# Patient Record
Sex: MALE | Race: BLACK OR AFRICAN AMERICAN
[De-identification: names, ages, dates, MRNs, and addresses within clinical notes are randomized per-mention and may not be internally consistent; named-entity substitution may affect disease eponyms.]

---

## 2019-07-29 ENCOUNTER — HOSPITAL ENCOUNTER (OUTPATIENT)
Dept: HOSPITAL 97 - ER | Age: 84
Setting detail: OBSERVATION
LOS: 3 days | Discharge: HOME | End: 2019-08-01
Attending: FAMILY MEDICINE | Admitting: FAMILY MEDICINE
Payer: COMMERCIAL

## 2019-07-29 VITALS — BODY MASS INDEX: 33.1 KG/M2

## 2019-07-29 DIAGNOSIS — E11.9: ICD-10-CM

## 2019-07-29 DIAGNOSIS — R41.82: ICD-10-CM

## 2019-07-29 DIAGNOSIS — R50.9: Primary | ICD-10-CM

## 2019-07-29 DIAGNOSIS — I65.21: ICD-10-CM

## 2019-07-29 DIAGNOSIS — R53.81: ICD-10-CM

## 2019-07-29 LAB
ALBUMIN SERPL BCP-MCNC: 3.7 G/DL (ref 3.4–5)
ALP SERPL-CCNC: 93 U/L (ref 45–117)
ALT SERPL W P-5'-P-CCNC: 16 U/L (ref 12–78)
AST SERPL W P-5'-P-CCNC: 15 U/L (ref 15–37)
BUN BLD-MCNC: 22 MG/DL (ref 7–18)
GLUCOSE SERPLBLD-MCNC: 96 MG/DL (ref 74–106)
HCT VFR BLD CALC: 38.6 % (ref 39.6–49)
INR BLD: 1.12
LIPASE SERPL-CCNC: 111 U/L (ref 73–393)
LYMPHOCYTES # SPEC AUTO: 3.4 K/UL (ref 0.7–4.9)
MAGNESIUM SERPL-MCNC: 2 MG/DL (ref 1.8–2.4)
NT-PROBNP SERPL-MCNC: 635 PG/ML (ref ?–450)
PMV BLD: 8.2 FL (ref 7.6–11.3)
POTASSIUM SERPL-SCNC: 4.1 MMOL/L (ref 3.5–5.1)
RBC # BLD: 4.27 M/UL (ref 4.33–5.43)
TROPONIN (EMERG DEPT USE ONLY): < 0.02 NG/ML (ref 0–0.04)

## 2019-07-29 PROCEDURE — 83880 ASSAY OF NATRIURETIC PEPTIDE: CPT

## 2019-07-29 PROCEDURE — 96365 THER/PROPH/DIAG IV INF INIT: CPT

## 2019-07-29 PROCEDURE — 87186 SC STD MICRODIL/AGAR DIL: CPT

## 2019-07-29 PROCEDURE — 97112 NEUROMUSCULAR REEDUCATION: CPT

## 2019-07-29 PROCEDURE — 80076 HEPATIC FUNCTION PANEL: CPT

## 2019-07-29 PROCEDURE — 97116 GAIT TRAINING THERAPY: CPT

## 2019-07-29 PROCEDURE — 87040 BLOOD CULTURE FOR BACTERIA: CPT

## 2019-07-29 PROCEDURE — 99285 EMERGENCY DEPT VISIT HI MDM: CPT

## 2019-07-29 PROCEDURE — 81015 MICROSCOPIC EXAM OF URINE: CPT

## 2019-07-29 PROCEDURE — 87077 CULTURE AEROBIC IDENTIFY: CPT

## 2019-07-29 PROCEDURE — 94760 N-INVAS EAR/PLS OXIMETRY 1: CPT

## 2019-07-29 PROCEDURE — 36415 COLL VENOUS BLD VENIPUNCTURE: CPT

## 2019-07-29 PROCEDURE — 87086 URINE CULTURE/COLONY COUNT: CPT

## 2019-07-29 PROCEDURE — 93880 EXTRACRANIAL BILAT STUDY: CPT

## 2019-07-29 PROCEDURE — 51702 INSERT TEMP BLADDER CATH: CPT

## 2019-07-29 PROCEDURE — 83605 ASSAY OF LACTIC ACID: CPT

## 2019-07-29 PROCEDURE — 82962 GLUCOSE BLOOD TEST: CPT

## 2019-07-29 PROCEDURE — 93005 ELECTROCARDIOGRAM TRACING: CPT

## 2019-07-29 PROCEDURE — 81003 URINALYSIS AUTO W/O SCOPE: CPT

## 2019-07-29 PROCEDURE — 84484 ASSAY OF TROPONIN QUANT: CPT

## 2019-07-29 PROCEDURE — 83735 ASSAY OF MAGNESIUM: CPT

## 2019-07-29 PROCEDURE — 97530 THERAPEUTIC ACTIVITIES: CPT

## 2019-07-29 PROCEDURE — 97161 PT EVAL LOW COMPLEX 20 MIN: CPT

## 2019-07-29 PROCEDURE — 85025 COMPLETE CBC W/AUTO DIFF WBC: CPT

## 2019-07-29 PROCEDURE — 94640 AIRWAY INHALATION TREATMENT: CPT

## 2019-07-29 PROCEDURE — 84145 PROCALCITONIN (PCT): CPT

## 2019-07-29 PROCEDURE — 80048 BASIC METABOLIC PNL TOTAL CA: CPT

## 2019-07-29 PROCEDURE — 83690 ASSAY OF LIPASE: CPT

## 2019-07-29 PROCEDURE — 87804 INFLUENZA ASSAY W/OPTIC: CPT

## 2019-07-29 PROCEDURE — 71045 X-RAY EXAM CHEST 1 VIEW: CPT

## 2019-07-29 PROCEDURE — 96361 HYDRATE IV INFUSION ADD-ON: CPT

## 2019-07-29 PROCEDURE — 87088 URINE BACTERIA CULTURE: CPT

## 2019-07-29 PROCEDURE — 85610 PROTHROMBIN TIME: CPT

## 2019-07-29 RX ADMIN — FAMOTIDINE SCH MG: 10 INJECTION, SOLUTION INTRAVENOUS at 22:22

## 2019-07-29 RX ADMIN — SODIUM CHLORIDE SCH: 0.9 INJECTION, SOLUTION INTRAVENOUS at 21:29

## 2019-07-29 RX ADMIN — Medication SCH ML: at 22:23

## 2019-07-29 NOTE — RAD REPORT
EXAM DESCRIPTION:  Anastasia Single View7/29/2019 5:55 pm

 

CLINICAL HISTORY:  Cough

 

COMPARISON:  July 2018

 

FINDINGS:   The lungs appear clear of acute infiltrate. The heart is mildly enlarged

 

IMPRESSION:   No acute abnormalities displayed

## 2019-07-29 NOTE — EDPHYS
Physician Documentation                                                                           

 [facility]                                                                 

Name: Rishi Joseph                                                                                

Age: 85 yrs                                                                                       

Sex: Male                                                                                         

: 1933                                                                                   

MRN: [id number]                                                                                   

Arrival Date: 2019                                                                          

Time: 16:55                                                                                       

Account#: [id number]                                                                            

Bed 5                                                                                             

Private MD: Abdullahi Rodrigues ED Physician Bridger Barber                                                                      

HPI:                                                                                              

                                                                                             

17:17 This 85 yrs old Black Male presents to ER via Unassigned with complaints of General     gabino 

      Weakness.                                                                                   

17:17 fever and cough. The patient or guardian reports cough. Onset: The symptoms/episode     gabino 

      began/occurred 1 day(s) ago. Severity of symptoms: At their worst the symptoms were         

      mild, in the emergency department the symptoms are unchanged. Modifying factors: The        

      symptoms are alleviated by cool environment, the symptoms are aggravated by exertion.       

      The patient reports fever, that was measured at 102 degrees Fahrenheit. Modifying           

      factors: there are no obvious modifying factors.                                            

17:19 Associated signs and symptoms: Pertinent positives: fever.                              gabino 

                                                                                                  

Historical:                                                                                       

- Allergies:                                                                                      

17:25 No Known Allergies;                                                                     iw  

- Home Meds:                                                                                      

17:19 amlodipine 10 mg tab 1 tab once daily [Active]; aspirin 81 mg Oral TbEC 1 tab once      iw  

      daily [Active]; chlorthalidone 25 mg Oral tab 1 tab once daily [Active]; Coreg 12.5 mg      

      Oral tab 1 tab 2 times per day [Active]; glipizide 10 mg Oral tab 1 tab once daily          

      [Active]; ranitidine HCl 150 mg Oral cap 1 cap once daily [Active]; tamsulosin 0.4 mg       

      Oral cp24 1 cap twice a day [Active];                                                       

- PMHx:                                                                                           

17:19 BPH; Diabetes - NIDDM; GERD; High Cholesterol; Hypertension;                            iw  

- PSHx:                                                                                           

17:19 Knee surgery;                                                                           iw  

                                                                                                  

- Immunization history:: Adult Immunizations up to date.                                          

- Family history:: not pertinent.                                                                 

- Ebola Screening: : Patient negative for fever greater than or equal to 101.5 degrees            

  Fahrenheit, and additional compatible Ebola Virus Disease symptoms Patient denies               

  exposure to infectious person Patient denies travel to an Ebola-affected area in the            

  21 days before illness onset No symptoms or risks identified at this time.                      

- Social history:: Smoking status: Patient/guardian denies using tobacco.                         

                                                                                                  

                                                                                                  

ROS:                                                                                              

17:17 Eyes: Negative for injury, pain, redness, and discharge, ENT: Negative for injury,      gabino 

      pain, and discharge, Neck: Negative for injury, pain, and swelling, Cardiovascular:         

      Negative for chest pain, palpitations, and edema, Abdomen/GI: Negative for abdominal        

      pain, nausea, vomiting, diarrhea, and constipation, Back: Negative for injury and pain,     

      : Negative for injury, bleeding, discharge, and swelling, MS/Extremity: Negative for      

      injury and deformity, Skin: Negative for injury, rash, and discoloration, Neuro:            

      Negative for headache, weakness, numbness, tingling, and seizure, Psych: Negative for       

      depression, anxiety, suicide ideation, homicidal ideation, and hallucinations,              

      Allergy/Immunology: Negative for hives, rash, and allergies, Endocrine: Negative for        

      neck swelling, polydipsia, polyuria, polyphagia, and marked weight changes,                 

      Hematologic/Lymphatic: Negative for swollen nodes, abnormal bleeding, and unusual           

      bruising.                                                                                   

17:17 Constitutional: Positive for body aches, chills, fatigue, fever, malaise.                   

17:17 Respiratory: Positive for cough, with no reported sputum.                                   

                                                                                                  

Exam:                                                                                             

17:17 Head/Face:  Normocephalic, atraumatic. Eyes:  Pupils equal round and reactive to light, gabino 

      extra-ocular motions intact.  Lids and lashes normal.  Conjunctiva and sclera are           

      non-icteric and not injected.  Cornea within normal limits.  Periorbital areas with no      

      swelling, redness, or edema. ENT:  Nares patent. No nasal discharge, no septal              

      abnormalities noted.  Tympanic membranes are normal and external auditory canals are        

      clear.  Oropharynx with no redness, swelling, or masses, exudates, or evidence of           

      obstruction, uvula midline.  Mucous membranes moist. Neck:  Trachea midline, no             

      thyromegaly or masses palpated, and no cervical lymphadenopathy.  Supple, full range of     

      motion without nuchal rigidity, or vertebral point tenderness.  No Meningismus.             

      Chest/axilla:  Normal chest wall appearance and motion.  Nontender with no deformity.       

      No lesions are appreciated. Cardiovascular:  Regular rate and rhythm with a normal S1       

      and S2.  No gallops, murmurs, or rubs.  Normal PMI, no JVD.  No pulse deficits.             

      Respiratory:  Lungs have equal breath sounds bilaterally, clear to auscultation and         

      percussion.  No rales, rhonchi or wheezes noted.  No increased work of breathing, no        

      retractions or nasal flaring. Abdomen/GI:  Soft, non-tender, with normal bowel sounds.      

      No distension or tympany.  No guarding or rebound.  No evidence of tenderness               

      throughout. Back:  No spinal tenderness.  No costovertebral tenderness.  Full range of      

      motion. Skin:  Warm, dry with normal turgor.  Normal color with no rashes, no lesions,      

      and no evidence of cellulitis. MS/ Extremity:  Pulses equal, no cyanosis.                   

      Neurovascular intact.  Full, normal range of motion. Neuro:  Awake and alert, GCS 15,       

      oriented to person, place, time, and situation.  Cranial nerves II-XII grossly intact.      

      Motor strength 5/5 in all extremities.  Sensory grossly intact.  Cerebellar exam            

      normal.  Normal gait. Psych:  Awake, alert, with orientation to person, place and time.     

       Behavior, mood, and affect are within normal limits.                                       

17:17 Constitutional: The patient appears febrile, lethargic.                                     

                                                                                                  

Vital Signs:                                                                                      

17:10  / 59 LA Sitting (auto/lg); Pulse 82; Resp 20; Temp 102.8(O); Pulse Ox 99% ;      jp3 

      Weight 106.59 kg (R); Height 5 ft. 10 in. (177.80 cm) (R); Pain 0/10;                       

18:18  / 62; Pulse 70; Resp 22; Pulse Ox 98% ;                                          bp  

18:51  / 87; Pulse 78; Resp 16; Pulse Ox 98% ;                                          bp  

19:55  / 52; Pulse 74; Resp 16; Temp 98.6(O); Pulse Ox 100% on R/A;                     tr5 

20:56  / 63; Pulse 64; Resp 19; Pulse Ox 99% on R/A;                                    tr5 

17:10 Body Mass Index 33.72 (106.59 kg, 177.80 cm)                                            jp3 

17:10 patient complains of weakness                                                           3 

                                                                                                  

MDM:                                                                                              

16:57 Patient medically screened.                                                             [facility] 

17:17 Data reviewed: vital signs, nurses notes, lab test result(s), EKG, radiologic studies,  [facility] 

      plain films.                                                                                

                                                                                                  

                                                                                             

17:16 Order name: Basic Metabolic Panel; Complete Time: 19:01                                 [facility] 

                                                                                             

17:16 Order name: CBC with Diff; Complete Time: 18:29                                         [facility] 

                                                                                             

17:16 Order name: LFT's; Complete Time: 19:01                                                 [facility] 

                                                                                             

17:16 Order name: Magnesium; Complete Time: 19:01                                             [facility] 

                                                                                             

17:16 Order name: NT PRO-BNP; Complete Time: 19:01                                            [facility] 

                                                                                             

17:16 Order name: PT-INR; Complete Time: 19:                                                [facility] 

                                                                                             

17:16 Order name: Troponin (emerg Dept Use Only); Complete Time: 19:01                        [facility] 

                                                                                             

17:16 Order name: Blood Culture Adult (2)                                                     [facility] 

                                                                                             

17:16 Order name: Urine Culture                                                               [facility] 

                                                                                             

17:16 Order name: Lipase; Complete Time: 19:01                                                [facility] 

                                                                                             

17:16 Order name: Influenza Screen (a \T\ B); Complete Time: 19:                              [facility]

                                                                                             

17:16 Order name: Lactate; Complete Time: 19:                                               [facility] 

                                                                                             

17:16 Order name: Procalcitonin                                                               [facility] 

                                                                                             

19:43 Order name: Urine Dipstick--Ancillary (enter results)                                   mw2 

                                                                                             

17:16 Order name: XRAY Chest (1 view)                                                         [facility] 

                                                                                             

17:16 Order name: EKG; Complete Time: 17:20                                                   [facility] 

                                                                                             

17:16 Order name: Cardiac monitoring; Complete Time: 18:14                                    [facility] 

                                                                                             

17:16 Order name: EKG - Nurse/Tech; Complete Time: 18:14                                      [facility] 

                                                                                             

17:16 Order name: IV Saline Lock; Complete Time: 18:14                                        [facility] 

                                                                                             

17:16 Order name: Labs collected and sent; Complete Time: 18:14                               [facility] 

                                                                                             

17:16 Order name: O2 Per Protocol; Complete Time: 18:14                                       [facility] 

                                                                                             

17:16 Order name: O2 Sat Monitoring; Complete Time: 18:14                                     [facility] 

                                                                                             

18:12 Order name: RAD; Complete Time: 18:29                                                   [facility]

                                                                                             

20:14 Order name: Urine Dipstick-Ancillary                                                    [facility]

                                                                                             

17:16 Order name: Urine Dipstick-Ancillary (obtain specimen); Complete Time: 19:56            [facility] 

                                                                                                  

Administered Medications:                                                                         

18:10 Drug: NS 0.9% 1000 ml Route: IV; Rate: 1 bolus; Site: right forearm;                    bp  

20:29 Follow up: IV Status: Completed infusion; IV Intake: 1000ml                             tr5 

18:10 Drug: NS 0.9% 1000 ml Route: IV; Rate: 125 ml/hr; Site: right forearm;                  bp  

19:52 Follow up: IV Status: Infusion continued upon admission                                 lp1 

20:29 Follow up: IV Status: Infusion continued upon admission                                 tr5 

18:49 Drug: Tylenol 650 mg Route: PO;                                                         bp  

19:52 Follow up: Response: Temperature is decreased                                           lp1 

18:50 Drug: NS 0.9% 1000 ml Route: IV; Rate: 1 bolus; Site: right forearm;                    bp  

20:45 Follow up: IV Status: Completed infusion; IV Intake: 800ml                              lp1 

19:45 Drug: Rocephin 2 grams Route: IV; Rate: per protocol; Site: right hand;                 lp1 

20:30 Follow up: Response: No adverse reaction; IV Status: Completed infusion                 tr5 

19:53 Drug: Zithromax 500 mg Route: IVPB; Infused Over: 1 hrs; Site: right hand;              lp1 

20:30 Follow up: Response: No adverse reaction; IV Status: Completed infusion; IV Intake:     tr5 

      250ml                                                                                       

                                                                                                  

                                                                                                  

Disposition:                                                                                      

19 17:30 Hospitalization ordered by Abdullahi Rodrigues for Inpatient Admission. Preliminary    

  diagnosis are Weakness, Fever, unspecified, Pneumonia due to other specified                    

  bacteria, Cough, Elevated white blood cell count, Unspecified kidney failure,                   

  Urinary tract infection, site not specified.                                                    

- Bed requested for Telemetry/MedSurg (Inpatient).                                                

- Status is Inpatient Admission.                                                              tr5 

- Condition is Stable.                                                                            

- Problem is new.                                                                                 

- Symptoms have improved.                                                                         

UTI on Admission? Yes                                                                             

                                                                                                  

                                                                                                  

                                                                                                  

Signatures:                                                                                       

Dispatcher [employer]                           Columba Devine RN RN dw Anderson, Corey, MD MD cha Williams, Irene, RN RN                                                      

Deandra De La Cruz RN                         RN   [facility]                                                  

Zane Milan RN RN bp Rodriguez, Tommie, RN                   RN   tr5                                                  

                                                                                                  

Corrections: (The following items were deleted from the chart)                                    

17:48 17:30 Hospitalization Ordered by Abdullahi Rodrigues MD for Inpatient Admission. Preliminary dw  

      diagnosis is Weakness; Fever, unspecified; Pneumonia due to other specified bacteria;       

      Cough. Bed requested for Telemetry/MedSurg (Inpatient). Status is Inpatient Admission.      

      Condition is Stable. Problem is new. Symptoms have improved. UTI on Admission? No. gabino      

17:52 17:48 2019 17:30 Hospitalization Ordered by Abdullahi Rodrigues MD for Inpatient         

      Admission. Preliminary diagnosis is Weakness; Fever, unspecified; Pneumonia due to          

      other specified bacteria; Cough. Bed requested for Telemetry/MedSurg (Inpatient).           

      Status is Inpatient Admission. Condition is Stable. Problem is new. Symptoms have           

      improved. UTI on Admission? No.                                                           

18:30 17:52 2019 17:30 Hospitalization Ordered by Abdullahi Rodrigues MD for Inpatient       gabino 

      Admission. Preliminary diagnosis is Weakness; Fever, unspecified; Pneumonia due to          

      other specified bacteria; Cough. Bed requested for Telemetry/MedSurg (Inpatient).           

      Status is Inpatient Admission. Condition is Stable. Problem is new. Symptoms have           

      improved. UTI on Admission? No.                                                           

18:59 18:30 2019 17:30 Hospitalization Ordered by Abdullahi Rodrigues MD for Inpatient       gabino 

      Admission. Preliminary diagnosis is Weakness; Fever, unspecified; Pneumonia due to          

      other specified bacteria; Cough; Elevated white blood cell count. Bed requested for         

      Telemetry/MedSurg (Inpatient). Status is Inpatient Admission. Condition is Stable.          

      Problem is new. Symptoms have improved. UTI on Admission? No. [facility]                           

19:42 18:59 2019 17:30 Hospitalization Ordered by Abdullahi Rodrigues MD for Inpatient       gabino 

      Admission. Preliminary diagnosis is Weakness; Fever, unspecified; Pneumonia due to          

      other specified bacteria; Cough; Elevated white blood cell count; Unspecified kidney        

      failure. Bed requested for Telemetry/MedSurg (Inpatient). Status is Inpatient               

      Admission. Condition is Stable. Problem is new. Symptoms have improved. UTI on              

      Admission? No. [facility]                                                                          

21:05 19:42 2019 17:30 Hospitalization Ordered by Abdullahi Rodrigues MD for Inpatient       tr5 

      Admission. Preliminary diagnosis is Weakness; Fever, unspecified; Pneumonia due to          

      other specified bacteria; Cough; Elevated white blood cell count; Unspecified kidney        

      failure; Urinary tract infection, site not specified. Bed requested for                     

      Telemetry/MedSurg (Inpatient). Status is Inpatient Admission. Condition is Stable.          

      Problem is new. Symptoms have improved. UTI on Admission? Yes. gabino                          

                                                                                                  

**************************************************************************************************

## 2019-07-29 NOTE — ER
Nurse's Notes                                                                                     

 [facility]                                                                 

Name: Rishi Joseph                                                                                

Age: 85 yrs                                                                                       

Sex: Male                                                                                         

: 1933                                                                                   

MRN: [id number]                                                                                   

Arrival Date: 2019                                                                          

Time: 16:55                                                                                       

Account#: [id number]                                                                            

Bed 5                                                                                             

Private MD: Abdullahi Rodrigues                                                                       

Diagnosis: Weakness;Fever, unspecified;Pneumonia due to other specified bacteria;Cough;Elevated   

  white blood cell count;Unspecified kidney failure;Urinary tract infection, site not             

  specified                                                                                       

                                                                                                  

Presentation:                                                                                     

                                                                                             

17:17 Presenting complaint: Wife states: cough, congestion, fever since yesterday. Transition iw  

      of care: patient was not received from another setting of care. Onset of symptoms was       

      2019. Risk Assessment: Do you want to hurt yourself or someone else? Patient       

      reports no desire to harm self or others. Initial Sepsis Screen: Does the patient meet      

      any 2 criteria? Temp <36.0*C (96.8*F)) or > 38.3*C (100.9*F). Initial Sepsis Screen:        

      Does the patient have a suspected source of infection? Yes: Productive cough/pneumonia.     

      Care prior to arrival: None.                                                                

17:17 Method Of Arrival: Wheelchair                                                           iw  

17:17 Acuity: NEREYDA 3                                                                           iw  

                                                                                                  

Triage Assessment:                                                                                

17:20 General: Appears in no apparent distress. comfortable, Behavior is cooperative,         bp  

      appropriate for age, anxious. Pain: Denies pain. EENT: No deficits noted. Neuro: No         

      deficits noted. Cardiovascular: No deficits noted. Respiratory: No deficits noted. GI:      

      No signs and/or symptoms were reported involving the gastrointestinal system. : No        

      signs and/or symptoms were reported regarding the genitourinary system. Derm: No            

      deficits noted. Musculoskeletal: No deficits noted.                                         

                                                                                                  

Historical:                                                                                       

- Allergies:                                                                                      

17:25 No Known Allergies;                                                                     iw  

- Home Meds:                                                                                      

17:19 amlodipine 10 mg tab 1 tab once daily [Active]; aspirin 81 mg Oral TbEC 1 tab once      iw  

      daily [Active]; chlorthalidone 25 mg Oral tab 1 tab once daily [Active]; Coreg 12.5 mg      

      Oral tab 1 tab 2 times per day [Active]; glipizide 10 mg Oral tab 1 tab once daily          

      [Active]; ranitidine HCl 150 mg Oral cap 1 cap once daily [Active]; tamsulosin 0.4 mg       

      Oral cp24 1 cap twice a day [Active];                                                       

- PMHx:                                                                                           

17:19 BPH; Diabetes - NIDDM; GERD; High Cholesterol; Hypertension;                            iw  

- PSHx:                                                                                           

17:19 Knee surgery;                                                                           iw  

                                                                                                  

- Immunization history:: Adult Immunizations up to date.                                          

- Family history:: not pertinent.                                                                 

- Ebola Screening: : Patient negative for fever greater than or equal to 101.5 degrees            

  Fahrenheit, and additional compatible Ebola Virus Disease symptoms Patient denies               

  exposure to infectious person Patient denies travel to an Ebola-affected area in the            

  21 days before illness onset No symptoms or risks identified at this time.                      

- Social history:: Smoking status: Patient/guardian denies using tobacco.                         

                                                                                                  

                                                                                                  

Screenin:16 Abuse screen: Denies threats or abuse. Denies injuries from another. Nutritional        bp  

      screening: No deficits noted. Tuberculosis screening: No symptoms or risk factors           

      identified. Fall Risk None identified.                                                      

                                                                                                  

Assessment:                                                                                       

17:20 General: SEE TRIAGE NOTE.                                                               bp  

18:15 Reassessment: ADMIT IN PROCESS, VS STABLE ON MONITOR.                                   bp  

18:50 Reassessment: PER MD, ADMIT ON HOLD FOR LAB RESULTS.                                    bp  

19:19 Reassessment: Patient and/or family updated on plan of care and expected duration. Pain tr5 

      level reassessed. Patient is alert, oriented x 3, equal unlabored respirations, skin        

      warm/dry/pink.                                                                              

20:27 Reassessment: Patient and/or family updated on plan of care and expected duration. Pain tr5 

      level reassessed. Patient is alert, oriented x 3, equal unlabored respirations, skin        

      warm/dry/pink. Patient states feeling better.                                               

                                                                                                  

Vital Signs:                                                                                      

17:10  / 59 LA Sitting (auto/lg); Pulse 82; Resp 20; Temp 102.8(O); Pulse Ox 99% ;      jp3 

      Weight 106.59 kg (R); Height 5 ft. 10 in. (177.80 cm) (R); Pain 0/10;                       

18:18  / 62; Pulse 70; Resp 22; Pulse Ox 98% ;                                          bp  

18:51  / 87; Pulse 78; Resp 16; Pulse Ox 98% ;                                          bp  

19:55  / 52; Pulse 74; Resp 16; Temp 98.6(O); Pulse Ox 100% on R/A;                     tr5 

20:56  / 63; Pulse 64; Resp 19; Pulse Ox 99% on R/A;                                    tr5 

17:10 Body Mass Index 33.72 (106.59 kg, 177.80 cm)                                            jp3 

17:10 patient complains of weakness                                                           jp3 

                                                                                                  

ED Course:                                                                                        

16:55 Patient arrived in ED.                                                                  dl4 

16:55 Abdullahi Rodrigues MD is Private Physician.                                               dl4 

16:57 Bridger Barber MD is Attending Physician.                                             gabino 

17:07 Siva Nagel, RN is Primary Nurse.                                                    hj  

17:11 Patient has correct armband on for positive identification. Placed in gown. Bed in low  jp3 

      position. Call light in reach. Pillow given. Verbal reassurance given. Cardiac monitor      

      on. Pulse ox on. NIBP on.                                                                   

17:11 Patient maintains SpO2 saturation greater than 95% on room air.                         jp3 

17:18 Triage completed.                                                                       iw  

17:20 Arm band placed on.                                                                     bp  

17:29 Abdullahi Rodrigues MD is Hospitalizing Provider.                                          gabino 

17:56 EKG done, by EKG tech. reviewed by Bridger Barber MD.                                   sm3 

18:10 Inserted saline lock: 20 gauge in right forearm, using aseptic technique. Blood         bp  

      collected.                                                                                  

18:16 No provider procedures requiring assistance completed. Patient admitted, IV remains in  bp  

      place.                                                                                      

18:30 Zane Milan, RN is Primary Nurse.                                                    bp  

19:19 Awaiting lab results.                                                                   tr5 

19:19 Report received from Zane STEPHENS. Cardiac monitor on. Pulse ox on. NIBP on.                tr5 

19:40 Straight cath inserted, using sterile technique, 16 Fr. Specimen obtained.              lp1 

20:27 Report given to Sal STEPHENS.                                                               tr5 

                                                                                                  

Administered Medications:                                                                         

18:10 Drug: NS 0.9% 1000 ml Route: IV; Rate: 1 bolus; Site: right forearm;                    bp  

20:29 Follow up: IV Status: Completed infusion; IV Intake: 1000ml                             tr5 

18:10 Drug: NS 0.9% 1000 ml Route: IV; Rate: 125 ml/hr; Site: right forearm;                  bp  

19:52 Follow up: IV Status: Infusion continued upon admission                                 lp1 

20:29 Follow up: IV Status: Infusion continued upon admission                                 tr5 

18:49 Drug: Tylenol 650 mg Route: PO;                                                         bp  

19:52 Follow up: Response: Temperature is decreased                                           lp1 

18:50 Drug: NS 0.9% 1000 ml Route: IV; Rate: 1 bolus; Site: right forearm;                    bp  

20:45 Follow up: IV Status: Completed infusion; IV Intake: 800ml                              lp1 

19:45 Drug: Rocephin 2 grams Route: IV; Rate: per protocol; Site: right hand;                 lp1 

20:30 Follow up: Response: No adverse reaction; IV Status: Completed infusion                 tr5 

19:53 Drug: Zithromax 500 mg Route: IVPB; Infused Over: 1 hrs; Site: right hand;              lp1 

20:30 Follow up: Response: No adverse reaction; IV Status: Completed infusion; IV Intake:     tr5 

      250ml                                                                                       

                                                                                                  

                                                                                                  

Intake:                                                                                           

20:29 IV: 1000ml; Total: 1000ml.                                                              tr5 

20:30 IV: 250ml; Total: 1250ml.                                                               tr5 

20:45 IV: 800ml; Total: 2050ml.                                                               lp1 

                                                                                                  

Outcome:                                                                                          

17:30 Decision to Hospitalize by Provider.                                                    gabino 

19:53 Condition: stable                                                                       lp1 

19:53 Instructed on the need for admit.                                                           

20:27 Admitted to Med/surg accompanied by tech, via stretcher, with chart, Report called to   trYung Huang RN                                                                                    

21:05 Patient left the ED.                                                                    tr5 

                                                                                                  

Signatures:                                                                                       

Bridger Barber MD MD cha Williams, Irene, RN                     KAT   iw                                                   

Deandra De La Cruz, KAT                         RN   lp1                                                  

Siva Nagel RN                      Zane Galloway RN RN bp Montes, Shakira                              3                                                  

Octaviano Colvin jp3                                                  

Kimo Montilla                                  dl4                                                  

Иван Burnett, KAT                   RN   tr5                                                  

                                                                                                  

Corrections: (The following items were deleted from the chart)                                    

19:56 19:55  / 52; Pulse 74bpm; Resp 16bpm; Pulse Ox 100% RA; tr5                       tr5 

                                                                                                  

**************************************************************************************************

## 2019-07-30 LAB
BUN BLD-MCNC: 22 MG/DL (ref 7–18)
GLUCOSE SERPLBLD-MCNC: 117 MG/DL (ref 74–106)
HCT VFR BLD CALC: 34.5 % (ref 39.6–49)
LYMPHOCYTES # SPEC AUTO: 3.4 K/UL (ref 0.7–4.9)
NT-PROBNP SERPL-MCNC: 760 PG/ML (ref ?–450)
PMV BLD: 8.5 FL (ref 7.6–11.3)
POTASSIUM SERPL-SCNC: 3.9 MMOL/L (ref 3.5–5.1)
RBC # BLD: 3.85 M/UL (ref 4.33–5.43)

## 2019-07-30 RX ADMIN — SODIUM CHLORIDE SCH MLS: 0.9 INJECTION, SOLUTION INTRAVENOUS at 05:40

## 2019-07-30 RX ADMIN — Medication SCH ML: at 21:07

## 2019-07-30 RX ADMIN — SODIUM CHLORIDE SCH MLS: 0.9 INJECTION, SOLUTION INTRAVENOUS at 21:07

## 2019-07-30 RX ADMIN — ACETAMINOPHEN PRN MG: 500 TABLET, FILM COATED ORAL at 17:11

## 2019-07-30 RX ADMIN — CEFTRIAXONE SCH MLS: 1 INJECTION, SOLUTION INTRAVENOUS at 21:00

## 2019-07-30 RX ADMIN — ACETAMINOPHEN PRN MG: 500 TABLET, FILM COATED ORAL at 05:47

## 2019-07-30 RX ADMIN — FAMOTIDINE SCH MG: 10 INJECTION, SOLUTION INTRAVENOUS at 10:17

## 2019-07-30 RX ADMIN — CEFTRIAXONE SCH MLS: 1 INJECTION, SOLUTION INTRAVENOUS at 09:00

## 2019-07-30 RX ADMIN — TAMSULOSIN HYDROCHLORIDE SCH MG: 0.4 CAPSULE ORAL at 21:06

## 2019-07-30 RX ADMIN — CARVEDILOL SCH MG: 12.5 TABLET, FILM COATED ORAL at 21:05

## 2019-07-30 RX ADMIN — SODIUM CHLORIDE SCH MLS: 0.9 INJECTION, SOLUTION INTRAVENOUS at 13:11

## 2019-07-30 RX ADMIN — CIPROFLOXACIN SCH MLS: 2 INJECTION, SOLUTION INTRAVENOUS at 21:06

## 2019-07-30 RX ADMIN — Medication SCH ML: at 10:17

## 2019-07-30 NOTE — RAD REPORT
EXAM DESCRIPTION:  RAD - Chest Single View - 7/30/2019 6:22 am

 

CLINICAL HISTORY:  Chest Pain

Chest pain.

 

COMPARISON:  Chest Single View dated 7/29/2019; Chest Pa And Lat (2 Views) dated 7/27/2018; Chest Pa 
And Lat (2 Views) dated 10/13/2017; Chest Single View dated 9/18/2017

 

FINDINGS:  Portable technique limits examination quality.

 

The lungs are grossly clear. The heart is moderately enlarged in size. No displaced fractures.

## 2019-07-30 NOTE — EKG
Test Date:    2019-07-29               Test Time:    17:49:27

Technician:   JOSE E                                     

                                                     

MEASUREMENT RESULTS:                                       

Intervals:                                           

Rate:         70                                     

CT:           186                                    

QRSD:         94                                     

QT:           388                                    

QTc:          419                                    

Axis:                                                

P:            39                                     

CT:           186                                    

QRS:          -54                                    

T:            42                                     

                                                     

INTERPRETIVE STATEMENTS:                                       

                                                     

Normal sinus rhythm

Possible Left atrial enlargement

Left anterior fascicular block

Inferior infarct, age undetermined

Anterior infarct, age undetermined

Abnormal ECG

Compared to ECG 09/18/2017 00:33:36

Left anterior fascicular block now present

Myocardial infarct finding now present



Electronically Signed On 07-30-19 13:34:23 CDT by Bassem Astorga

## 2019-07-30 NOTE — EKG
Test Date:    2019-07-30               Test Time:    07:59:13

Technician:   JOSE E                                     

                                                     

MEASUREMENT RESULTS:                                       

Intervals:                                           

Rate:         74                                     

IN:           180                                    

QRSD:         92                                     

QT:           408                                    

QTc:          452                                    

Axis:                                                

P:            35                                     

IN:           180                                    

QRS:          -43                                    

T:            40                                     

                                                     

INTERPRETIVE STATEMENTS:                                       

                                                     

Normal sinus rhythm

Possible Left atrial enlargement

Left axis deviation

Anterior infarct, age undetermined

Abnormal ECG

Compared to ECG 07/29/2019 17:49:27

Left-axis deviation now present

Left anterior fascicular block no longer present

Myocardial infarct finding still present



Electronically Signed On 07-30-19 13:34:02 CDT by Bassem Astorga

## 2019-07-31 VITALS — OXYGEN SATURATION: 98 %

## 2019-07-31 LAB
BUN BLD-MCNC: 24 MG/DL (ref 7–18)
GLUCOSE SERPLBLD-MCNC: 125 MG/DL (ref 74–106)
HCT VFR BLD CALC: 33.5 % (ref 39.6–49)
LYMPHOCYTES # SPEC AUTO: 4.2 K/UL (ref 0.7–4.9)
PMV BLD: 8.5 FL (ref 7.6–11.3)
POTASSIUM SERPL-SCNC: 4 MMOL/L (ref 3.5–5.1)
RBC # BLD: 3.69 M/UL (ref 4.33–5.43)
UA COMPLETE W REFLEX CULTURE PNL UR: (no result)
UA DIPSTICK W REFLEX MICRO PNL UR: (no result)

## 2019-07-31 RX ADMIN — CARVEDILOL SCH MG: 12.5 TABLET, FILM COATED ORAL at 08:58

## 2019-07-31 RX ADMIN — Medication SCH: at 21:00

## 2019-07-31 RX ADMIN — CEFTRIAXONE SCH MLS: 1 INJECTION, SOLUTION INTRAVENOUS at 08:59

## 2019-07-31 RX ADMIN — TAMSULOSIN HYDROCHLORIDE SCH MG: 0.4 CAPSULE ORAL at 08:59

## 2019-07-31 RX ADMIN — Medication SCH: at 08:59

## 2019-07-31 RX ADMIN — TAMSULOSIN HYDROCHLORIDE SCH MG: 0.4 CAPSULE ORAL at 21:15

## 2019-07-31 RX ADMIN — CIPROFLOXACIN SCH MLS: 2 INJECTION, SOLUTION INTRAVENOUS at 08:59

## 2019-07-31 RX ADMIN — CARVEDILOL SCH MG: 12.5 TABLET, FILM COATED ORAL at 21:15

## 2019-07-31 RX ADMIN — CIPROFLOXACIN SCH MLS: 2 INJECTION, SOLUTION INTRAVENOUS at 21:15

## 2019-07-31 RX ADMIN — AMLODIPINE BESYLATE SCH MG: 10 TABLET ORAL at 08:59

## 2019-07-31 RX ADMIN — FAMOTIDINE SCH MG: 10 INJECTION, SOLUTION INTRAVENOUS at 08:58

## 2019-07-31 NOTE — PN
Date of Progress Note:  07/30/2019



The patient states he feels somewhat better today.  His temperature has dropped down, although he sti
ll running a low-grade fever.  His urinalysis was 4+ E coli.  Chest x-ray x2 showed no evidence of pn
eumonia and patient states he has a minimal amount of cough.  Antibiotics will be changed from Zithro
max to Cipro and continue with Rocephin.  He was also started on physical therapy.  Had a vascular wo
rkup, which was scheduled for today will be rescheduled possibly prior to discharge depending on his 
physical status.





HR/MODL

DD:  07/30/2019 14:16:26Voice ID:  [id number]

DT:  07/31/2019 00:11:08Report ID:  [id number]

## 2019-07-31 NOTE — HP
Date of Admission:  07/29/2019



Entrance Complaint:  Cough, chills, fever.



History Of Present Illness:  The patient presented to the emergency room with a rather sudden onset o
f what was described as shaking, unsteadiness following going to the bathroom.  The patient had an im
pression that it was congestion and cough and the possibility of this being the source of the fever a
nd some general malaise.  The patient has had some altered mental status over the past few months and
 was recently seen in the office where a vascular workup is in process.



Past Medical History:  As mentioned above significant altered mental status according to family membe
rs over the past few months, also has a history of hypertension and has been in relatively good contr
ol on medication and has had some renal insufficiency.  He has been seen by urologist and nephrologis
t for a number of years.  Also has an IDDM, which has been in good control on diet and medication.



Family History:  Noncontributory.



Social History:  Nonsmoker, nondrinker.



Physical Examination:

General:  He is somewhat disorientated elderly male. 

Vital Signs:  Stable vital signs. 

Head and Neck:  Normocephalic.  Pupils equal, reactive to light and accommodation.  Fundi negative.  
Trachea midline.  Thyroid not palpable. 

ENT:  Negative. 

Chest:  Occasional high-pitched rhonchi and rales at both bases.  Adequate air entry and movement sharona
aterally. 

Cardiovascular:  PMI midclavicular line.  Heart sounds normal.  Peripheral pulses present and equal b
ilaterally. 

Abdomen:  No organomegaly.  Bowel sounds present. 

Extremities:  Moderately dehydrated.  Good tone and movement bilaterally.  Reflexes physiologic. 

Rectal:  Deferred.



Impression:  Fever of unknown origin, possible pneumonitis, possible urinary tract infection.



Plan:  Patient will be admitted, placed on IV antibiotics.  Depending on results of culture, further 
treatment will be given.





HR/MODL

DD:  07/30/2019 14:16:26Voice ID:  [id number]

## 2019-07-31 NOTE — RAD REPORT
EXAM DESCRIPTION:  USCarotid Artery Bilateral7/31/2019 2:47 pm

 

CLINICAL HISTORY:  Syncope

 

COMPARISON:   None

 

FINDINGS:  The velocity of the right internal carotid artery equals 353 cm/sec. The right ICA/CCA rat
io 3.3

 

The velocity of the left internal carotid artery equals 114 cm/sec. The left ICA/CCA ratio 0.7

 

Marked plaque is present within the right internal carotid artery.

 

The vertebral arteries demonstrate antegrade flow

 

IMPRESSION:  Marked plaque within the right internal carotid artery resulting in a high-grade stenosi
s

 

NASCET criteria used.

 

Mild  0-49% stenosis

Moderate 50-69% stenosis

Severe 70-99% stenosis

## 2019-08-01 VITALS — SYSTOLIC BLOOD PRESSURE: 138 MMHG | DIASTOLIC BLOOD PRESSURE: 79 MMHG | TEMPERATURE: 97.3 F

## 2019-08-01 LAB
HCT VFR BLD CALC: 35.4 % (ref 39.6–49)
LYMPHOCYTES # SPEC AUTO: 3.9 K/UL (ref 0.7–4.9)
PMV BLD: 8.7 FL (ref 7.6–11.3)
RBC # BLD: 3.94 M/UL (ref 4.33–5.43)

## 2019-08-01 RX ADMIN — AMLODIPINE BESYLATE SCH MG: 10 TABLET ORAL at 08:04

## 2019-08-01 RX ADMIN — CIPROFLOXACIN SCH MLS: 2 INJECTION, SOLUTION INTRAVENOUS at 08:04

## 2019-08-01 RX ADMIN — FAMOTIDINE SCH MG: 10 INJECTION, SOLUTION INTRAVENOUS at 08:04

## 2019-08-01 RX ADMIN — TAMSULOSIN HYDROCHLORIDE SCH MG: 0.4 CAPSULE ORAL at 08:05

## 2019-08-01 RX ADMIN — Medication SCH: at 08:05

## 2019-08-01 RX ADMIN — CARVEDILOL SCH MG: 12.5 TABLET, FILM COATED ORAL at 08:05

## 2019-08-01 RX ADMIN — SODIUM CHLORIDE SCH MLS: 0.9 INJECTION, SOLUTION INTRAVENOUS at 03:05

## 2019-08-01 NOTE — PN
Patient states he feels a little bit better.  For the first time, his white count has dropped and his
 intake is adequate.  I feel he could be discharged safely on Cipro 500 mg twice a day for 10 days, t
o follow up in the office in regard to his UTI.  During his hospital stay, the carotid Dopplers were 
done and revealed a significant lesion on the right.  Discussion of followup with Cardiology resulted
 in being referred to the cardiologist office for carotid angios, and depending on the results, eithe
r a surgical procedure in [place], endarterectomy, and/or conservative care.  PT was also involved an
d the outpatient physical therapy is obviously a possibility for him.  This will be discussed with th
e family when he is seen in the office next week.  He is encouraged for fluid intake, continue on his
 usual medications with the addition of Cipro.



Final Diagnoses:  Urosepsis, possibly lower urinary tract; altered mental status; carotid stenosis; n
on-insulin-dependent diabetes mellitus, good control.





HR/MODL

DD:  08/01/2019 15:12:33Voice ID:  [id number]

DT:  08/01/2019 19:10:46Report ID:  [id number]

## 2019-08-14 LAB
BUN BLD-MCNC: 29 MG/DL (ref 7–18)
GLUCOSE SERPLBLD-MCNC: 117 MG/DL (ref 74–106)
INR BLD: 1.02
POTASSIUM SERPL-SCNC: 4.1 MMOL/L (ref 3.5–5.1)

## 2019-08-15 ENCOUNTER — HOSPITAL ENCOUNTER (OUTPATIENT)
Dept: HOSPITAL 97 - CCL | Age: 84
Discharge: HOME | End: 2019-08-15
Attending: INTERNAL MEDICINE
Payer: COMMERCIAL

## 2019-08-15 VITALS — TEMPERATURE: 97.8 F | OXYGEN SATURATION: 97 % | SYSTOLIC BLOOD PRESSURE: 139 MMHG | DIASTOLIC BLOOD PRESSURE: 60 MMHG

## 2019-08-15 VITALS — BODY MASS INDEX: 33.1 KG/M2

## 2019-08-15 DIAGNOSIS — K21.9: ICD-10-CM

## 2019-08-15 DIAGNOSIS — N40.0: ICD-10-CM

## 2019-08-15 DIAGNOSIS — E78.5: ICD-10-CM

## 2019-08-15 DIAGNOSIS — I10: ICD-10-CM

## 2019-08-15 DIAGNOSIS — I25.10: Primary | ICD-10-CM

## 2019-08-15 DIAGNOSIS — R94.31: ICD-10-CM

## 2019-08-15 DIAGNOSIS — I65.21: ICD-10-CM

## 2019-08-15 DIAGNOSIS — E11.9: ICD-10-CM

## 2019-08-15 PROCEDURE — 36415 COLL VENOUS BLD VENIPUNCTURE: CPT

## 2019-08-15 PROCEDURE — 82962 GLUCOSE BLOOD TEST: CPT

## 2019-08-15 PROCEDURE — 85730 THROMBOPLASTIN TIME PARTIAL: CPT

## 2019-08-15 PROCEDURE — 93454 CORONARY ARTERY ANGIO S&I: CPT

## 2019-08-15 PROCEDURE — 80048 BASIC METABOLIC PNL TOTAL CA: CPT

## 2019-08-15 PROCEDURE — 85610 PROTHROMBIN TIME: CPT

## 2019-08-15 PROCEDURE — 36222 PLACE CATH CAROTID/INOM ART: CPT

## 2019-08-16 NOTE — OP
Surgeon:  Bassem Astorga MD



Assistant:  Caitlyn Gautam.



Procedure:  The patient admitted as an outpatient on 08/15/2019, for left heart catheterization with 
selective coronary arteriogram and selective bilateral carotid angiogram.



Indication:  Chest pain, positive stress test and abnormal carotid Doppler.



Description Of Procedure:  The patient was prepped and draped in the routine sterile fashion, given 2
 mg of Versed for IV sedation.  A 6-Rwandan sheath was introduced in the right common femoral artery. 
 StarClose was used to close the case.  Angiography there was normal.  A 6-[place]an left Nabil cathet
er was introduced first.  Coronary arteriography of the left main shows dual ostium for the circumfle
x and LAD with diffuse plaquing throughout and no significant focal stenosis.  A Nabil 6-[place] JR4
 was used for the right coronary that was basically a small vessel again with tortuosity and plaquing
, but no focal stenosis.  Nabil catheter was then used to select the right common carotid artery an
d the left common carotid artery.  Angiography there showed about an 80%-90% stenosis in the ostium o
f the right internal carotid artery.  The left internal carotid artery had about 30% plaque.  There w
ere no complications.  Blood loss was 5 cc.  Total conscious sedation was 40 minutes.



Final Diagnoses:  Severe cerebrovascular disease, mild coronary artery disease.



Plan:  For referral to [place] for a right carotid end arterectomy.  This CD will be given to the rogelio zepeda.  Case was discussed with the family and the patient.





NB/SE

DD:  08/15/2019 16:31:54Voice ID:  [id number]

DT:  08/16/2019 03:28:02Report ID:  [id number]

## 2021-01-20 ENCOUNTER — HOSPITAL ENCOUNTER (INPATIENT)
Dept: HOSPITAL 97 - ER | Age: 86
LOS: 37 days | Discharge: HOSPICE | DRG: 177 | End: 2021-02-26
Attending: HOSPITALIST | Admitting: HOSPITALIST
Payer: COMMERCIAL

## 2021-01-20 VITALS — BODY MASS INDEX: 30.7 KG/M2

## 2021-01-20 DIAGNOSIS — D72.829: ICD-10-CM

## 2021-01-20 DIAGNOSIS — E11.65: ICD-10-CM

## 2021-01-20 DIAGNOSIS — L89.152: ICD-10-CM

## 2021-01-20 DIAGNOSIS — Z79.899: ICD-10-CM

## 2021-01-20 DIAGNOSIS — U07.1: Primary | ICD-10-CM

## 2021-01-20 DIAGNOSIS — M79.604: ICD-10-CM

## 2021-01-20 DIAGNOSIS — E83.51: ICD-10-CM

## 2021-01-20 DIAGNOSIS — N18.32: ICD-10-CM

## 2021-01-20 DIAGNOSIS — I13.0: ICD-10-CM

## 2021-01-20 DIAGNOSIS — Z79.4: ICD-10-CM

## 2021-01-20 DIAGNOSIS — E78.5: ICD-10-CM

## 2021-01-20 DIAGNOSIS — Z66: ICD-10-CM

## 2021-01-20 DIAGNOSIS — N40.1: ICD-10-CM

## 2021-01-20 DIAGNOSIS — N39.0: ICD-10-CM

## 2021-01-20 DIAGNOSIS — E87.0: ICD-10-CM

## 2021-01-20 DIAGNOSIS — D63.8: ICD-10-CM

## 2021-01-20 DIAGNOSIS — N17.9: ICD-10-CM

## 2021-01-20 DIAGNOSIS — J96.01: ICD-10-CM

## 2021-01-20 DIAGNOSIS — D69.6: ICD-10-CM

## 2021-01-20 DIAGNOSIS — J12.82: ICD-10-CM

## 2021-01-20 DIAGNOSIS — G93.41: ICD-10-CM

## 2021-01-20 DIAGNOSIS — E87.1: ICD-10-CM

## 2021-01-20 DIAGNOSIS — I50.32: ICD-10-CM

## 2021-01-20 DIAGNOSIS — D62: ICD-10-CM

## 2021-01-20 DIAGNOSIS — K64.9: ICD-10-CM

## 2021-01-20 DIAGNOSIS — E43: ICD-10-CM

## 2021-01-20 DIAGNOSIS — Z79.02: ICD-10-CM

## 2021-01-20 DIAGNOSIS — K21.9: ICD-10-CM

## 2021-01-20 DIAGNOSIS — Z90.49: ICD-10-CM

## 2021-01-20 DIAGNOSIS — E11.22: ICD-10-CM

## 2021-01-20 DIAGNOSIS — F02.80: ICD-10-CM

## 2021-01-20 DIAGNOSIS — G30.9: ICD-10-CM

## 2021-01-20 LAB
BUN BLD-MCNC: 45 MG/DL (ref 7–18)
GLUCOSE SERPLBLD-MCNC: 93 MG/DL (ref 74–106)
HCT VFR BLD CALC: 37.7 % (ref 39.6–49)
LYMPHOCYTES # SPEC AUTO: 2.5 K/UL (ref 0.7–4.9)
PMV BLD: 8.5 FL (ref 7.6–11.3)
POTASSIUM SERPL-SCNC: 4.1 MMOL/L (ref 3.5–5.1)
RBC # BLD: 4.27 M/UL (ref 4.33–5.43)

## 2021-01-20 PROCEDURE — 97161 PT EVAL LOW COMPLEX 20 MIN: CPT

## 2021-01-20 PROCEDURE — 83010 ASSAY OF HAPTOGLOBIN QUANT: CPT

## 2021-01-20 PROCEDURE — 86140 C-REACTIVE PROTEIN: CPT

## 2021-01-20 PROCEDURE — 85730 THROMBOPLASTIN TIME PARTIAL: CPT

## 2021-01-20 PROCEDURE — 83735 ASSAY OF MAGNESIUM: CPT

## 2021-01-20 PROCEDURE — 80053 COMPREHEN METABOLIC PANEL: CPT

## 2021-01-20 PROCEDURE — 84100 ASSAY OF PHOSPHORUS: CPT

## 2021-01-20 PROCEDURE — 85610 PROTHROMBIN TIME: CPT

## 2021-01-20 PROCEDURE — 87088 URINE BACTERIA CULTURE: CPT

## 2021-01-20 PROCEDURE — 94760 N-INVAS EAR/PLS OXIMETRY 1: CPT

## 2021-01-20 PROCEDURE — 51702 INSERT TEMP BLADDER CATH: CPT

## 2021-01-20 PROCEDURE — 87086 URINE CULTURE/COLONY COUNT: CPT

## 2021-01-20 PROCEDURE — 83615 LACTATE (LD) (LDH) ENZYME: CPT

## 2021-01-20 PROCEDURE — 74176 CT ABD & PELVIS W/O CONTRAST: CPT

## 2021-01-20 PROCEDURE — 94010 BREATHING CAPACITY TEST: CPT

## 2021-01-20 PROCEDURE — 85025 COMPLETE CBC W/AUTO DIFF WBC: CPT

## 2021-01-20 PROCEDURE — 97112 NEUROMUSCULAR REEDUCATION: CPT

## 2021-01-20 PROCEDURE — 82728 ASSAY OF FERRITIN: CPT

## 2021-01-20 PROCEDURE — 96361 HYDRATE IV INFUSION ADD-ON: CPT

## 2021-01-20 PROCEDURE — 70450 CT HEAD/BRAIN W/O DYE: CPT

## 2021-01-20 PROCEDURE — 87040 BLOOD CULTURE FOR BACTERIA: CPT

## 2021-01-20 PROCEDURE — 81003 URINALYSIS AUTO W/O SCOPE: CPT

## 2021-01-20 PROCEDURE — 85027 COMPLETE CBC AUTOMATED: CPT

## 2021-01-20 PROCEDURE — 99285 EMERGENCY DEPT VISIT HI MDM: CPT

## 2021-01-20 PROCEDURE — 94660 CPAP INITIATION&MGMT: CPT

## 2021-01-20 PROCEDURE — 83880 ASSAY OF NATRIURETIC PEPTIDE: CPT

## 2021-01-20 PROCEDURE — 84460 ALANINE AMINO (ALT) (SGPT): CPT

## 2021-01-20 PROCEDURE — 71045 X-RAY EXAM CHEST 1 VIEW: CPT

## 2021-01-20 PROCEDURE — 74018 RADEX ABDOMEN 1 VIEW: CPT

## 2021-01-20 PROCEDURE — 85379 FIBRIN DEGRADATION QUANT: CPT

## 2021-01-20 PROCEDURE — 82746 ASSAY OF FOLIC ACID SERUM: CPT

## 2021-01-20 PROCEDURE — 99251: CPT

## 2021-01-20 PROCEDURE — 85018 HEMOGLOBIN: CPT

## 2021-01-20 PROCEDURE — 94002 VENT MGMT INPAT INIT DAY: CPT

## 2021-01-20 PROCEDURE — 70551 MRI BRAIN STEM W/O DYE: CPT

## 2021-01-20 PROCEDURE — 97530 THERAPEUTIC ACTIVITIES: CPT

## 2021-01-20 PROCEDURE — 84450 TRANSFERASE (AST) (SGOT): CPT

## 2021-01-20 PROCEDURE — 83540 ASSAY OF IRON: CPT

## 2021-01-20 PROCEDURE — 96365 THER/PROPH/DIAG IV INF INIT: CPT

## 2021-01-20 PROCEDURE — 85044 MANUAL RETICULOCYTE COUNT: CPT

## 2021-01-20 PROCEDURE — 94003 VENT MGMT INPAT SUBQ DAY: CPT

## 2021-01-20 PROCEDURE — 93971 EXTREMITY STUDY: CPT

## 2021-01-20 PROCEDURE — 82565 ASSAY OF CREATININE: CPT

## 2021-01-20 PROCEDURE — 82947 ASSAY GLUCOSE BLOOD QUANT: CPT

## 2021-01-20 PROCEDURE — 84145 PROCALCITONIN (PCT): CPT

## 2021-01-20 PROCEDURE — 85014 HEMATOCRIT: CPT

## 2021-01-20 PROCEDURE — 83605 ASSAY OF LACTIC ACID: CPT

## 2021-01-20 PROCEDURE — 80048 BASIC METABOLIC PNL TOTAL CA: CPT

## 2021-01-20 PROCEDURE — 36415 COLL VENOUS BLD VENIPUNCTURE: CPT

## 2021-01-20 PROCEDURE — 97116 GAIT TRAINING THERAPY: CPT

## 2021-01-20 PROCEDURE — 81015 MICROSCOPIC EXAM OF URINE: CPT

## 2021-01-20 PROCEDURE — 97110 THERAPEUTIC EXERCISES: CPT

## 2021-01-20 RX ADMIN — Medication SCH ML: at 22:34

## 2021-01-20 NOTE — EDPHYS
Physician Documentation                                                                           

 [facility]                                                                 

Name: Rishi Joseph                                                                                

Age: 87 yrs                                                                                       

Sex: Male                                                                                         

: 1933                                                                                   

MRN: [id number]                                                                                   

Arrival Date: 2021                                                                          

Time: 16:16                                                                                       

Account#: [id number]                                                                            

Bed 19                                                                                            

Private MD:                                                                                       

ED Physician Damien Pleitez                                                                         

HPI:                                                                                              

                                                                                             

16:37 This 87 yrs old Black Male presents to ER via EMS with complaints of Altered Mental     rn  

      Status.                                                                                     

16:37 The patient presents with decreased mental status. Onset: The symptoms/episode          rn  

      began/occurred at an unknown time. Possible causes: unknown. Current symptoms: In the       

      emergency department the patient's symptoms have improved. It is unknown whether or not     

      the patient has had similar symptoms in the past. The patient has not recently seen a       

      physician. Per EMS, 911 called for AMS, patient cooperative and joking, denies any          

      problems. No fall. Reports cough 2 days ago. No fever. No abd pain/vomiting/diarrhea.       

      States eating and drinking fine. .                                                          

                                                                                                  

Historical:                                                                                       

- Allergies:                                                                                      

16:19 No Known Allergies;                                                                     em  

- PMHx:                                                                                           

16:19 BPH; Diabetes - NIDDM; GERD; High Cholesterol; Hypertension;                            em  

19:53 Dementia;                                                                               rr5 

- PSHx:                                                                                           

16:19 Knee surgery;                                                                           em  

                                                                                                  

- Immunization history:: Adult Immunizations unknown.                                             

- Social history:: Smoking status: unknown.                                                       

- Family history:: not pertinent.                                                                 

- Hospitalizations: : No recent hospitalization is reported.                                      

                                                                                                  

                                                                                                  

ROS:                                                                                              

16:37 Constitutional: Negative for fever, chills, and weight loss, Eyes: Negative for injury, rn  

      pain, redness, and discharge, Neck: Negative for injury, pain, and swelling,                

      Cardiovascular: Negative for chest pain, palpitations, and edema, Respiratory: Negative     

      for shortness of breath, cough, wheezing, and pleuritic chest pain, Abdomen/GI:             

      Negative for abdominal pain, nausea, vomiting, diarrhea, and constipation, Back:            

      Negative for injury and pain, MS/Extremity: Negative for injury and deformity, Skin:        

      Negative for injury, rash, and discoloration, Neuro: Negative for headache, weakness,       

      numbness, tingling, and seizure.                                                            

                                                                                                  

Exam:                                                                                             

16:37 Constitutional:  This is a well developed, well nourished patient who is awake, alert   rn  

      Head/Face:  Normocephalic, atraumatic. Cardiovascular:  Regular rate and rhythm.  No        

      pulse deficits. Respiratory:  No increased work of breathing, no retractions or nasal       

      flaring. Abdomen/GI:  soft, non-tender Skin:  Warm, dry MS/ Extremity:  Pulses equal,       

      no cyanosis. Neuro:  Awake and alert, GCS 15, oriented to person and place.  Moves all      

      4 ext, sensation grossly intact.                                                            

                                                                                                  

Vital Signs:                                                                                      

16:16  / 64; Pulse 67; Resp 14; Temp 99.6; Pulse Ox 100% ; Pain 0/10;                   em  

17:30  / 68; Pulse 68; Resp 16; Pulse Ox 100% on R/A;                                   em  

18:52  / 74; Pulse 66; Resp 18; Pulse Ox 99% on R/A; Pain 0/10;                         em  

19:41  / 79; Pulse 60; Resp 16; Temp 98; Pulse Ox 98% ;                                 rr5 

20:30  / 62; Pulse 62; Resp 17; Pulse Ox 98% ;                                          rr5 

21:30  / 74; Pulse 65; Resp 19; Temp 98.2; Pulse Ox 99% ;                               rr5 

                                                                                                  

Margie Coma Score:                                                                               

19:41 Eye Response: spontaneous(4). Verbal Response: confused(4). Motor Response: obeys       rr5 

      commands(6). Total: 14.                                                                     

20:30 Eye Response: spontaneous(4). Verbal Response: confused(4). Motor Response: obeys       rr5 

      commands(6). Total: 14.                                                                     

21:30 Eye Response: spontaneous(4). Verbal Response: confused(4). Motor Response: obeys       rr5 

      commands(6). Total: 14.                                                                     

                                                                                                  

MDM:                                                                                              

16:18 Patient medically screened.                                                             rn  

18:52 Differential Diagnosis: pneumonia, UTI, volume depletion. Differential Diagnosis:       rn  

      electrolyte abnormality. Data reviewed: vital signs, nurses notes. Data reviewed: lab       

      test result(s), EKG, radiologic studies, plain films, and as a result, I will admit         

      patient. Counseling: I had a detailed discussion with the patient and/or guardian           

      regarding: the historical points, exam findings, and any diagnostic results supporting      

      the discharge/admit diagnosis, lab results, radiology results, the need for further         

      work-up and treatment in the hospital. Admission orders: after a detailed discussion of     

      the patient's condition and case, the admit orders are written by me. ED course: Spoke      

      with family member, seems called 911 for hypotension, not so much confusion, does not       

      seem confued here, but hypotension and low grade temperature concerning given no source     

      of infection found yet, COVID pending, spoke with Dr. Rodrigues, will admit for               

      observation..                                                                               

                                                                                                  

                                                                                             

16:26 Order name: CBC with Diff; Complete Time: 18:17                                         rn  

                                                                                             

16:26 Order name: Basic Metabolic Panel; Complete Time: 18:17                                 rn  

                                                                                             

16:26 Order name: Urine Culture                                                               rn  

                                                                                             

16:26 Order name: Urine Microscopic Only; Complete Time: 19:14                                rn  

                                                                                             

16:26 Order name: Procalcitonin; Complete Time: 19:14                                         rn  

                                                                                             

16:26 Order name: XRAY Chest (1 view); Complete Time: 17:16                                   rn  

                                                                                             

16:26 Order name: Blood Culture Adult (2)                                                     rn  

                                                                                             

16:26 Order name: CT Head Brain wo Cont; Complete Time: 17:16                                 rn  

                                                                                             

16:37 Order name: Lactate; Complete Time: 18:40                                                 

                                                                                             

18:52 Order name: Urine Dipstick--Ancillary (enter results); Complete Time: 19:14               

                                                                                             

19:28 Order name: SARS-COV-2 RT PCR                                                           EDMS

                                                                                             

16:26 Order name: IV Start; Complete Time: 16:37                                              rn  

                                                                                             

16:26 Order name: Urine Dipstick-Ancillary (obtain specimen); Complete Time: 19:06            rn  

                                                                                             

16:26 Order name: EKG; Complete Time: 16:27                                                   rn  

                                                                                             

16:26 Order name: EKG - Nurse/Tech; Complete Time: 19:06                                      rn  

                                                                                                  

Administered Medications:                                                                         

17:07 Drug: NS 0.9% 500 ml Route: IV; Rate: bolus; Site: right antecubital;                   em  

17:47 Follow up: IV Status: Completed infusion; IV Intake: 500ml                              em  

19:28 Drug: Rocephin 1 grams Route: IV; Rate: calculated rate; Site: right antecubital;       rr5 

20:00 Follow up: Response: No adverse reaction; IV Status: Completed infusion; IV Intake: [id number] 

                                                                                                  

                                                                                                  

Disposition:                                                                                      

21 18:56 Hospitalization ordered by Abdullahi Rodrigues for Observation. Preliminary            

  diagnosis are Dehydration, Fever of other and unknown origin, Weakness.                         

- Bed requested for Telemetry/MedSurg (observation).                                              

- Status is Observation.                                                                      rr5 

- Condition is Stable.                                                                            

- Problem is new.                                                                                 

- Symptoms have improved.                                                                         

                                                                                                  

                                                                                                  

                                                                                                  

Signatures:                                                                                       

Dispatcher [employer]                           Columba Devine RN RN dw Munoz, Edgar, RN                        Damien Hinds MD MD rn Roque, Raymond, RN                      RN   rr5                                                  

                                                                                                  

Corrections: (The following items were deleted from the chart)                                    

18:29 16:27 CORONAVIRUS+MR.LAB.BRZ ordered. [facility]

20:09 18:56 Hospitalization Ordered by Abdullahi Rodrigues MD for Observation. Preliminary           

      diagnosis is Dehydration; Fever of other and unknown origin; Weakness. Bed requested        

      for Telemetry/MedSurg (observation). Status is Observation. Condition is Stable.            

      Problem is new. Symptoms have improved. rn                                                  

22:24 20:09 2021 18:56 Hospitalization Ordered by Abdullahi Rodrigues MD for Observation.    rr5 

      Preliminary diagnosis is Dehydration; Fever of other and unknown origin; Weakness. Bed      

      requested for Telemetry/MedSurg (observation). Status is Observation. Condition is          

      Stable. Problem is new. Symptoms have improved. dw                                          

                                                                                                  

**************************************************************************************************

## 2021-01-20 NOTE — RAD REPORT
EXAM DESCRIPTION:  RAD - Chest Single View - 1/20/2021 5:08 pm

 

CLINICAL HISTORY:  COUGH

Chest pain.

 

COMPARISON:  Chest Single View dated 7/30/2019; Chest Single View dated 7/29/2019; Chest Pa And Lat (
2 Views) dated 7/27/2018; Chest Pa And Lat (2 Views) dated 10/13/2017

 

FINDINGS:  Portable technique limits examination quality.

 

The lungs are grossly clear. The heart is upper limit normal in size. No displaced fractures.

 

IMPRESSION:  No acute intrathoracic process suspected.

## 2021-01-20 NOTE — XMS REPORT
Continuity of Care Document

                           Created on:2021



Patient:ZUNILDA HINES

Sex:Male

:1933

External Reference #:[id number]





Demographics







                          Address                   [address]



                                                    [address] 



                                                    [place] [address]

 

                          Home Phone                [phone]

 

                          Mobile Phone              [phone]

 

                          Email Address             NONE

 

                          Preferred Language        English

 

                          Marital Status            Unknown

 

                          Yazdanism Affiliation     Unknown

 

                          Race                      Unknown

 

                          Additional Race(s)        Black or 



                                                    Unavailable

 

                          Ethnic Group              Unknown









Author







                          Organization              [facility]

t

 

                          Address                   [address]



                                                    [place] [address]

 

                          Phone                     [phone]









Support







                Name            Relationship    Address         Phone

 

                Will Hines Daughter        Unavailable     [phone]

 

                PetYaritza nice Daughter        Unavailable     [phone]









Care Team Providers







                    Name                Role                Phone

 

                    Abdullahi Rodrigues MD  Primary Care Physician [phone]

 

                    Khurram Kaba MD, Jenise Zavala Attending Clinician [phone] 

 

                    JONN DAWSON             Attending Clinician Unavailable

 

                    TRINIDAD GOLDMAN    Attending Clinician Unavailable

 

                    HELADIO MUÑOZ         Admitting Clinician Unavailable

 

                    TRINIDAD GOLDMAN    Admitting Clinician Unavailable









Problems







       Condition Condition Condition Status Onset  Resolution Last   Treating Co

mments 

Source



       Name   Details Category        Date   Date   Treatment Clinician        



                                                 Date                 

 

       Altered Altered Disease Active                              [facility]



       mental mental                                              [facility]



       status status               00:00:                             Medical



                                   00                                 [place]

 

       Syncope, Syncope, Disease Active                              [facility]

[facility]



       unspecifie unspecifie                                              [facility]

[facility]



       d syncope d syncope               00:00:                             Medi

dan[facility]



       type   type                 00                                 Center

 

       Carotid Carotid Disease Active                              [facility]



       stenosis stenosis                                              [facility] 

-



                                   00:00:                             Medical



                                   00                                 Center

 

       S/P    S/P    Disease Active                              [facility]



       carotid carotid                                              [facility]



       endarterec endarterec               00:00:                             Me

dical



       roseann   roseann                 00                                 Center



       (Jones (Jones                                                  



       )  )                                                   

 

       Respirator Respirator Disease Active                              [facility]

[facility]



       y      y                                                   [facility]



       insufficie insufficie               00:00:                             Me

dical



       ncy    ncy                  00                                 Center

 

       Hypertensi Hypertensi Disease Active                              [facility]

[facility]



       ve urgency ve urgency                                              [facility]

[facility] -



                                   00:00:                             Medical



                                   00                                 [place]

 

       Diabetes Diabetes Disease Active 2019-0                             [facility]

[facility]



       mellitus mellitus               8-20                               [facility] 

-



                                   00:00:                             Medical



                                   00                                 Center

 

       GERD   GERD   Disease Active                              [facility]



       (gastroeso (gastroeso               8-                               [facility]

[facility] -



       phageal phageal               00:00:                             Medical



       reflux reflux               00                                 Center



       disease) disease)                                                  

 

       Hyperlipid Hyperlipid Disease Active                              [facility]

[facility]



       emia   emia                 20                               [facility]



                                   00:00:                             Medical



                                   00                                 Center

 

       Hypertensi Hypertensi Disease Active                              [facility]

[facility]



       on     on                                                  [facility]



                                   00:00:                             Medical



                                   00                                 Center

 

       Carotid Carotid Disease Active                                    [facility]



       artery artery                                                  [facility]



       occlusion occlusion                                                  [facility]







Allergies, Adverse Reactions, Alerts

This patient has no known allergies or adverse reactions.



Family History







           Family Member Diagnosis  Comments   Start Date Stop Date  Source

 

           Natural brother Heart disease                                  [facility]

 

           Natural father Heart disease                                  [facility]

 

           Natural father Hyperlipidemia                                  [facility]

 

           Natural father Hypertension                                  [facility]

 

           Natural mother Heart disease                                  [facility]

 

           Natural mother Hyperlipidemia                                  [facility]

 

           Natural mother Hypertension                                  [facility]

 

           Natural sister Heart disease                                  [facility]







Social History







           Social Habit Start Date Stop Date  Quantity   Comments   Source

 

           History [facility]

 -



           Alcohol Std Drinks                                             [facility]

[facility]

 

           History [facility]



           Alcohol Binge                                             Medical [facility]

ter

 

           Sex Assigned At                                             [facility]

 

           Tobacco use and 2019 Never used            [facility] -



           exposure   00:00:00   00:00:00                         [facility]

 

           Alcohol intake 2019 Current               [facility]

es -



                      00:00:00   00:00:00   non-drinker of            Medical 

nter



                                            alcohol               



                                            (finding)             

 

           History SDOH 2019 1                     [facility]

 -



           Alcohol Frequency 00:00:00   00:00:00                         [facility]









                Smoking Status  Start Date      Stop Date       Source

 

                Never smoker                                    [facility]

edical [place]







Medications







       Ordered Filled Start  Stop   Current Ordering Indication Dosage Frequency

 Signature

                    Comments            Components          Source



     Medication Medication Date Date Medication? Clinician                (SIG) 

          



     Name Name                                                   

 

     losartan      2019- No             25mg QD   Take 1           [facility]



     (COZAAR) 25      0-03 10-02                          tablet (25           [facility]

ukes -



     MG tablet      00:00: 23:59                          mg total)           Me

dical



               00   :00                           by mouth           Center



                                                  daily.           

 

     glipiZIDE      2019      Yes            5mg  QD   Take 1           [facility]



     (GLUCOTROL)      0-02                               tablet (5           [place]

[facility] -



     5 MG tablet      00:00:                               mg total)           M

edical



               00                                 by mouth           Center



                                                  daily.           

 

     famotidine      2019      Yes            20mg QD   Take 1           [facility]

[facility]



     (PEPCID) 20      0-02                               tablet (20           [facility]

[facility] -



     MG tablet      00:00:                               mg total)           Med

ical



               00                                 by mouth           Center



                                                  daily.           

 

     aspirin 81      2020- No             81mg QD   Take 1           [facility]



     MG EC                                tablet (81           [facility]



     tablet      00:00: 23:59                          mg total)           Medic

al



               00   :00                           by mouth           Center



                                                  daily.           

 

     clopidogrel      2020- No             75mg QD   Take 1           [facility]



     (PLAVIX) 75                                tablet (75           L

ukes -



     mg tablet      00:00: 23:59                          mg total)           Me

dical



               00   :00                           by mouth           Center



                                                  daily.           

 

     carvedilol            Yes            12.5mg Q.5D Take 12.5           

[facility]



     (COREG)      7-25                               mg by           [facility]



     12.5 MG      00:00:                               mouth 2           Medical



     tablet      00                                 (two)           Center



                                                  times           



                                                  daily.           

 

     tamsulosin            Yes            .4mg Q.5D Take 0.4           [facility]



     (FLOMAX)      7-01                               mg by           [facility]



     0.4 mg Cap      00:00:                               mouth 2           Medi

daniel



     24 hr      00                                 (two)           Center



     capsule                                         times           



                                                  daily.           

 

     lactulose            Yes                      TK 30 ML           [facility]



     (GENERLAC)      2-15                               PO BID PRF           [place]

es -



     10 gram/15      00:00:                               CONSTIPATI           M

edical



     mL solution      00                                 ON             Center

 

     lovastatin            Yes            20mg QD   Take 20 mg           C

[facility]



     (MEVACOR)      1-06                               by mouth           [facility] 

-



     20 MG      00:00:                               every           Medical



     tablet      00                                 evening.           Center

 

     amLODIPine            Yes            10mg QD   Take 10 mg           C

[facility]



     (NORVASC)      1-05                               by mouth           [facility] 

-



     10 MG      00:00:                               daily.           Medical



     tablet      00                                                Center







Procedures

This patient has no known procedures.



Plan of Care







             Planned Activity Planned Date Details      Comments     Source

 

             Future Scheduled 2020   INFLUENZA VACCINE (#1)              [facility]

[facility]



             Test         00:00:00     [code = INFLUENZA              Medical Ce

nter



                                       VACCINE (#1)]              

 

             Future Scheduled 2020   Urine screening for              [facility]



             Test         00:00:00     protein (procedure)              Medical 

Center



                                       [code = 894710985]              

 

             Future Scheduled 2020   Hemoglobin A1c              [facility]

[facility] -



             Test         00:00:00     measurement               Medical Center



                                       (procedure) [code =              



                                       07992801]                 

 

             Future Scheduled 2020   MEDICARE ANNUAL              [facility]

[facility] -



             Test         00:00:00     WELLNESS (YEAR 2 or              Medical 

Center



                                       FIRST YEAR if no IPPE)              



                                       [code = MEDICARE              



                                       ANNUAL WELLNESS (YEAR              



                                       2 or FIRST YEAR if no              



                                       IPPE)]                    

 

             Future Scheduled 1998-10-03   PNEUMOCOCCAL 65+ YRS              [facility]



             Test         00:00:00     (1 of 1 -                 Medical Center



                                       [id number])              



                                       [code = PNEUMOCOCCAL              



                                       65+ YRS (1 of 1 -              



                                       [id number])]              

 

             Future Scheduled 1943-10-03   DIABETIC EYE EXAM              [facility]



             Test         00:00:00     [code = DIABETIC EYE              Medical

 Center



                                       EXAM]                     

 

             Future Scheduled 1943-10-03   Diabetic foot              [facility]

es -



             Test         00:00:00     examination               Medical Center



                                       (regime/therapy) [code              



                                       = 205290461]              







Encounters







        Start   End     Encounter Admission Attending Care    Care    Encounter 

Source



        Date/Time Date/Time Type    Type    Clinicians Facility Department ID   

   

 

        2020 Office          Khurram TORRES     [phone] [id number]

[id number] 



        13:53:57 14:23:57 Visit           Sendy, AMBULATOR [id number]         



                                        Jenise  Y       [id number]         



                                        Sally          [phone]         



                                                        300             







Results







           Test Description Test Time  Test Comments Results    Result Comments 

Source









                    POCT-GLUCOSE METER  2019-10-02 12:05:00 









                      Test Item  Value      Reference Range Interpretation Comme

nts









             POC-GLUCOSE METER ([employer]) (test 140 mg/dL           H       

     TESTED AT [facility] [address] [facility]



             code = 1538)                                        [employer] [facility] [employer] [address]

0



POCT-GLUCOSE METER2019-10-02 09:37:00





             Test Item    Value        Reference Range Interpretation Comments

 

             POC-GLUCOSE METER 133 mg/dL           H            TESTED AT 

[facility] [address]



             ([employer]) (test code =                                        [place]

[place] [employer]



             1538)                                               [id number]



BASIC METABOLIC PANEL2019-10-02 06:58:00





             Test Item    Value        Reference Range Interpretation Comments

 

             SODIUM (BEAKER) 135 meq/L    136-145      L            



             (test code = 381)                                        

 

             POTASSIUM (BEAKER) 3.9 meq/L    3.5-5.1                   



             (test code = 379)                                        

 

             CHLORIDE (BEAKER) 105 meq/L                        



             (test code = 382)                                        

 

             CO2 (BEAKER) (test 22 meq/L     22-29                     



             code = 355)                                         

 

             BLOOD UREA NITROGEN 27 mg/dL     7-21         H            



             (BEAKER) (test code                                        



             = 354)                                              

 

             CREATININE (BEAKER) 1.61 mg/dL   0.57-1.25    H            



             (test code = 358)                                        

 

             GLUCOSE RANDOM 116 mg/dL           H            



             (BEAKER) (test code                                        



             = 652)                                              

 

             CALCIUM (BEAKER) 9.2 mg/dL    8.4-10.2                  



             (test code = 697)                                        

 

             EGFR (BEAKER) (test 50 mL/min/1.73                           [facility]

YESSICA GFR IS



             code = 1092) sq m                                   NOT AS ACCURATE

 AS



                                                                 CREATININE



                                                                 CLEARANCE IN



                                                                 PREDICTING



                                                                 GLOMERULAR



                                                                 FILTRATION RATE

.



                                                                 ESTIMATED GFR I

S



                                                                 NOT APPLICABLE 

FOR



                                                                 DIALYSIS PATIEN

TS.



POCT-GLUCOSE METER2019-10-01 21:04:00





             Test Item    Value        Reference Range Interpretation Comments

 

             POC-GLUCOSE METER 161 mg/dL           H            TESTED AT 

[facility] [address]



             (BEAKER) (test code =                                        [place]



             1538)                                               [id number]



EEG AWAKE AND DROWSY2019-10-01 18:24:00Reason for exam:-&gt;CEREBROVASCULAR 
ACCIDENTDate(s) of EEG:  10/1/2019     DATE OF REPORT:  10/1/2019 ACC:   
[id number] EEG Number: [id number] TestLocation: Inpatient Room Start time: 
10/1/2019 09:03 Stop time: 10/1/2019 09:24 ICD-10:   R41.82, R55 CPT Code:   
35657    HISTORY:   85 y.o. male with hypertension, chronic kidney disease, 
right carotid stenosis status post right CEA who presented with syncope and 
altered mental status.    MEDICATIONS THAT COULD AFFECT EEG:  Amlodipine, 
Atorvastatin, Carvedilol, Famotidine, Losartan, Tamsulosin   TECHNICAL SUMMARY: 
This is a digital video-EEG recorded with 32 input channels reviewed with 
bipolar and referential montages using the modified combinatorial system 
nomenclature.    DESCRIPTION OF RECORD:  During the maximally alert state a 8.5 
Hz posterior dominant rhythm was seen that was symmetric, reactive to eye 
opening and well regulated.  More anteriorly, low voltage frontocentral beta 
predominated.  Drowsiness was characterized by decreased eye blinks, alpha 
attenuation, increased frontocentral theta, and increased frontocentral delta. 
Stage 2 sleep was not reached.    SIGNIFICANT VIDEO EVENTS: None   SIGNIFICANT 
ELECTROCARDIOGRAM EVENTS: None   HV: Hyperventilation was not performed.     PH
OTIC STIMULATION: Photic stimulation was done from 3-30 Hz; no photic driving 
was seen; photoparoxysmal responses were absent.     IMPRESSION: Normal Awake 
and Drowsy EEG   CLINICAL CORRELATION: An EEGwithout epileptiform discharges 
does not exclude the possibility of epilepsy.  If the clinical suspicion of 
epilepsy remains, consider additional EEG recordings.      Paulo Nino MD 
NeurophysiologyFellow I have reviewed the electroencephalogram and this report 
and agree with its interpretation.  Doni Ortiz MD Neurophysiology Attending
       Electronically signed by: DONI ORTIZ MD on 10/01/2019 06:24 PMPOCT-
GLUCOSE METER2019-10-01 18:04:00





             Test Item    Value        Reference Range Interpretation Comments

 

             POC-GLUCOSE METER 111 mg/dL           H            TESTED AT 

[place] [address]



             ([facility]) (test code =                                        [facility]



             1538)                                               [address]



POCT-GLUCOSE [id number] 14:11:00





             Test Item    Value        Reference Range Interpretation Comments

 

             POC-GLUCOSE METER 113 mg/dL           H            TESTED AT 

[place] [address]



             ([facility]) (test code =                                        [facility]



             1538)                                               [address]



POCT-GLUCOSE [id number] 08:00:00





             Test Item    Value        Reference Range Interpretation Comments

 

             POC-GLUCOSE METER 107 mg/dL                        TESTED AT 

[place] [address]



             ([facility]) (test code =                                        [facility]



             1538)                                               [id number]



BASIC METABOLIC PANEL2019-10-01 07:50:00





             Test Item    Value        Reference Range Interpretation Comments

 

             SODIUM (BEAKER) 137 meq/L    136-145                   



             (test code = 381)                                        

 

             POTASSIUM (BEAKER) 3.8 meq/L    3.5-5.1                   



             (test code = 379)                                        

 

             CHLORIDE (BEAKER) 104 meq/L                        



             (test code = 382)                                        

 

             CO2 (BEAKER) (test 25 meq/L     22-29                     



             code = 355)                                         

 

             BLOOD UREA NITROGEN 34 mg/dL     7-21         H            



             ([facility]) (test code                                        



             = 354)                                              

 

             CREATININE (BEAKER) 1.90 mg/dL   0.57-1.25    H            



             (test code = 358)                                        

 

             GLUCOSE RANDOM 104 mg/dL                        



             (BEAKER) (test code                                        



             = 652)                                              

 

             CALCIUM (BEAKER) 9.2 mg/dL    8.4-10.2                  



             (test code = 697)                                        

 

             EGFR (BEAKER) (test 41 mL/min/1.73                           ESTIMA

YESSICA GFR IS



             code = 1092) sq m                                   NOT AS ACCURATE

 AS



                                                                 CREATININE



                                                                 CLEARANCE IN



                                                                 PREDICTING



                                                                 GLOMERULAR



                                                                 FILTRATION RATE

.



                                                                 ESTIMATED GFR I

S



                                                                 NOT APPLICABLE 

FOR



                                                                 DIALYSIS PATIEN

TS.



CBC (HEMOGRAM ONLY)2019-10-01 07:24:00





             Test Item    Value        Reference Range Interpretation Comments

 

             WHITE BLOOD CELL COUNT (BEAKER) 10.0 K/ L    3.5-10.5              

    



             (test code = 775)                                        

 

             RED BLOOD CELL COUNT (BEAKER) 3.68 M/ L    4.63-6.08    L          

  



             (test code = 761)                                        

 

             HEMOGLOBIN (BEAKER) (test code = 11.3 GM/DL   13.7-17.5    L       

     



             410)                                                

 

             HEMATOCRIT (BEAKER) (test code = 33.6 %       40.1-51.0    L       

     



             411)                                                

 

             MEAN CORPUSCULAR VOLUME (BEAKER) 91.3 fL      79.0-92.2            

     



             (test code = 753)                                        

 

             MEAN CORPUSCULAR HEMOGLOBIN 30.7 pg      25.7-32.2                 



             (BEAKER) (test code = 751)                                        

 

             MEAN CORPUSCULAR HEMOGLOBIN CONC 33.6 GM/DL   32.3-36.5            

     



             (BEAKER) (test code = 752)                                        

 

             RED CELL DISTRIBUTION WIDTH 13.7 %       11.6-14.4                 



             (BEAKER) (test code = 412)                                        

 

             PLATELET COUNT (BEAKER) (test 198 K/CU MM  150-450                 

  



             code = 756)                                         

 

             MEAN PLATELET VOLUME (BEAKER) 10.6 fL      9.4-12.4                

  



             (test code = 754)                                        

 

             NUCLEATED RED BLOOD CELLS 0 /100 WBC   0-0                       



             (BEAKER) (test code = 413)                                        



POCT-GLUCOSE [id number] 21:38:00





             Test Item    Value        Reference Range Interpretation Comments

 

             POC-GLUCOSE METER 110 mg/dL                        TESTED AT 

[facility] [address]



             (BEAKER) (test code =                                        [place]



             [address]38)                                               [id number]



POCT-GLUCOSE [id number] 17:14:00





             Test Item    Value        Reference Range Interpretation Comments

 

             POC-GLUCOSE METER 146 mg/dL           H            TESTED AT 

[place] [address]



             ([facility]) (test code =                                        [place] [facility] [employer] [facility]



             1538)                                               [address]



POCT-GLUCOSE [id number] 12:23:00





             Test Item    Value        Reference Range Interpretation Comments

 

             POC-GLUCOSE METER 139 mg/dL           H            TESTED AT 

[place] [address]



             ([facility]) (test code =                                        [place] [facility]



             1538)                                               [address]



POCT-GLUCOSE [id number] 10:11:00





             Test Item    Value        Reference Range Interpretation Comments

 

             POC-GLUCOSE METER 118 mg/dL           H            TESTED AT 

[place] [address]



             ([facility]) (test code =                                        [place] [facility]



             1538)                                               [address]



POCT-GLUCOSE [id number] 20:41:00





             Test Item    Value        Reference Range Interpretation Comments

 

             POC-GLUCOSE METER 118 mg/dL           H            TESTED AT 

[place] [address]



             ([facility]) (test code =                                        [place] [facility]



             1538)                                               [address]



POCT-GLUCOSE [id number] 18:31:00





             Test Item    Value        Reference Range Interpretation Comments

 

             POC-GLUCOSE METER 110 mg/dL                        TESTED AT 

[place] [address]



             ([facility]) (test code =                                        [place] [facility]



             1538)                                               [address]



POCT-GLUCOSE [id number] 14:25:00





             Test Item    Value        Reference Range Interpretation Comments

 

             POC-GLUCOSE METER 101 mg/dL                        TESTED AT 

[place] [address]



             ([facility]) (test code =                                        [place]

[place] [facility]



             1538)                                               [address]



[id number] 12:15:00





             Test Item    Value        Reference Range Interpretation Comments

 

             RPR SCREEN ([facility]) (test code = Nonreactive  Nonreactive          

     



             420)                                                



POCT-GLUCOSE [id number] 10:11:00





             Test Item    Value        Reference Range Interpretation Comments

 

             POC-GLUCOSE METER 98 mg/dL                         TESTED AT 

[place] [address]



             ([facility]) (test code =                                        [facility]

[place] [facility] [address]



             1538)                                               



BASIC METABOLIC [id number] 06:57:00





             Test Item    Value        Reference Range Interpretation Comments

 

             SODIUM (BEAKER) 138 meq/L    136-145                   



             (test code = 381)                                        

 

             POTASSIUM (BEAKER) 4.2 meq/L    3.5-5.1                   



             (test code = 379)                                        

 

             CHLORIDE (BEAKER) 104 meq/L                        



             (test code = 382)                                        

 

             CO2 (BEAKER) (test 25 meq/L     22-29                     



             code = 355)                                         

 

             BLOOD UREA NITROGEN 24 mg/dL     7-21         H            



             (BEAKER) (test code                                        



             = 354)                                              

 

             CREATININE (BEAKER) 1.82 mg/dL   0.57-1.25    H            



             (test code = 358)                                        

 

             GLUCOSE RANDOM 104 mg/dL                        



             (BEAKER) (test code                                        



             = 652)                                              

 

             CALCIUM (BEAKER) 9.9 mg/dL    8.4-10.2                  



             (test code = 697)                                        

 

             EGFR (BEAKER) (test 43 mL/min/1.73                           ESTIMA

YESSICA GFR IS



             code = 1092) sq m                                   NOT AS ACCURATE

 AS



                                                                 CREATININE



                                                                 CLEARANCE IN



                                                                 PREDICTING



                                                                 GLOMERULAR



                                                                 FILTRATION RATE

.



                                                                 ESTIMATED GFR I

S



                                                                 NOT APPLICABLE 

FOR



                                                                 DIALYSIS PATIEN

TS.



CBC (HEMOGRAM ONLY)2019 06:35:00





             Test Item    Value        Reference Range Interpretation Comments

 

             WHITE BLOOD CELL COUNT (BEAKER) 7.9 K/ L     3.5-10.5              

    



             (test code = 775)                                        

 

             RED BLOOD CELL COUNT (BEAKER) 3.92 M/ L    4.63-6.08    L          

  



             (test code = 761)                                        

 

             HEMOGLOBIN (BEAKER) (test code = 11.8 GM/DL   13.7-17.5    L       

     



             410)                                                

 

             HEMATOCRIT (BEAKER) (test code = 36.6 %       40.1-51.0    L       

     



             411)                                                

 

             MEAN CORPUSCULAR VOLUME (BEAKER) 93.4 fL      79.0-92.2    H       

     



             (test code = 753)                                        

 

             MEAN CORPUSCULAR HEMOGLOBIN 30.1 pg      25.7-32.2                 



             (BEAKER) (test code = 751)                                        

 

             MEAN CORPUSCULAR HEMOGLOBIN CONC 32.2 GM/DL   32.3-36.5    L       

     



             (BEAKER) (test code = 752)                                        

 

             RED CELL DISTRIBUTION WIDTH 13.6 %       11.6-14.4                 



             (BEAKER) (test code = 412)                                        

 

             PLATELET COUNT (BEAKER) (test 214 K/CU MM  150-450                 

  



             code = 756)                                         

 

             MEAN PLATELET VOLUME (BEAKER) 10.6 fL      9.4-12.4                

  



             (test code = 754)                                        

 

             NUCLEATED RED BLOOD CELLS 0 /100 WBC   0-0                       



             (BEAKER) (test code = 413)                                        



POCT-GLUCOSE [id number] 21:33:00





             Test Item    Value        Reference Range Interpretation Comments

 

             POC-GLUCOSE METER 91 mg/dL                         TESTED AT 

[facility] [address]



             (BEAKER) (test code =                                        [place] [facility] [address]



             [address]38)                                               



POCT-GLUCOSE [id number] 17:12:00





             Test Item    Value        Reference Range Interpretation Comments

 

             POC-GLUCOSE METER 128 mg/dL           H            TESTED AT 

[facility] [address]



             (BEAKER) (test code =                                        [place] [facility]



             1538)                                               [address]



POCT-GLUCOSE [id number] 17:08:00





             Test Item    Value        Reference Range Interpretation Comments

 

             POC-GLUCOSE METER 115 mg/dL           H            TESTED AT 

[facility] [address]



             (BEAKER) (test code =                                        [place] [facility]



             1538)                                               [address]



BASIC METABOLIC [id number] 08:50:00





             Test Item    Value        Reference Range Interpretation Comments

 

             SODIUM (BEAKER) 136 meq/L    136-145                   



             (test code = 381)                                        

 

             POTASSIUM (BEAKER) 4.3 meq/L    3.5-5.1                   Specimen 

slightly



             (test code = 379)                                        hemolyzed

 

             CHLORIDE (BEAKER) 103 meq/L                        



             (test code = 382)                                        

 

             CO2 (BEAKER) (test 24 meq/L     22-29                     



             code = 355)                                         

 

             BLOOD UREA NITROGEN 21 mg/dL     7-21                      



             (BEAKER) (test code                                        



             = 354)                                              

 

             CREATININE (BEAKER) 1.64 mg/dL   0.57-1.25    H            Specimen

 slightly



             (test code = 358)                                        hemolyzed

 

             GLUCOSE RANDOM 113 mg/dL           H            



             (BEAKER) (test code                                        



             = 652)                                              

 

             CALCIUM (BEAKER) 9.7 mg/dL    8.4-10.2                  



             (test code = 697)                                        

 

             EGFR (BEAKER) (test 49 mL/min/1.73                           ESTIMA

YESSICA GFR IS



             code = 1092) sq m                                   NOT AS ACCURATE

 AS



                                                                 CREATININE



                                                                 CLEARANCE IN



                                                                 PREDICTING



                                                                 GLOMERULAR



                                                                 FILTRATION RATE

.



                                                                 ESTIMATED GFR I

S



                                                                 NOT APPLICABLE 

FOR



                                                                 DIALYSIS PATIEN

TS.



CBC (HEMOGRAM ONLY)2019 08:36:00





             Test Item    Value        Reference Range Interpretation Comments

 

             WHITE BLOOD CELL COUNT (BEAKER) 9.0 K/ L     3.5-10.5              

    



             (test code = 775)                                        

 

             RED BLOOD CELL COUNT (BEAKER) 4.20 M/ L    4.63-6.08    L          

  



             (test code = 761)                                        

 

             HEMOGLOBIN (BEAKER) (test code = 12.6 GM/DL   13.7-17.5    L       

     



             410)                                                

 

             HEMATOCRIT (BEAKER) (test code = 39.2 %       40.1-51.0    L       

     



             411)                                                

 

             MEAN CORPUSCULAR VOLUME (BEAKER) 93.3 fL      79.0-92.2    H       

     



             (test code = 753)                                        

 

             MEAN CORPUSCULAR HEMOGLOBIN 30.0 pg      25.7-32.2                 



             (BEAKER) (test code = 751)                                        

 

             MEAN CORPUSCULAR HEMOGLOBIN CONC 32.1 GM/DL   32.3-36.5    L       

     



             (BEAKER) (test code = 752)                                        

 

             RED CELL DISTRIBUTION WIDTH 13.7 %       11.6-14.4                 



             (BEAKER) (test code = 412)                                        

 

             PLATELET COUNT (BEAKER) (test 214 K/CU MM  150-450                 

  



             code = 756)                                         

 

             MEAN PLATELET VOLUME (BEAKER) 10.1 fL      9.4-12.4                

  



             (test code = 754)                                        

 

             NUCLEATED RED BLOOD CELLS 0 /100 WBC   0-0                       



             (BEAKER) (test code = 413)                                        



MR, BRAIN, WITHOUT [id number] 06:09:00Reason for exam:-
&gt;CEREBROVASCULAR ACCIDENTFINAL REPORT PATIENT ID:   [id number] MRI Brain 
without contrast CLINICAL HISTORY: Dementia, vascular suspectedCEREBROVASCULAR 
ACCIDENT Technique: MRI of the brain utilizing axial T2, FLAIR, GRE, DWI; sag
ittal and coronal T1-weighted images. Comparisons: CT head 2019 
Findings: There is no evidence of acute infarct or hemorrhage. There is no 
midline shift. There are no extra-axial fluid collections. The craniocervical 
junction is preserved. Punctate chronic microhemorrhage involving the left 
cerebellar hemisphere and posterior left temporal lobe. Moderate parenchymal 
volume loss with temporal lobe predominance. Lateral ventriculomegaly present. 
Confluent cerebral white matter T2 FLAIR hyperintensities. Chronic lacunar 
infarcts of the basal ganglia and left cerebellar hemisphere. Chronic ischemic 
changes of the thalami also noted. The orbits and globes are unremarkable. Mild 
paranasal sinus mucosal thickening. Diminutive right vertebral artery, and 
hypoplastic V4 segment flow void, likely hypoplastic. IMPRESSION: No acute 
infarct. Moderate chronic microangiopathic ischemic changes. Lateral 
ventriculomegaly likely related to moderate parenchymal atrophy. However, a 
component of normal pressure hydrocephalus is not excluded by imaging 
appearance. Small foci of chronic microhemorrhageinvolving the cerebellum and 
left temporal lobe. Signed: Po Yen MDReport Verified Date/Time:  
2019 06:09:32     Electronically signed by: PO YEN MD on 
2019 06:09 AMTSH/FREE T4 IF [id number] 19:42:00





             Test Item    Value        Reference Range Interpretation Comments

 

             THYROID STIMULATING HORMONE 1.98 uIU/mL  0.35-4.94                 



             (BEAKER) (test code = 772)                                        



VITAMIN B12 AND [id number] 19:42:00





             Test Item    Value        Reference Range Interpretation Comments

 

             VITAMIN B12 (BEAKER) (test code = 243 pg/mL    213-816             

      



             774)                                                

 

             FOLATE (BEAKER) (test code = 362) 9.3 ng/mL    >=7.0               

      



URINALYSIS W/ REFLEX URINE [id number] 19:07:00





             Test Item    Value        Reference Range Interpretation Comments

 

             COLOR (BEAKER) (test code = 470) Yellow                            

     

 

             CLARITY (BEAKER) (test code = 469) Clear                           

       

 

             SPECIFIC GRAVITY UA (BEAKER) (test 1.016        1.001-1.035        

       



             code = 468)                                         

 

             PH UA (BEAKER) (test code = 467) 6.5          5.0-8.0              

     

 

             PROTEIN UA (BEAKER) (test code = 50 mg/dL     Negative     A       

     



             464)                                                

 

             GLUCOSE UA (BEAKER) (test code = Negative     Negative             

     



             365)                                                

 

             KETONES UA (BEAKER) (test code = Negative     Negative             

     



             371)                                                

 

             BILIRUBIN UA (BEAKER) (test code = Negative     Negative           

       



             462)                                                

 

             BLOOD UA (BEAKER) (test code = 461) Negative     Negative          

        

 

             NITRITE UA (BEAKER) (test code = Negative     Negative             

     



             465)                                                

 

             LEUKOCYTE ESTERASE UA (BEAKER) Negative     Negative               

   



             (test code = 466)                                        

 

             UROBILINOGEN UA (BEAKER) (test code 6.0 mg/dL    0.2-1.0      H    

        



             = 463)                                              

 

             RBC UA (BEAKER) (test code = 519) 0 /HPF                           

      

 

             WBC UA (BEAKER) (test code = 520) 1 /HPF                           

      

 

             SQUAMOUS EPITHELIAL (BEAKER) (test 1 /HPF                          

       



             code = 516)                                         

 

             HYALINE CASTS (BEAKER) (test code = 2 /LPF                         

        



             514)                                                

 

             SOURCE(BEAKER) (test code = 2795)                                  

      



POCT-GLUCOSE [id number] 19:04:00





             Test Item    Value        Reference Range Interpretation Comments

 

             POC-GLUCOSE METER 100 mg/dL                        TESTED AT 

[facility] [address]



             (BEAKER) (test code =                                        [place]



             [address])                                               [address]



[id number] 14:38:00





             Test Item    Value        Reference Range Interpretation Comments

 

             PROTIME (BEAKER) (test code = 13.5 seconds 11.9-14.2               

  



             759)                                                

 

             INR (BEAKER) (test code = 370) 1.1          <=5.9                  

   

 

             PARTIAL THROMBOPLASTIN TIME 24.1 seconds 22.5-36.0                 



             (BEAKER) (test code = 760)                                        



Effective 2019: PT Reference Range ChangeNew: 11.9-14.2  Previous: 11.7-
14.7RECOMMENDED COUMADIN/WARFARIN INR THERAPY RANGESSTANDARD DOSE: 2.0-3.0  
Includes: PROPHYLAXIS for venous thrombosis, systemic embolization; TREATMENT 
for venous thrombosis and/or pulmonary embolus.HIGH RISK: Target INR is2.5-3.5 
for patients wiht mechanical heart valves.CBC W/PLT COUNT &amp; AUTO 
[id number] 14:23:00





             Test Item    Value        Reference Range Interpretation Comments

 

             WHITE BLOOD CELL COUNT (BEAKER) 8.3 K/ L     3.5-10.5              

    



             (test code = 775)                                        

 

             RED BLOOD CELL COUNT (BEAKER) 3.73 M/ L    4.63-6.08    L          

  



             (test code = 761)                                        

 

             HEMOGLOBIN (BEAKER) (test code = 11.3 GM/DL   13.7-17.5    L       

     



             410)                                                

 

             HEMATOCRIT (BEAKER) (test code = 34.5 %       40.1-51.0    L       

     



             411)                                                

 

             MEAN CORPUSCULAR VOLUME (BEAKER) 92.5 fL      79.0-92.2    H       

     



             (test code = 753)                                        

 

             MEAN CORPUSCULAR HEMOGLOBIN 30.3 pg      25.7-32.2                 



             (BEAKER) (test code = 751)                                        

 

             MEAN CORPUSCULAR HEMOGLOBIN CONC 32.8 GM/DL   32.3-36.5            

     



             (BEAKER) (test code = 752)                                        

 

             RED CELL DISTRIBUTION WIDTH 13.6 %       11.6-14.4                 



             (BEAKER) (test code = 412)                                        

 

             PLATELET COUNT (BEAKER) (test 192 K/CU MM  150-450                 

  



             code = 756)                                         

 

             MEAN PLATELET VOLUME (BEAKER) 10.4 fL      9.4-12.4                

  



             (test code = 754)                                        

 

             NUCLEATED RED BLOOD CELLS 0 /100 WBC   0-0                       



             (BEAKER) (test code = 413)                                        

 

             NEUTROPHILS RELATIVE PERCENT 43 %                                  

 



             (BEAKER) (test code = 429)                                        

 

             LYMPHOCYTES RELATIVE PERCENT 46 %                                  

 



             (BEAKER) (test code = 430)                                        

 

             MONOCYTES RELATIVE PERCENT 9 %                                    



             (BEAKER) (test code = 431)                                        

 

             EOSINOPHILS RELATIVE PERCENT 1 %                                   

 



             (BEAKER) (test code = 432)                                        

 

             BASOPHILS RELATIVE PERCENT 1 %                                    



             (BEAKER) (test code = 437)                                        

 

             NEUTROPHILS ABSOLUTE COUNT 3.57 K/ L    1.78-5.38                 



             (BEAKER) (test code = 670)                                        

 

             LYMPHOCYTES ABSOLUTE COUNT 3.84 K/ L    1.32-3.57    H            



             (BEAKER) (test code = 414)                                        

 

             MONOCYTES ABSOLUTE COUNT (BEAKER) 0.73 K/ L    0.30-0.82           

      



             (test code = 415)                                        

 

             EOSINOPHILS ABSOLUTE COUNT 0.10 K/ L    0.04-0.54                 



             (BEAKER) (test code = 416)                                        

 

             BASOPHILS ABSOLUTE COUNT (BEAKER) 0.04 K/ L    0.01-0.08           

      



             (test code = 417)                                        

 

             IMMATURE GRANULOCYTES-RELATIVE 0 %          0-1                    

   



             PERCENT (BEAKER) (test code =                                      

  



             2801)                                               



TROPONIN -[id number] 14:13:00





             Test Item    Value        Reference Range Interpretation Comments

 

             TROPONIN I (BEAKER) (test code = 0.02 ng/mL   0.00-0.03            

     



             397)                                                








             Test Item    Value        Reference Range Interpretation Comments

 

             MAGNESIUM (BEAKER) (test code = 2.0 mg/dL    1.6-2.6               

    



             627)                                                



BASIC METABOLIC [id number] 14:07:00





             Test Item    Value        Reference Range Interpretation Comments

 

             SODIUM (BEAKER) 140 meq/L    136-145                   



             (test code = 381)                                        

 

             POTASSIUM (BEAKER) 4.2 meq/L    3.5-5.1                   



             (test code = 379)                                        

 

             CHLORIDE (BEAKER) 106 meq/L                        



             (test code = 382)                                        

 

             CO2 (BEAKER) (test 29 meq/L     22-29                     



             code = 355)                                         

 

             BLOOD UREA NITROGEN 23 mg/dL     7-21         H            



             (BEAKER) (test code                                        



             = 354)                                              

 

             CREATININE (BEAKER) 1.79 mg/dL   0.57-1.25    H            



             (test code = 358)                                        

 

             GLUCOSE RANDOM 121 mg/dL           H            



             (BEAKER) (test code                                        



             = 652)                                              

 

             CALCIUM (BEAKER) 10.0 mg/dL   8.4-10.2                  



             (test code = 697)                                        

 

             EGFR (BEAKER) (test 44 mL/min/1.73                           ESTIMA

YESSICA GFR IS



             code = 1092) sq m                                   NOT AS ACCURATE

 AS



                                                                 CREATININE



                                                                 CLEARANCE IN



                                                                 PREDICTING



                                                                 GLOMERULAR



                                                                 FILTRATION RATE

.



                                                                 ESTIMATED GFR I

S



                                                                 NOT APPLICABLE 

FOR



                                                                 DIALYSIS PATIEN

TS.



RAD, CHEST, 1 VIEW, NON [id number] 14:01:00Reason for exam:-&gt;strokeFINAL 
REPORT PATIENT ID:   [id number] Clinical History: stroke Comparison Study: None 
Findings:  The heart and lungs are within normal limits.  The pleural spaces are
clear.  There is no pneumothorax. Degenerative changes are seen. Impression: No 
active cardiopulmonary disease. Signed: Jet [place] Verified 
Date/Time:  2019 14:01:41 Reading Location: [facility] 
Reading Room      Electronically signed by: JET NEWTON M.D. on 2019 
02:01 [facility], BRAIN/STROKE [id number] 13:28:00Reason for exam:-&gt;AMSWhat
is the patient's sedation requirement?-&gt;No SedationFINAL REPORT PATIENT ID:  
[id number] CT Head without contrast CLINICAL HISTORY: Decreased alertnessAMS 
TECHNIQUE: Contiguous axial images through the head without contrast. This exam 
was performed according to the departmental dose optimization program which 
includes automated exposure control, adjustment of the mA and/or kV according to
the patient size, and/or use of an iterative reconstruction technique. 
COMPARISON: None FINDINGS: There is no CT evidence of acute infarct. There is no
intracranial hemorrhage. There is nonspecific white matter disease. There is 
generalized parenchymal volume loss with slightly disproportionate 
ventriculomegaly. There is no midline shift. There are atherosclerotic c
alcifications of the intracranial circulation. There are no extra-axial fluid 
collections. The skullis intact. The paranasal sinuses are well-aerated. 
IMPRESSION: There is no CT evidence of acute infarct or intracranial hemorrhage.
Mild ventriculomegaly, for which clinical correlation for communicating 
hydrocephalus is recommended. The findings were discussed with the stroke 
neurologist at 1:30 PM. Signed: Alyssa Verma MDReport Verified Date/Time:  
2019 13:28:27 Reading Location: [facility] C0[facility] Neuro Reading Room      
Electronically signed by: ALYSSA VERMA M.D. on 2019 01:28 PMTISSUE EXAM
2019 17:52:00Surgical Pathology Report                         Case: [id number]
[id number]                                 Authorizing Provider:  Lorenzo Goldman,   Collected:           2019 0829                 MD               
                                                         OrderingLocation:     
[facility]             Received:            2019 0938           
PERIOPERATIVE SERVICES                                                     
Pathologist:        Tank Marino MD                                         
              Specimen:    Plaque, right carotid plaque                         
                               ARTERY, RIGHT CAROTID, ENDARTERECTOMY:CALCIFIC 
ATHEROSCLEROTIC PLAQUE      Signing Pathologist Direct Phone Line: [phone]
[phone] signed by Tank Marino MD on 2019 at  5:52 [id number]; 
[id number] carotid plaque The specimen is received in formalin and consists of a
portion of fibrotic, calcified, vasculartissue measuring 3.5 x 1.1 x 0.5 cm. 
Representative sections are submitted in one cassette for decalcification. 
CB/ewPerformedPOCT-GLUCOSE [id number] 17:51:00





             Test Item    Value        Reference Range Interpretation Comments

 

             POC-GLUCOSE METER 129 mg/dL           H            TESTED AT 

[facility] [address]



             ([employer]) (test code =                                        [place] [employer]



             1538)                                               [address]



POCT-GLUCOSE [id number] 08:26:00





             Test Item    Value        Reference Range Interpretation Comments

 

             POC-GLUCOSE METER 136 mg/dL           H            TESTED AT 

[facility] [address]



             ([employer]) (test code =                                        [place] [employer]



             1538)                                               [address]



[id number] 06:14:00





             Test Item    Value        Reference Range Interpretation Comments

 

             MAGNESIUM ([employer]) (test code = 2.0 mg/dL    1.6-2.6               

    



             627)                                                



BASIC METABOLIC [id number] 06:14:00





             Test Item    Value        Reference Range Interpretation Comments

 

             SODIUM (BEAKER) 132 meq/L    136-145      L            



             (test code = 381)                                        

 

             POTASSIUM (BEAKER) 3.9 meq/L    3.5-5.1                   



             (test code = 379)                                        

 

             CHLORIDE (BEAKER) 99 meq/L                         



             (test code = 382)                                        

 

             CO2 (BEAKER) (test 24 meq/L     22-29                     



             code = 355)                                         

 

             BLOOD UREA NITROGEN 34 mg/dL     7-21         H            



             (BEAKER) (test code                                        



             = 354)                                              

 

             CREATININE (BEAKER) 1.81 mg/dL   0.57-1.25    H            



             (test code = 358)                                        

 

             GLUCOSE RANDOM 138 mg/dL           H            



             (BEAKER) (test code                                        



             = 652)                                              

 

             CALCIUM (BEAKER) 10.1 mg/dL   8.4-10.2                  



             (test code = 697)                                        

 

             EGFR (BEAKER) (test 43 mL/min/1.73                           ESTIMA

YESSICA GFR IS



             code = 1092) sq m                                   NOT AS ACCURATE

 AS



                                                                 CREATININE



                                                                 CLEARANCE IN



                                                                 PREDICTING



                                                                 GLOMERULAR



                                                                 FILTRATION RATE

.



                                                                 ESTIMATED GFR I

S



                                                                 NOT APPLICABLE 

FOR



                                                                 DIALYSIS PATIEN

TS.



CBC (HEMOGRAM ONLY)2019 05:58:00





             Test Item    Value        Reference Range Interpretation Comments

 

             WHITE BLOOD CELL COUNT (BEAKER) 12.8 K/ L    3.5-10.5     H        

    



             (test code = 775)                                        

 

             RED BLOOD CELL COUNT (BEAKER) 3.73 M/ L    4.63-6.08    L          

  



             (test code = 761)                                        

 

             HEMOGLOBIN (BEAKER) (test code = 11.1 GM/DL   13.7-17.5    L       

     



             410)                                                

 

             HEMATOCRIT (BEAKER) (test code = 33.9 %       40.1-51.0    L       

     



             411)                                                

 

             MEAN CORPUSCULAR VOLUME (BEAKER) 90.9 fL      79.0-92.2            

     



             (test code = 753)                                        

 

             MEAN CORPUSCULAR HEMOGLOBIN 29.8 pg      25.7-32.2                 



             (BEAKER) (test code = 751)                                        

 

             MEAN CORPUSCULAR HEMOGLOBIN CONC 32.7 GM/DL   32.3-36.5            

     



             (BEAKER) (test code = 752)                                        

 

             RED CELL DISTRIBUTION WIDTH 14.0 %       11.6-14.4                 



             (BEAKER) (test code = 412)                                        

 

             PLATELET COUNT (BEAKER) (test 160 K/CU MM  150-450                 

  



             code = 756)                                         

 

             MEAN PLATELET VOLUME (BEAKER) 10.3 fL      9.4-12.4                

  



             (test code = 754)                                        

 

             NUCLEATED RED BLOOD CELLS 0 /100 WBC   0-0                       



             (BEAKER) (test code = 413)                                        



POCT-GLUCOSE [id number] 18:44:00





             Test Item    Value        Reference Range Interpretation Comments

 

             POC-GLUCOSE METER 148 mg/dL           H            TESTED AT 

[place] [address]



             (BEAKER) (test code =                                        [place]



             1538)                                               [address]



POCT-GLUCOSE [id number] 14:58:00





             Test Item    Value        Reference Range Interpretation Comments

 

             POC-GLUCOSE METER 138 mg/dL           H            TESTED AT 

[place] [address]



             [facility] (test code =                                        [place]



             1538)                                               [address]



HEMOGLOBIN [id number] 11:52:00





             Test Item    Value        Reference Range Interpretation Comments

 

             HEMOGLOBIN A1C (BEAKER) (test code = 6.4 %        4.3-6.1      H   

         



             368)                                                



POCT-GLUCOSE [id number] 08:45:00





             Test Item    Value        Reference Range Interpretation Comments

 

             POC-GLUCOSE METER 144 mg/dL           H            TESTED AT 

[place] [address]



             (BEAKER) (test code =                                        [place]



             1538)                                               [address]



[id number] 04:50:00





             Test Item    Value        Reference Range Interpretation Comments

 

             PHOSPHORUS (BEAKER) (test code = 5.2 mg/dL    2.3-4.7      H       

     



             604)                                                



[id number] 04:50:00





             Test Item    Value        Reference Range Interpretation Comments

 

             MAGNESIUM (BEAKER) (test code = 2.0 mg/dL    1.6-2.6               

    



             627)                                                



BASIC METABOLIC [id number] 04:50:00





             Test Item    Value        Reference Range Interpretation Comments

 

             SODIUM (BEAKER) 136 meq/L    136-145                   



             (test code = 381)                                        

 

             POTASSIUM (BEAKER) 4.7 meq/L    3.5-5.1                   



             (test code = 379)                                        

 

             CHLORIDE (BEAKER) 104 meq/L                        



             (test code = 382)                                        

 

             CO2 (BEAKER) (test 24 meq/L     22-29                     



             code = 355)                                         

 

             BLOOD UREA NITROGEN 36 mg/dL     7-21         H            



             (BEAKER) (test code                                        



             = 354)                                              

 

             CREATININE (BEAKER) 1.98 mg/dL   0.57-1.25    H            



             (test code = 358)                                        

 

             GLUCOSE RANDOM 145 mg/dL           H            



             (BEAKER) (test code                                        



             = 652)                                              

 

             CALCIUM (BEAKER) 9.5 mg/dL    8.4-10.2                  



             (test code = 697)                                        

 

             EGFR (BEAKER) (test 39 mL/min/1.73                           ESTIMA

YESSICA GFR IS



             code = 1092) sq m                                   NOT AS ACCURATE

 AS



                                                                 CREATININE



                                                                 CLEARANCE IN



                                                                 PREDICTING



                                                                 GLOMERULAR



                                                                 FILTRATION RATE

.



                                                                 ESTIMATED GFR I

S



                                                                 NOT APPLICABLE 

FOR



                                                                 DIALYSIS PATIEN

TS.



CBC (HEMOGRAM ONLY)2019 04:29:00





             Test Item    Value        Reference Range Interpretation Comments

 

             WHITE BLOOD CELL COUNT (BEAKER) 12.4 K/ L    3.5-10.5     H        

    



             (test code = 775)                                        

 

             RED BLOOD CELL COUNT (BEAKER) 3.90 M/ L    4.63-6.08    L          

  



             (test code = 761)                                        

 

             HEMOGLOBIN (BEAKER) (test code = 11.6 GM/DL   13.7-17.5    L       

     



             410)                                                

 

             HEMATOCRIT (BEAKER) (test code = 35.8 %       40.1-51.0    L       

     



             411)                                                

 

             MEAN CORPUSCULAR VOLUME (BEAKER) 91.8 fL      79.0-92.2            

     



             (test code = 753)                                        

 

             MEAN CORPUSCULAR HEMOGLOBIN 29.7 pg      25.7-32.2                 



             (BEAKER) (test code = 751)                                        

 

             MEAN CORPUSCULAR HEMOGLOBIN CONC 32.4 GM/DL   32.3-36.5            

     



             (BEAKER) (test code = 752)                                        

 

             RED CELL DISTRIBUTION WIDTH 14.4 %       11.6-14.4                 



             (BEAKER) (test code = 412)                                        

 

             PLATELET COUNT (BEAKER) (test 187 K/CU MM  150-450                 

  



             code = 756)                                         

 

             MEAN PLATELET VOLUME (BEAKER) 9.6 fL       9.4-12.4                

  



             (test code = 754)                                        

 

             NUCLEATED RED BLOOD CELLS 0 /100 WBC   0-0                       



             (BEAKER) (test code = 413)                                        



BLOOD GAS, [id number] 04:27:00





             Test Item    Value        Reference Range Interpretation Comments

 

             PH ARTERIAL (BEAKER) (test code = 7.36         7.35-7.45           

      



             383)                                                

 

             PCO2 ARTERIAL (BEAKER) (test code 45 mmHg      35-45               

      



             = 384)                                              

 

             PO2 ARTERIAL (BEAKER) (test code 120 mmHg     80-90        H       

     



             = 385)                                              

 

             O2 SATURATION ARTERIAL (BEAKER) 98.3 %       96.0-97.0    H        

    



             (test code = 386)                                        

 

             HCO3 ARTERIAL (BEAKER) (test code 25 mmol/L    21-29               

      



             = 388)                                              

 

             BASE EXCESS ARTERIAL (BEAKER) -0.9 mmol/L  -2.0-3.0                

  



             (test code = 387)                                        

 

             PATIENT TEMPERATURE (BEAKER) 36.6 C                                

 



             (test code = 1818)                                        

 

             FIO2 (BEAKER) (test code = 1819) 28.0 %                            

     



CALCIUM, [id number] 04:26:00





             Test Item    Value        Reference Range Interpretation Comments

 

             CALCIUM IONIZED (BEAKER) (test 1.26 mmol/L  1.12-1.27              

   



             code = 698)                                         

 

             PH, BLOOD (BEAKER) (test code = 7.35                               

    



             1810)                                               



POCT-GLUCOSE [id number] 00:57:00





             Test Item    Value        Reference Range Interpretation Comments

 

             POC-GLUCOSE METER 110 mg/dL                        TESTED AT 

[place] [address]



             [facility] (test code =                                        [facility]

[place] [employer]



             1538)                                               [address]



POCT-GLUCOSE [id number] 18:45:00





             Test Item    Value        Reference Range Interpretation Comments

 

             POC-GLUCOSE METER 122 mg/dL           H            TESTED AT 

[place] [address]



             [facility] (test code =                                        [facility]



             1538)                                               [address]



[id number] 11:33:00





             Test Item    Value        Reference Range Interpretation Comments

 

             PHOSPHORUS (BEAKER) (test code = 3.6 mg/dL    2.3-4.7              

     



             604)                                                



[id number] 11:33:00





             Test Item    Value        Reference Range Interpretation Comments

 

             MAGNESIUM (BEAKER) (test code = 2.0 mg/dL    1.6-2.6               

    



             627)                                                



BASIC METABOLIC [id number] 11:33:00





             Test Item    Value        Reference Range Interpretation Comments

 

             SODIUM (BEAKER) 137 meq/L    136-145                   



             (test code = 381)                                        

 

             POTASSIUM (BEAKER) 4.1 meq/L    3.5-5.1                   



             (test code = 379)                                        

 

             CHLORIDE (BEAKER) 105 meq/L                        



             (test code = 382)                                        

 

             CO2 (BEAKER) (test 26 meq/L     22-29                     



             code = 355)                                         

 

             BLOOD UREA NITROGEN 33 mg/dL     7-21         H            



             (BEAKER) (test code                                        



             = 354)                                              

 

             CREATININE (BEAKER) 1.81 mg/dL   0.57-1.25    H            



             (test code = 358)                                        

 

             GLUCOSE RANDOM 148 mg/dL           H            



             (BEAKER) (test code                                        



             = 652)                                              

 

             CALCIUM (BEAKER) 9.5 mg/dL    8.4-10.2                  



             (test code = 697)                                        

 

             EGFR (BEAKER) (test 43 mL/min/1.73                           ESTIMA

YESSICA GFR IS



             code = 1092) sq m                                   NOT AS ACCURATE

 AS



                                                                 CREATININE



                                                                 CLEARANCE IN



                                                                 PREDICTING



                                                                 GLOMERULAR



                                                                 FILTRATION RATE

.



                                                                 ESTIMATED GFR I

S



                                                                 NOT APPLICABLE 

FOR



                                                                 DIALYSIS PATIEN

TS.



HEPATIC FUNCTION [id number] 11:33:00





             Test Item    Value        Reference Range Interpretation Comments

 

             TOTAL PROTEIN (BEAKER) (test code = 7.7 gm/dL    6.0-8.3           

        



             770)                                                

 

             ALBUMIN (BEAKER) (test code = 1145) 4.1 g/dL     3.5-5.0           

        

 

             BILIRUBIN TOTAL (BEAKER) (test code 1.1 mg/dL    0.2-1.2           

        



             = 377)                                              

 

             BILIRUBIN DIRECT (BEAKER) (test 0.5 mg/dL    0.1-0.5               

    



             code = 706)                                         

 

             ALKALINE PHOSPHATASE (BEAKER) (test 76 U/L                   

        



             code = 346)                                         

 

             AST (SGOT) (BEAKER) (test code = 18 U/L       5-34                 

     



             353)                                                

 

             ALT (SGPT) (BEAKER) (test code = 13 U/L       6-55                 

     



             347)                                                



[id number] 11:03:00





             Test Item    Value        Reference Range Interpretation Comments

 

             PROTIME (BEAKER) (test code = 14.7 seconds 11.9-14.2    H          

  



             759)                                                

 

             INR (BEAKER) (test code = 370) 1.2          <=5.9                  

   

 

             PARTIAL THROMBOPLASTIN TIME 29.7 seconds 22.5-36.0                 



             (BEAKER) (test code = 760)                                        



Effective 2019: PT Reference Range ChangeNew: 11.9-14.2  Previous: 11.7-
14.7RECOMMENDED COUMADIN/WARFARIN INR THERAPY RANGESSTANDARD DOSE: 2.0-3.0  
Includes: PROPHYLAXIS for venous thrombosis, systemic embolization; TREATMENT 
for venous thrombosis and/or pulmonary embolus.HIGH RISK: Target INR is2.5-3.5 
for patients wiht mechanical heart valves.CBC (HEMOGRAM ONLY)2019 10:55:00





             Test Item    Value        Reference Range Interpretation Comments

 

             WHITE BLOOD CELL COUNT (BEAKER) 11.7 K/ L    3.5-10.5     H        

    



             (test code = 775)                                        

 

             RED BLOOD CELL COUNT (BEAKER) 4.01 M/ L    4.63-6.08    L          

  



             (test code = 761)                                        

 

             HEMOGLOBIN (BEAKER) (test code = 12.0 GM/DL   13.7-17.5    L       

     



             410)                                                

 

             HEMATOCRIT (BEAKER) (test code = 36.6 %       40.1-51.0    L       

     



             411)                                                

 

             MEAN CORPUSCULAR VOLUME (BEAKER) 91.3 fL      79.0-92.2            

     



             (test code = 753)                                        

 

             MEAN CORPUSCULAR HEMOGLOBIN 29.9 pg      25.7-32.2                 



             (BEAKER) (test code = 751)                                        

 

             MEAN CORPUSCULAR HEMOGLOBIN CONC 32.8 GM/DL   32.3-36.5            

     



             (BEAKER) (test code = 752)                                        

 

             RED CELL DISTRIBUTION WIDTH 13.9 %       11.6-14.4                 



             (BEAKER) (test code = 412)                                        

 

             PLATELET COUNT (BEAKER) (test 193 K/CU MM  150-450                 

  



             code = 756)                                         

 

             MEAN PLATELET VOLUME (BEAKER) 9.5 fL       9.4-12.4                

  



             (test code = 754)                                        

 

             NUCLEATED RED BLOOD CELLS 0 /100 WBC   0-0                       



             (BEAKER) (test code = 413)                                        



BLOOD GAS, [id number] 10:50:00





             Test Item    Value        Reference Range Interpretation Comments

 

             PH ARTERIAL (BEAKER) (test code = 7.37         7.35-7.45           

      



             383)                                                

 

             PCO2 ARTERIAL (BEAKER) (test code 42 mmHg      35-45               

      



             = 384)                                              

 

             PO2 ARTERIAL (BEAKER) (test code 106 mmHg     80-90        H       

     



             = 385)                                              

 

             O2 SATURATION ARTERIAL (BEAKER) 97.8 %       96.0-97.0    H        

    



             (test code = 386)                                        

 

             HCO3 ARTERIAL (BEAKER) (test code 24 mmol/L    21-29               

      



             = 388)                                              

 

             BASE EXCESS ARTERIAL (BEAKER) -1.7 mmol/L  -2.0-3.0                

  



             (test code = 387)                                        

 

             PATIENT TEMPERATURE (BEAKER) 36.5 C                                

 



             (test code = 1818)                                        

 

             FIO2 (BEAKER) (test code = 1819) 28.0 %                            

     



GLUCOSE-STAT [id number] 10:50:00





             Test Item    Value        Reference Range Interpretation Comments

 

             GLUCOSE RANDOM (BEAKER) (test code 146 mg/dL           H     

       



             = 652)                                              



HGB/HCT (H&amp;H) - STAT [id number] 10:50:00





             Test Item    Value        Reference Range Interpretation Comments

 

             HEMOGLOBIN (BEAKER) (test code = 12.6 g/dL    13.0-16.8    L       

     



             410)                                                

 

             HEMATOCRIT (BEAKER) (test code = 37.0 %       40.0-50.0    L       

     



             411)                                                



CALCIUM, [id number] 10:50:00





             Test Item    Value        Reference Range Interpretation Comments

 

             CALCIUM IONIZED (BEAKER) (test 1.23 mmol/L  1.12-1.27              

   



             code = 698)                                         

 

             PH, BLOOD (BEAKER) (test code = 7.36                               

    



             1810)                                               



SODIUM NA-STAT [id number] 10:49:00





             Test Item    Value        Reference Range Interpretation Comments

 

             SODIUM (BEAKER) (test code = 381) 138 meq/L    135-148             

      



POTASSIUM-STAT [id number] 10:49:00





             Test Item    Value        Reference Range Interpretation Comments

 

             POTASSIUM (BEAKER) (test code = 4.2 meq/L    3.6-5.5               

    



             379)                                                



POCT-GLUCOSE [id number] 06:30:00





             Test Item    Value        Reference Range Interpretation Comments

 

             POC-GLUCOSE METER 125 mg/dL           H            TESTED AT 

[facility] [address]



             (BEAKER) (test code =                                        [place]



             [address]38)                                               [address]



CBC W/PLT COUNT &amp; AUTO [id number] 15:40:00





             Test Item    Value        Reference Range Interpretation Comments

 

             WHITE BLOOD CELL COUNT (BEAKER) 10.6 K/ L    3.5-10.5     H        

    



             (test code = 775)                                        

 

             RED BLOOD CELL COUNT (BEAKER) 4.18 M/ L    4.63-6.08    L          

  



             (test code = 761)                                        

 

             HEMOGLOBIN (BEAKER) (test code = 12.6 GM/DL   13.7-17.5    L       

     



             410)                                                

 

             HEMATOCRIT (BEAKER) (test code = 38.3 %       40.1-51.0    L       

     



             411)                                                

 

             MEAN CORPUSCULAR VOLUME (BEAKER) 91.6 fL      79.0-92.2            

     



             (test code = 753)                                        

 

             MEAN CORPUSCULAR HEMOGLOBIN 30.1 pg      25.7-32.2                 



             (BEAKER) (test code = 751)                                        

 

             MEAN CORPUSCULAR HEMOGLOBIN CONC 32.9 GM/DL   32.3-36.5            

     



             (BEAKER) (test code = 752)                                        

 

             RED CELL DISTRIBUTION WIDTH 14.0 %       11.6-14.4                 



             (BEAKER) (test code = 412)                                        

 

             PLATELET COUNT (BEAKER) (test 214 K/CU MM  150-450                 

  



             code = 756)                                         

 

             MEAN PLATELET VOLUME (BEAKER) 9.2 fL       9.4-12.4     L          

  



             (test code = 754)                                        

 

             NUCLEATED RED BLOOD CELLS 0 /100 WBC   0-0                       



             (BEAKER) (test code = 413)                                        



(CELLAVISION MANUAL DIFF)2019 15:40:00





             Test Item    Value        Reference Range Interpretation Comments

 

             NEUTROPHILS - REL 30 %                                   



             (CELLAVISION)(BEAKER) (test code                                   

     



             = 2816)                                             

 

             LYMPHOCYTES - REL 58 %                                   



             (CELLAVISION)(BEAKER) (test code                                   

     



             = 2817)                                             

 

             MONOCYTES - REL 4 %                                    



             (CELLAVISION)(BEAKER) (test code                                   

     



             = 2818)                                             

 

             EOSINOPHILS - REL 1 %                                    



             (CELLAVISION)(BEAKER) (test code                                   

     



             = 2819)                                             

 

             BASOPHILS - REL 2 %                                    



             (CELLAVISION)(BEAKER) (test code                                   

     



             = 2820)                                             

 

             ATYPICAL LYMPHOCYTES - REL 5 %          0-0          H            



             (CELLAVISION)(BEAKER) (test code                                   

     



             = 2829)                                             

 

             NEUTROPHILS - ABS 3.18 K/ul    1.78-5.38                 



             (CELLAVISION)(BEAKER) (test code                                   

     



             = 2830)                                             

 

             LYMPHOCYTES - ABS 6.15 K/ul    1.32-3.57    H            



             (CELLAVISION)(BEAKER) (test code                                   

     



             = 2831)                                             

 

             MONOCYTES - ABS 0.42 K/uL    0.30-0.82                 



             (CELLAVISION)(BEAKER) (test code                                   

     



             = 2832)                                             

 

             EOSINOPHILS - ABS 0.11 K/uL    0.04-0.54                 



             (CELLAVISION)(BEAKER) (test code                                   

     



             = 2834)                                             

 

             BASOPHILS - ABS 0.21 K/uL    0.01-0.08    H            



             (CELLAVISION)(BEAKER) (test code                                   

     



             = 2835)                                             

 

             ATYPICAL LYMPHOCYTES - ABS 0.53 K/uL    0.00-0.00    H            



             (CELLAVISION)(BEAKER) (test code                                   

     



             = 2858)                                             

 

             TOTAL COUNTED (BEAKER) (test code 100                              

      



             = 1351)                                             

 

             PLT MORPHOLOGY (BEAKER) (test Normal                               

  



             code = 486)                                         

 

             SMUDGE CELLS (BEAKER) (test code Present                           

     



             = 1371)                                             

 

             POLYCHROMATOPHILLIC RBCS(BEAKER) 3+ many                           

     



             (test code = 478)                                        

 

             ANISOCYTOSIS (BEAKER) (test code 1+ few                            

     



             = 961)                                              

 

             MICROCYTES (BEAKER) (test code = 1+ few                            

     



             965)                                                

 

             POIKILOCYTES (BEAKER) (test code 2+ moderate                       

     



             = 966)                                              

 

             PLATELET CONCENTRATION Adequate                               



             (CELLAVISION)(BEAKER) (test code                                   

     



             = 3438)                                             



Received comment: User comments: Slide comments:BASIC METABOLIC [id number] 
13:49:00





             Test Item    Value        Reference Range Interpretation Comments

 

             SODIUM (BEAKER) 138 meq/L    136-145                   



             (test code = 381)                                        

 

             POTASSIUM (BEAKER) 4.8 meq/L    3.5-5.1                   



             (test code = 379)                                        

 

             CHLORIDE (BEAKER) 104 meq/L                        



             (test code = 382)                                        

 

             CO2 (BEAKER) (test 27 meq/L     22-29                     



             code = 355)                                         

 

             BLOOD UREA NITROGEN 36 mg/dL     7-21         H            



             (BEAKER) (test code                                        



             = 354)                                              

 

             CREATININE (BEAKER) 1.96 mg/dL   0.57-1.25    H            



             (test code = 358)                                        

 

             GLUCOSE RANDOM 78 mg/dL                         



             (BEAKER) (test code                                        



             = 652)                                              

 

             CALCIUM (BEAKER) 9.9 mg/dL    8.4-10.2                  



             (test code = 697)                                        

 

             EGFR (BEAKER) (test 40 mL/min/1.73                           ESTIMA

YESSICA GFR IS



             code = 1092) sq m                                   NOT AS ACCURATE

 AS



                                                                 CREATININE



                                                                 CLEARANCE IN



                                                                 PREDICTING



                                                                 GLOMERULAR



                                                                 FILTRATION RATE

.



                                                                 ESTIMATED GFR I

S



                                                                 NOT APPLICABLE 

FOR



                                                                 DIALYSIS PATIEN

TS.



Specimen slightly [id number] 13:46:00





             Test Item    Value        Reference Range Interpretation Comments

 

             PARTIAL THROMBOPLASTIN TIME 27.1 seconds 22.5-36.0                 



             (BEAKER) (test code = 760)                                        



PROTHROMBIN [id number] 13:45:00





             Test Item    Value        Reference Range Interpretation Comments

 

             PROTIME (BEAKER) (test code = 13.4 seconds 11.9-14.2               

  



             759)                                                

 

             INR (BEAKER) (test code = 370) 1.1          <=5.9                  

   



Effective 2019: PT Reference Range ChangeNew: 11.9-14.2  Previous: 11.7-
14.7RECOMMENDED COUMADIN/WARFARIN INR THERAPY RANGESSTANDARD DOSE: 2.0-3.0  
Includes: PROPHYLAXIS for venous thrombosis, systemic embolization; TREATMENT 
for venous thrombosis and/or pulmonary embolus.HIGH RISK: Target INR is2.5-3.5 
for patients wiht mechanical heart valves.

## 2021-01-20 NOTE — XMS REPORT
Clinical Summary

                           Created on:2021



Patient:Rishi Joseph

Sex:Male

:1933

External Reference #:[id number]





Demographics







                          Address                   [address] 



                                                    [place] [address]

 

                          Home Phone                [phone]

 

                          Mobile Phone              [phone]

 

                          Email Address             [email or web address]

 

                          Preferred Language        English

 

                          Marital Status            Unknown

 

                          Confucianism Affiliation     Unknown

 

                          Race                      Black or 

 

                          Ethnic Group              Not  or 









Author







                          Organization              [facility]

 

                          Address                   [address]



                                                    [place] [address]









Support







                Name            Relationship    Address         Phone

 

                Will Joseph  Unavailable     Unavailable     [phone]

 

                Yaritza Pethaway  Unavailable     Unavailable     [phone]









Care Team Providers







                    Name                Role                Phone

 

                    Abdullahi Rodrigues MD  Primary Care Provider [phone]

 

                    Sonido Myers            Unavailable









Allergies

No Known Allergies



Medications







          Medication Sig       Dispensed Refills   Start Date End Date  Status

 

          amLODIPine Take 10 mg by mouth           0         2017         

  Active



          (NORVASC) 10 MG daily.                                            



          tablet                                                      

 

          carvedilol (COREG) Take 12.5 mg by           1         2019     

      Active



          12.5 MG tablet mouth 2 (two) times                                    

     



                    daily.                                            

 

          lactulose TK 30 ML PO BID PRF           0         02/15/2017          

 Active



          (GENERLAC) 10 CONSTIPATION                                         



          gram/15 mL                                                   



          solution                                                    

 

          lovastatin Take 20 mg by mouth           0         2017         

  Active



          (MEVACOR) 20 MG every evening.                                        

 



          tablet                                                      

 

          tamsulosin Take 0.4 mg by           3         2019           Act

yuliya



          (FLOMAX) 0.4 mg mouth 2 (two) times                                   

      



          Cap 24 hr capsule daily.                                            

 

          glipiZIDE Take 1 tablet (5 mg 30 tablet 1         10/02/2019          

 Active



          (GLUCOTROL) 5 MG total) by mouth                                      

   



          tablet    daily.                                            

 

          famotidine Take 1 tablet (20 30 tablet 1         10/02/2019           

Active



          (PEPCID) 20 MG mg total) by mouth                                     

    



          tablet    daily.                                            

 

          aspirin 81 MG EC Take 1 tablet (81 90 tablet 3         2019 



          tablet    mg total) by mouth                                         



                    daily.                                            

 

          clopidogrel Take 1 tablet (75 90 tablet 3         2019

0 



          (PLAVIX) 75 mg mg total) by mouth                                     

    



          tablet    daily.                                            

 

          losartan (COZAAR) Take 1 tablet (25 30 tablet 1         10/03/2019 10/

2020 



          25 MG tablet mg total) by mouth                                       

  



                    daily.                                            







Active Problems







                          Problem                   Noted Date

 

                          Altered mental status     2019

 

                          Syncope, unspecified syncope type 2019

 

                          Carotid artery stenosis   2019

 

                          Carotid stenosis          2019

 

                          S/P carotid endarterectomy (Jones ) 2019

 

                          Respiratory insufficiency 2019

 

                          Hypertensive urgency      2019

 

                          Diabetes mellitus         2019

 

                          GERD (gastroesophageal reflux disease) 2019

 

                          Hyperlipidemia            2019

 

                          Hypertension              2019

 

                          Carotid artery occlusion  







Family History







                Medical History Relation        Name            Comments

 

                Heart disease   Brother                         

 

                Heart disease   Father                          

 

                Hyperlipidemia  Father                          

 

                Hypertension    Father                          

 

                Heart disease   Mother                          

 

                Hyperlipidemia  Mother                          

 

                Hypertension    Mother                          

 

                Heart disease   Sister                          









                Relation        Name            Status          Comments

 

                Brother                                 

 

                Father                                  

 

                Mother                                  

 

                Sister                                  







Social History







             Tobacco Use  Types        Packs/Day    Years Used   Date

 

             Never Smoker                                        









                Smokeless Tobacco: Never Used                                 









                Alcohol Use     Drinks/Week     oz/Week         Comments

 

                No                                              









                    Alcohol Habits      Answer              Date Recorded

 

                    How often do you have a drink containing alcohol? Never     

          2019

 

                    How many drinks containing alcohol do you have on a typical 

Not asked           



                    day when you are drinking?                     

 

                    How often do you have six or more drinks on one occasion? No

t asked           









                          Sex Assigned at Birth     Date Recorded

 

                          Not on file               







Last Filed Vital Signs

Not on file



Plan of Treatment







                Health Maintenance Due Date        Last Done       Comments

 

                DIABETIC EYE EXAM 10/03/1943                      

 

                DIABETIC FOOT EXAM 10/03/1943                      

 

                PNEUMOCOCCAL 65+ YRS (1 of 1 - [id number]) 10/03/1998 

                     

 

                MEDICARE ANNUAL WELLNESS (YEAR 2 or FIRST YEAR if no 2020 

                     



                IPPE)                                           

 

                HEMOGLOBIN A1C  2020      

 

                URINE MICROALBUMIN 2020      

 

                INFLUENZA VACCINE (#1) 2020      







Implants







          Implanted Type      Area       Device    Shelf     Model /



                                                  Identifier Expiration Serial /

 Lot



                                                            Date      

 

                Grft Hemshld Dbl Jorge 0.3x3.0in [id number] - [id number]

NTS        Right:          

GETINGE                                 2024          [id number] /



                                        Implanted: Qty: 1 on 2019 by Lorenzo Akbar MD at [facility]            Neck       IND:KEONT:LINDA                       

[id number] /



                                                                      19B27







Results

Not on fileafter 2020



Insurance







          Payer     Benefit Plan / Subscriber ID Effective Dates Phone     Addre

ss   Type



                    Group                                             

 

          [employer] -   [employer] [id number]  2019-[place] [phone]

   



           MEDICARE MGD HMO POS PPO            t          2          [id number]



                                        



          CARE                                              [place] 



                                                            [id number] 









           Guarantor Name Account Type Relation to Date of Birth Phone      Bill

ing



                                 Patient                          Address

 

           Joseph,Rishi Personal/Family Self       1933 [phone]

[address]







                                                       (Home)     [place]



                                                                  [address]







Advance Directives

For more information, please contact: [phone]





                Code Status     Date Activated  Date Inactivated Comments

 

                Full Code       2019  5:50 AM 2019  5:06 PM 









                    This code status was determined by: Patient

## 2021-01-20 NOTE — RAD REPORT
EXAM DESCRIPTION:  CT - Head Brain Wo Cont - 1/20/2021 4:45 pm

 

CLINICAL HISTORY:  CONFUSED

Headache, drowsiness

 

COMPARISON:  Chest Single View dated 7/30/2019

 

TECHNIQUE:  All CT scans are performed using dose optimization technique as appropriate and may inclu
de automated exposure control or mA/KV adjustment according to patient size.

 

FINDINGS:  The exam is degraded by patient motion artifact. There is significant brain atrophy and vo
lume loss.Moderate periventricular and deep white matter chronic microvascular ischemic changes.No gr
oss midline shift.

 

There is increased density along the falx noted which probably is related to motion artifact. No defi
nitive bleed seen. Particular system is prominent likely related to atrophy changes.

 

The paranasal sinuses and mastoids are clear. The calvarium is intact.

 

IMPRESSION:  Examination is limited by motion artifact. Within this limitation no gross acute abnorma
lity is suspected.  However, if patient symptomology persists or progresses repeat CT head or MRI bra
in would be recommended.

## 2021-01-20 NOTE — ER
Nurse's Notes                                                                                     

 [facility]                                                                 

Name: Rishi Joseph                                                                                

Age: 87 yrs                                                                                       

Sex: Male                                                                                         

: 1933                                                                                   

MRN: [id number]                                                                                   

Arrival Date: 2021                                                                          

Time: 16:16                                                                                       

Account#: [id number]                                                                            

Bed 19                                                                                            

Private MD:                                                                                       

Diagnosis: Dehydration;Fever of other and unknown origin;Weakness                                 

                                                                                                  

Presentation:                                                                                     

                                                                                             

16:16 Chief complaint: EMS states: called out for AMS, pt A\T\Ox2, , on scene BP 94/52,  em

      no symptoms of covid or fever. Coronavirus screen: Client denies travel out of the [place]     

      in the last 14 days. Ebola Screen: Patient negative for fever greater than or equal to      

      101.5 degrees Fahrenheit, and additional compatible Ebola Virus Disease symptoms            

      Patient denies exposure to infectious person. Patient denies travel to an                   

      Ebola-affected area in the 21 days before illness onset. No symptoms or risks               

      identified at this time. Initial Sepsis Screen: Does the patient meet any 2 criteria?       

      No. Patient's initial sepsis screen is negative. Does the patient have a suspected          

      source of infection? No. Patient's initial sepsis screen is negative. Risk Assessment:      

      Do you want to hurt yourself or someone else? Patient reports no desire to harm self or     

      others. Onset of symptoms was 2021.                                             

16:16 Method Of Arrival: EMS: [facility]                                                 em  

16:16 Acuity: NEREYDA 3                                                                           em  

                                                                                                  

Historical:                                                                                       

- Allergies:                                                                                      

16:19 No Known Allergies;                                                                     em  

- PMHx:                                                                                           

16:19 BPH; Diabetes - NIDDM; GERD; High Cholesterol; Hypertension;                            em  

19:53 Dementia;                                                                               rr5 

- PSHx:                                                                                           

16:19 Knee surgery;                                                                           em  

                                                                                                  

- Immunization history:: Adult Immunizations unknown.                                             

- Social history:: Smoking status: unknown.                                                       

- Family history:: not pertinent.                                                                 

- Hospitalizations: : No recent hospitalization is reported.                                      

                                                                                                  

                                                                                                  

Screenin:19 Abuse screen: Denies threats or abuse. Nutritional screening: No deficits noted.        em  

      Tuberculosis screening: No symptoms or risk factors identified. Fall Risk None              

      identified.                                                                                 

                                                                                                  

Assessment:                                                                                       

16:16 General: Appears in no apparent distress. comfortable, Behavior is calm, cooperative,   em  

      appropriate for age. Pain: Denies pain. Neuro: Level of Consciousness is awake, alert,      

      obeys commands, Oriented to person, place. Cardiovascular: Capillary refill < 3 seconds     

      Patient's skin is warm and dry. Respiratory: Airway is patent Respiratory effort is         

      even, unlabored, Respiratory pattern is regular, symmetrical. Derm: Skin is intact, is      

      fragile, is thin, Skin is pink, warm \T\ dry. Musculoskeletal: Capillary refill < 3         

      seconds, Range of motion: intact in all extremities.                                        

17:43 Reassessment: Patient appears in no apparent distress at this time. Patient is          em  

      alert/active/playful, equal unlabored respirations, skin warm/dry/pink.                     

18:20 Reassessment: pt unable to void, attempted once in urinal, provider notified, received  em  

      VO for straight cath. x 1.                                                                  

19:40 General: Appears in no apparent distress. comfortable, Behavior is calm, cooperative.   rr5 

      Neuro: Level of Consciousness is awake, alert, confused, Oriented to person.                

      Cardiovascular: Capillary refill < 3 seconds Patient's skin is warm and dry.                

      Respiratory: Airway is patent Respiratory effort is even, unlabored, Respiratory            

      pattern is regular, symmetrical. GI: No signs and/or symptoms were reported involving       

      the gastrointestinal system. : No signs and/or symptoms were reported regarding the       

      genitourinary system. EENT: No signs and/or symptoms were reported regarding the EENT       

      system. Derm: Skin is intact, Skin temperature is warm. Musculoskeletal: Capillary          

      refill < 3 seconds.                                                                         

19:51 Reassessment: [id number] daughter.                                                      5 

19:51 Reassessment: Patient appears in no apparent distress at this time. Patient and/or      rr5 

      family updated on plan of care and expected duration. Pain level reassessed. daughter       

      updated on the phone.                                                                       

20:30 Reassessment: Patient appears in no apparent distress at this time. No changes from     5 

      previously documented assessment.                                                           

21:10 Reassessment: Patient appears in no apparent distress at this time. awaiting for room   rr5 

      assignment, calm, obeys command, not getting out of bed.                                    

21:46 Reassessment: Patient appears in no apparent distress at this time. for transfer to     [facility] [place] 

      room 401. awake laertvital signs taken and recorded.                                        

                                                                                                  

Vital Signs:                                                                                      

16:16  / 64; Pulse 67; Resp 14; Temp 99.6; Pulse Ox 100% ; Pain 0/10;                   em  

17:30  / 68; Pulse 68; Resp 16; Pulse Ox 100% on R/A;                                   em  

18:52  / 74; Pulse 66; Resp 18; Pulse Ox 99% on R/A; Pain 0/10;                         em  

19:41  / 79; Pulse 60; Resp 16; Temp 98; Pulse Ox 98% ;                                 rr5 

20:30  / 62; Pulse 62; Resp 17; Pulse Ox 98% ;                                          rr5 

21:30  / 74; Pulse 65; Resp 19; Temp 98.2; Pulse Ox 99% ;                               rr5 

                                                                                                  

[place] Coma Score:                                                                               

19:41 Eye Response: spontaneous(4). Verbal Response: confused(4). Motor Response: obeys       rr5 

      commands(6). Total: 14.                                                                     

20:30 Eye Response: spontaneous(4). Verbal Response: confused(4). Motor Response: obeys       rr5 

      commands(6). Total: 14.                                                                     

21:30 Eye Response: spontaneous(4). Verbal Response: confused(4). Motor Response: obeys       rr5 

      commands(6). Total: 14.                                                                     

                                                                                                  

ED Course:                                                                                        

16:16 Patient arrived in ED.                                                                  em  

16:16 Maintain EMS IV. Dressing intact. Good blood return noted. Site clean \T\ dry. Gauge \T\    em

      site: 20 RAC.                                                                               

16:18 Triage completed.                                                                       em  

16:18 Damien Pleitez MD is Attending Physician.                                                rn  

16:19 Arm band placed on.                                                                     em  

16:19 Patient has correct armband on for positive identification. Placed in gown. Bed in low  em  

      position. Call light in reach. Side rails up X2. Cardiac monitor on. Pulse ox on. NIBP      

      on.                                                                                         

16:28 Neal Awan, RN is Primary Nurse.                                                      em  

16:29 Warm blanket given. Verbal reassurance given.                                           jp3 

16:29 Patient maintains SpO2 saturation greater than 95% on room air.                         jp3 

16:46 CT Head Brain wo Cont In Process Unspecified.                                           EDMS

17:09 XRAY Chest (1 view) In Process Unspecified.                                             EDMS

18:30 Straight cath inserted, using sterile technique, 18 Fr. Specimen obtained. Returned     em  

      clear yellow urine. Patient tolerated well.                                                 

18:35 Urine collected: clean catch specimen, clear.                                           em  

18:55 Abdullahi Rodrigues MD is Hospitalizing Provider.                                          rn  

21:30 No provider procedures requiring assistance completed. Patient admitted, IV remains in  rr5 

      place. intact, No redness/swelling at site.                                                 

                                                                                                  

Administered Medications:                                                                         

17:07 Drug: NS 0.9% 500 ml Route: IV; Rate: bolus; Site: right antecubital;                   em  

17:47 Follow up: IV Status: Completed infusion; IV Intake: 500ml                              em  

19:28 Drug: Rocephin 1 grams Route: IV; Rate: calculated rate; Site: right antecubital;       rr5 

20:00 Follow up: Response: No adverse reaction; IV Status: Completed infusion; IV Intake: [id number] 

                                                                                                  

                                                                                                  

Intake:                                                                                           

17:47 IV: 500ml; Total: 500ml.                                                                em  

20:00 IV: 10ml; Total: 510ml.                                                                 rr5 

                                                                                                  

Outcome:                                                                                          

18:56 Decision to Hospitalize by Provider.                                                    rn  

21:30 Admitted to Tele accompanied by tech, via stretcher, room 401, with chart, Report       rr5 

      called to  aleena                                                                              

21:30 Condition: stable                                                                           

21:30 Instructed on the need for admit.                                                           

22:24 Patient left the ED.                                                                    rr5 

                                                                                                  

Signatures:                                                                                       

Dispatcher [employer]                           Neal Talamantes RN                        RN                                                      

Damien Pleitez MD MD rn Pisarski, Jacob                              3                                                  

Kelvin Suárez RN                      RN   rr5                                                  

                                                                                                  

Corrections: (The following items were deleted from the chart)                                    

21:44 21:30 Admitted to Tele accompanied by tech, via stretcher, room 401, rr5                rr5 

                                                                                                  

**************************************************************************************************

## 2021-01-21 LAB
BUN BLD-MCNC: 45 MG/DL (ref 7–18)
CRP SERPL-MCNC: 99.3 MG/L (ref ?–3)
FERRITIN SERPL-MCNC: 208.8 NG/ML (ref 26–388)
GLUCOSE SERPLBLD-MCNC: 117 MG/DL (ref 74–106)
HCT VFR BLD CALC: 36.4 % (ref 39.6–49)
LYMPHOCYTES # SPEC AUTO: 2.1 K/UL (ref 0.7–4.9)
PMV BLD: 8.7 FL (ref 7.6–11.3)
POTASSIUM SERPL-SCNC: 4.2 MMOL/L (ref 3.5–5.1)
RBC # BLD: 4.13 M/UL (ref 4.33–5.43)

## 2021-01-21 RX ADMIN — SODIUM CHLORIDE SCH MLS: 0.9 INJECTION, SOLUTION INTRAVENOUS at 13:36

## 2021-01-21 RX ADMIN — QUETIAPINE SCH MG: 25 TABLET ORAL at 22:21

## 2021-01-21 RX ADMIN — TAMSULOSIN HYDROCHLORIDE SCH MG: 0.4 CAPSULE ORAL at 22:21

## 2021-01-21 RX ADMIN — Medication SCH: at 07:53

## 2021-01-21 RX ADMIN — SODIUM CHLORIDE SCH MLS: 0.9 INJECTION, SOLUTION INTRAVENOUS at 04:24

## 2021-01-21 RX ADMIN — Medication SCH: at 21:00

## 2021-01-21 RX ADMIN — CEFTRIAXONE SCH MLS: 1 INJECTION, SOLUTION INTRAVENOUS at 16:04

## 2021-01-21 NOTE — P.HP
Certification for Inpatient


Patient admitted to: Inpatient


With expected LOS: >2 Midnights


Practitioner: I am a practitioner with admitting privileges, knowledge of 

patient current condition, hospital course, and medical plan of care.


Services: Services provided to patient in accordance with Admission requirements

found in Title 42 Section 412.3 of the Code of Federal Regulations





Patient History


Date of Service: 01/21/21


Reason for admission: Altered mental status, weakness, UTI


History of Present Illness: 


86yo M, PMH:  HTN, DM 2, GERD, dementia was brought into the ED due to altered 

mental status and weakness.  Family reports the patient has not been acting 

himself lately, was noted to be hypotensive at home 90s/60s.  They report he has

been complaining of a headache behind his right eye intermittently over the past

few days.  Family were concerned so they brought the patient in.  Workup in ED 

notable for elevated creatinine at 2.27, elevated pro calcitonin 1.29, UA 

suggestive of urinary tract infection, COVID +.  No leukocytosis 


Patient is alert and oriented himself only, unable to provide any history.  

History obtained from patient's daughter.  Daughter reports that the patient has

some dementia but mostly has a " sharp mind".  Spoke with patient's PCP, reports

patient's cognitive function and it has been slowly declining over the past year

or so as well.





Allergies





No Known Allergies Allergy (Verified 01/20/21 22:36)


   





Home Medications: 








Amlodipine [Norvasc] 10 mg PO DAILY 01/21/21 


Carvedilol [Coreg] 12.5 mg PO DAILY 01/21/21 


Chlorthalidone [Hygroton 25mg Tab] 25 mg PO DAILY 01/21/21 


Clopidogrel Bisulfate [Plavix] 75 mg PO DAILY 01/21/21 


Glipizide [Glipizide ER] 5 mg PO BID 01/21/21 


Lovastatin 20 mg PO DAILY 01/21/21 


Quetiapine Fumarate [Seroquel] 25 mg PO BID 01/21/21 


Tamsulosin [Flomax] 0.4 mg PO BEDTIME 01/21/21 








- Past Medical/Surgical History


Has patient received pneumonia vaccine in the past: No


Diabetic: Yes


-: HTN


-: DM Type 2


-: GERD


-: HYPERLIPIDEMIA


-: BPH


-: KIDNEY PROBLEMS


-: dementia


-: KNEE JARRETT


-: APPENDECTOMY





- Family History


  ** Brother


-: Heart disease, Hypertension





  ** Sister


-: Heart disease, Hypertension





- Social History


Smoking Status: Never smoker


Alcohol use: No


CD- Drugs: No


Caffeine use: No


Place of Residence: Home





Review of Systems


10-point ROS is otherwise unremarkable





Physical Examination





- Vital Signs


Temperature: 100.2 F


Blood Pressure: 150/63


Pulse: 68


Respirations: 20


Pulse Ox (%): 95





- Physical Exam


General: Alert, In no apparent distress, Oriented x1, Other (pleasantly 

confused)


HEENT: Normocephalic, PERRLA


Neck: Supple


Respiratory: Clear to auscultation bilaterally, Normal air movement


Cardiovascular: No edema, Regular rate/rhythm


Gastrointestinal: Soft and benign, Non-distended, No tenderness


Musculoskeletal: No erythema, No tenderness


Integumentary: No rashes, No significant lesion


Neurological: Normal speech, Normal strength at 5/5 x4 extr, Cranial nerves 3-12

 intact, Normal affect





Assessment and Plan





- Advance Directives


Does patient have a Living Will: No


Does patient have a Durable POA for Healthcare: No


Physician Review Additional Text: 





Metabolic encephalopathy secondary to UTI


Generalized weakness.


Hypertension


DM 2


GERD


BPH


Dementia 





metabolic encephalopathy likely secondary to UTI, pt with fever at home, 

elevated procalcitonin


does not meet sepsis criteria


continue rocephin


PT/OT consulted


CT Brain - negative for acute process, Notable for significant brain atrophy and

 volume loss.  Moderate periventricular and deep white matter chronic 

microvascular ischemic changes. 


Patient likely no reserve given his chronic microvascular changes, at risk of 

metabolic encephalopathy


Patient without any nuchal rigidity, no current headache at this time, no 

photophobia, and do not suspect meningitis at this time


f/u urine culture





VTE: lovenox


Dispo:  Anticipate dc home in ~48hrs, pending improvement of mentation, if not, 

may need SNF





Time Spent Managing Pts Care (In Minutes): 60

## 2021-01-22 LAB
ALBUMIN SERPL BCP-MCNC: 3.3 G/DL (ref 3.4–5)
ALP SERPL-CCNC: 76 U/L (ref 45–117)
ALT SERPL W P-5'-P-CCNC: 26 U/L (ref 12–78)
AST SERPL W P-5'-P-CCNC: 69 U/L (ref 15–37)
BUN BLD-MCNC: 39 MG/DL (ref 7–18)
CRP SERPL-MCNC: 102 MG/L (ref ?–3)
FERRITIN SERPL-MCNC: 273.6 NG/ML (ref 26–388)
GLUCOSE SERPLBLD-MCNC: 119 MG/DL (ref 74–106)
HCT VFR BLD CALC: 36.4 % (ref 39.6–49)
LYMPHOCYTES # SPEC AUTO: 2.9 K/UL (ref 0.7–4.9)
MAGNESIUM SERPL-MCNC: 2.6 MG/DL (ref 1.8–2.4)
PMV BLD: 8.8 FL (ref 7.6–11.3)
POTASSIUM SERPL-SCNC: 4 MMOL/L (ref 3.5–5.1)
RBC # BLD: 4.1 M/UL (ref 4.33–5.43)

## 2021-01-22 RX ADMIN — ACETAMINOPHEN PRN MG: 500 TABLET, FILM COATED ORAL at 17:20

## 2021-01-22 RX ADMIN — CLOPIDOGREL BISULFATE SCH MG: 75 TABLET, FILM COATED ORAL at 08:04

## 2021-01-22 RX ADMIN — QUETIAPINE SCH MG: 25 TABLET ORAL at 08:04

## 2021-01-22 RX ADMIN — SODIUM CHLORIDE SCH MLS: 0.9 INJECTION, SOLUTION INTRAVENOUS at 01:14

## 2021-01-22 RX ADMIN — CEFTRIAXONE SCH MLS: 1 INJECTION, SOLUTION INTRAVENOUS at 08:03

## 2021-01-22 RX ADMIN — Medication SCH: at 08:04

## 2021-01-22 RX ADMIN — Medication SCH ML: at 23:31

## 2021-01-22 RX ADMIN — QUETIAPINE SCH MG: 25 TABLET ORAL at 23:32

## 2021-01-22 RX ADMIN — ACETAMINOPHEN PRN MG: 500 TABLET, FILM COATED ORAL at 00:34

## 2021-01-22 RX ADMIN — ENOXAPARIN SODIUM SCH MG: 30 INJECTION SUBCUTANEOUS at 08:03

## 2021-01-22 RX ADMIN — CHLORTHALIDONE SCH MG: 25 TABLET ORAL at 08:04

## 2021-01-22 RX ADMIN — TAMSULOSIN HYDROCHLORIDE SCH MG: 0.4 CAPSULE ORAL at 21:00

## 2021-01-22 RX ADMIN — SODIUM CHLORIDE SCH: 0.9 INJECTION, SOLUTION INTRAVENOUS at 14:34

## 2021-01-22 NOTE — P.PN
Subjective


Date of Service: 01/22/21


Chief Complaint: Altered mental status, weakness, UTI


Subjective: No new changes (slightly more awake/alert. pleasantly confused. 

oriented to self only. has been scratching at his scrotum and caused skin tear, 

no erythema, no skin lesions seen. Unable to tell me if scrotum itches or has 

been hurting. noted long nails. pt without complaints)





Review of Systems


10-point ROS is otherwise unremarkable





Physical Examination





- Vital Signs


Temperature: 99.3 F


Blood Pressure: 168/82


Pulse: 77


Respirations: 19


Pulse Ox (%): 95





- Studies


Laboratory Data (last 24 hrs)





01/22/21 04:24: Sodium 137, Potassium 4.0, BUN 39 H, Creatinine 1.94 H, Glucose 

119 H, Magnesium 2.6 H D, Total Bilirubin 0.8, AST 69 H, ALT 26, Alkaline 

Phosphatase 76


01/22/21 04:24: WBC 8.2, Hgb 12.0 L, Hct 36.4 L, Plt Count 163








Assessment & Plan


Physician Review Additional Text: 





Metabolic encephalopathy secondary to UTI


Generalized weakness.


Hypertension


DM 2


GERD


BPH


Dementia 





metabolic encephalopathy likely secondary to UTI, pt with fever at home, 

elevated procalcitonin


UA suggestive of UTI, however urine culture negative


continue rocephin


procal improving


mentation about the same - may take longer as patient has minimal reserve given 

his significant brain atrophy and microvascular ischemic changes


PT/OT consulted


Patient without any nuchal rigidity, no current headache at this time, no 

photophobia, and do not suspect meningitis at this time


apply antifungal to scrotum for now, monitor patient closely to not scratch at 

scrotum and tear more skin, constant need for redirection, may need mittens





VTE: lovenox


Dispo:  PT recommending SNF





Time Spent Managing Pts Care (In Minutes): 35

## 2021-01-23 LAB
ALBUMIN SERPL BCP-MCNC: 3.1 G/DL (ref 3.4–5)
ALP SERPL-CCNC: 70 U/L (ref 45–117)
ALT SERPL W P-5'-P-CCNC: 24 U/L (ref 12–78)
AST SERPL W P-5'-P-CCNC: 68 U/L (ref 15–37)
BUN BLD-MCNC: 38 MG/DL (ref 7–18)
CRP SERPL-MCNC: 144 MG/L (ref ?–3)
FERRITIN SERPL-MCNC: 371.6 NG/ML (ref 26–388)
GLUCOSE SERPLBLD-MCNC: 112 MG/DL (ref 74–106)
HCT VFR BLD CALC: 36.7 % (ref 39.6–49)
LYMPHOCYTES # SPEC AUTO: 2.5 K/UL (ref 0.7–4.9)
MAGNESIUM SERPL-MCNC: 2.6 MG/DL (ref 1.8–2.4)
PMV BLD: 8.8 FL (ref 7.6–11.3)
POTASSIUM SERPL-SCNC: 3.7 MMOL/L (ref 3.5–5.1)
RBC # BLD: 4.18 M/UL (ref 4.33–5.43)
UA DIPSTICK W REFLEX MICRO PNL UR: (no result)

## 2021-01-23 RX ADMIN — AMLODIPINE BESYLATE SCH MG: 10 TABLET ORAL at 10:31

## 2021-01-23 RX ADMIN — CEFTRIAXONE SCH MLS: 1 INJECTION, SOLUTION INTRAVENOUS at 10:34

## 2021-01-23 RX ADMIN — ACETAMINOPHEN PRN MG: 500 TABLET, FILM COATED ORAL at 11:17

## 2021-01-23 RX ADMIN — SODIUM CHLORIDE SCH MLS: 0.9 INJECTION, SOLUTION INTRAVENOUS at 06:03

## 2021-01-23 RX ADMIN — TAMSULOSIN HYDROCHLORIDE SCH MG: 0.4 CAPSULE ORAL at 21:34

## 2021-01-23 RX ADMIN — ACETAMINOPHEN PRN MG: 500 TABLET, FILM COATED ORAL at 21:36

## 2021-01-23 RX ADMIN — Medication SCH ML: at 10:32

## 2021-01-23 RX ADMIN — QUETIAPINE SCH MG: 25 TABLET ORAL at 10:31

## 2021-01-23 RX ADMIN — CLOPIDOGREL BISULFATE SCH MG: 75 TABLET, FILM COATED ORAL at 10:31

## 2021-01-23 RX ADMIN — ENOXAPARIN SODIUM SCH MG: 30 INJECTION SUBCUTANEOUS at 10:32

## 2021-01-23 RX ADMIN — METHYLPREDNISOLONE SODIUM SUCCINATE SCH MG: 40 INJECTION, POWDER, FOR SOLUTION INTRAMUSCULAR; INTRAVENOUS at 21:35

## 2021-01-23 RX ADMIN — QUETIAPINE SCH MG: 25 TABLET ORAL at 21:00

## 2021-01-23 RX ADMIN — Medication SCH PATCH: at 21:43

## 2021-01-23 RX ADMIN — SODIUM CHLORIDE SCH: 0.9 INJECTION, SOLUTION INTRAVENOUS at 17:03

## 2021-01-23 RX ADMIN — CHLORTHALIDONE SCH MG: 25 TABLET ORAL at 10:32

## 2021-01-23 RX ADMIN — Medication SCH ML: at 21:36

## 2021-01-23 NOTE — P.PN
Subjective


Date of Service: 01/23/21


Chief Complaint: Altered mental status, weakness, UTI


Subjective: Other (remains AAOx1, pleasantly confused. unable to have meaningful

conversation, seems to have pain of R leg, points from hip to calf.)





Review of Systems


10-point ROS is otherwise unremarkable





Physical Examination





- Vital Signs


Temperature: 98.9 F


Blood Pressure: 98/52


Pulse: 70


Respirations: 18


Pulse Ox (%): 94





- Studies


Microbiology Data (last 24 hrs): 








01/20/21 18:30   Clean Catch Urine   [place] Count - Final


                            No growth.


01/20/21 18:30   Clean Catch Urine    - Final


                            No growth.








Assessment & Plan


Physician Review Additional Text: 





Physical Exam:


Gen: AAOx1


HEENT: normal conjunctiva


CV: RRR, no edema


Pulm: CTAB, dimished at bases slightly


Abd: soft, NTND


Ext: TTP at R trochanter and R calf. Calf pt with dorsiflexion of R foot. b/l 

legs warm/well-perfused








Metabolic encephalopathy secondary to UTI


Generalized weakness.


s/p fall at home


R leg pain


Hypertension


DM 2


GERD


BPH


Dementia 





metabolic encephalopathy likely secondary to UTI, pt with fever at home, 

elevated procalcitonin


Patient without any nuchal rigidity, no current headache at this time, no 

photophobia, and do not suspect meningitis at this time


UA suggestive of UTI, however urine culture negative;  procal trending down, 

will continue rocephin for now


mentation about the same - may take longer as patient has minimal reserve given 

his significant brain atrophy and microvascular ischemic changes


now developing COVID-19 pneumonia signs on CXR, breathing comfortably on RA, CRP

and ferritin increasing, will start solumedrol, give ivermectin as well


PT/OT consulted


Daughter reports R leg pain from hip to calf since fall at home ~1 week ago


R calf tenderness on exam


r/o DVT, get x-rays of R leg


discussed with radiology CT without evidence of fracture of R femur


continue antifungal cream to scrotum - patient scratched and tore skin





VTE: lovenox


Dispo:  PT recommending SNF


Daughter reports baseline is walking slowly, AAOx3, with "bad" short term memory





Time Spent Managing Pts Care (In Minutes): 35

## 2021-01-23 NOTE — RAD REPORT
EXAM DESCRIPTION:  RAD - Tib Fib Right - 1/23/2021 4:34 pm

 

CLINICAL HISTORY:  Right leg pain

 

FINDINGS:  No fracture is seen.

## 2021-01-23 NOTE — RAD REPORT
EXAM DESCRIPTION:

RAD - Knee Right 2 View - 1/23/2021 4:34 pm

 

CLINICAL HISTORY:  Right knee pain

 

FINDINGS:  No fracture or dislocation seen.

 

Right the prosthesis is in good position without evidence of loosening

## 2021-01-23 NOTE — RAD REPORT
EXAM DESCRIPTION:  Anastasia Single View1/23/2021 12:25 am

 

CLINICAL HISTORY:  Fever

 

COMPARISON:  January 20, 2021

 

FINDINGS:  Mild-to-moderate bilateral pulmonary opacities unchanged. Heart remains enlarged

 

IMPRESSION:   Mild to moderate bilateral pulmonary opacities likely pneumonia

## 2021-01-23 NOTE — RAD REPORT
EXAM DESCRIPTION:  RAD - Femur Right - 1/23/2021 4:34 pm

 

CLINICAL HISTORY:  Leg pain

 

FINDINGS:  No fracture is seen.

 

Bones appear osteoporotic

## 2021-01-23 NOTE — RAD REPORT
EXAM DESCRIPTION:  [facility]1/23/2021 4:24 pm

 

CLINICAL HISTORY:  Right leg pain

 

COMPARISON:  2010

 

FINDINGS:  Right common femoral, superficial femoral, popliteal and right posterior tibial veins are 
compressible and demonstrate augmentation. Doppler demonstrates good flow.

 

IMPRESSION:  No evidence of deep venous thrombosis involving the right lower extremity.

## 2021-01-23 NOTE — RAD REPORT
EXAM DESCRIPTION:  CT - Abdomen   Pelvis Wo Contrast - 1/23/2021 7:03 am

 

CLINICAL HISTORY:  Abdominal  pain

 

COMPARISON:   2018

 

TECHNIQUE:  Computed axial tomography of the abdomen and pelvis was obtained. IV and oral contrast we
re not requested.

 

All CT scans are performed using dose optimization technique as appropriate and may include automated
 exposure control or mA/KV adjustment according to patient size.

 

FINDINGS:   The evaluation of solid organs, vessels and bowel is limited secondary to the lack of con
trast administration.

 

Some images are degraded by patient motion artifact

 

Mild-to-moderate bibasilar ground-glass opacities within the lungs.

 

Cholelithiasis. The gallbladder is contracted. Gallbladder wall does not appear thickened

 

The liver, spleen, pancreas, and adrenals appear grossly normal.

 

Bilateral renal cysts.

 

The prostate gland is mildly enlarged. Small bilateral inguinal hernias.

 

There is no evidence of diverticulitis.

 

No right hip joint effusion. Femoral fracture is not visualized

 

IMPRESSION:  Mild to moderate bilateral ground-glass opacities within the lungs likely Covid pneumoni
a.

 

Cholelithiasis. Gallbladder wall does not appear thickened

## 2021-01-23 NOTE — RAD REPORT
EXAM DESCRIPTION:  MRI - Brain Wo Cont - 1/22/2021 9:02 pm

 

CLINICAL HISTORY:  Syncope

 

COMPARISON:  January 20, 2021 head CT

 

TECHNIQUE:  Axial, sagittal, and coronal magnetic images of the brain were obtained. Contrast was not
 requested

 

FINDINGS:  Images are degraded by patient motion artifact.

 

Mild to moderate signal within periventricular, deep and subcortical white matter likely ischemic gabino
nges secondary to small vessel disease.

 

Cerebral atrophy is present. Prominence of ventricles probably related to white matter atrophy

 

Diffusion-weighted/ADC mapping does not reveal evidence of acute infarction.

 

An extra-axial fluid collection is not present

 

Fluid within the sinuses/mastoids is not noted

 

IMPRESSION:  No acute abnormality is displayed

## 2021-01-24 LAB
ALT SERPL W P-5'-P-CCNC: 22 U/L (ref 12–78)
AST SERPL W P-5'-P-CCNC: 57 U/L (ref 15–37)
BUN BLD-MCNC: 46 MG/DL (ref 7–18)
CRP SERPL-MCNC: 161 MG/L (ref ?–3)
FERRITIN SERPL-MCNC: 466.3 NG/ML (ref 26–388)
GLUCOSE SERPLBLD-MCNC: 157 MG/DL (ref 74–106)
HCT VFR BLD CALC: 34.6 % (ref 39.6–49)
LYMPHOCYTES # SPEC AUTO: 1.5 K/UL (ref 0.7–4.9)
MAGNESIUM SERPL-MCNC: 2.7 MG/DL (ref 1.8–2.4)
PMV BLD: 8.6 FL (ref 7.6–11.3)
POTASSIUM SERPL-SCNC: 3.9 MMOL/L (ref 3.5–5.1)
RBC # BLD: 3.92 M/UL (ref 4.33–5.43)

## 2021-01-24 RX ADMIN — CEFTRIAXONE SCH MLS: 1 INJECTION, SOLUTION INTRAVENOUS at 09:48

## 2021-01-24 RX ADMIN — CLOPIDOGREL BISULFATE SCH MG: 75 TABLET, FILM COATED ORAL at 09:47

## 2021-01-24 RX ADMIN — Medication SCH ML: at 22:51

## 2021-01-24 RX ADMIN — AMLODIPINE BESYLATE SCH MG: 10 TABLET ORAL at 09:47

## 2021-01-24 RX ADMIN — QUETIAPINE SCH MG: 25 TABLET ORAL at 22:51

## 2021-01-24 RX ADMIN — Medication SCH ML: at 09:48

## 2021-01-24 RX ADMIN — QUETIAPINE SCH MG: 25 TABLET ORAL at 09:49

## 2021-01-24 RX ADMIN — METHYLPREDNISOLONE SODIUM SUCCINATE SCH MG: 40 INJECTION, POWDER, FOR SOLUTION INTRAMUSCULAR; INTRAVENOUS at 22:52

## 2021-01-24 RX ADMIN — TAMSULOSIN HYDROCHLORIDE SCH MG: 0.4 CAPSULE ORAL at 22:51

## 2021-01-24 RX ADMIN — METHYLPREDNISOLONE SODIUM SUCCINATE SCH MG: 40 INJECTION, POWDER, FOR SOLUTION INTRAMUSCULAR; INTRAVENOUS at 09:47

## 2021-01-24 RX ADMIN — Medication SCH PATCH: at 09:00

## 2021-01-24 RX ADMIN — ENOXAPARIN SODIUM SCH MG: 30 INJECTION SUBCUTANEOUS at 09:47

## 2021-01-24 NOTE — P.PN
Subjective


Date of Service: 01/24/21


Chief Complaint: Altered mental status, weakness, UTI


Subjective: Improving (More alert and oriented today.  Able to have a 

conversation.  Reports pain in her right calf.  Denies shortness of breath or 

cough.  Denies abdominal pain. without complaints)





Review of Systems


10-point ROS is otherwise unremarkable





Physical Examination





- Vital Signs


Temperature: 97.7 F


Blood Pressure: 135/65


Pulse: 62


Respirations: 17


Pulse Ox (%): 93





- Studies


Microbiology Data (last 24 hrs): 








01/20/21 18:30   Clean Catch Urine   [place] Count - Final


                            No growth.


01/20/21 18:30   Clean Catch Urine    - Final


                            No growth.








Assessment & Plan


Physician Review Additional Text: 





Physical Exam:


Gen: AAOx2, more conversive


HEENT: normal conjunctiva


CV: RRR, no edema


Pulm: CTAB, diminished at bases slightly, on RA


Abd: soft, NTND


Ext: TTP R calf. b/l legs warm/well-perfused





Problem List:


Metabolic encephalopathy secondary to UTI


Generalized weakness.


s/p fall at home


R leg pain


Hypertension


DM 2


GERD


BPH


Dementia 





metabolic encephalopathy likely secondary to UTI, pt with fever at home, 

elevated procalcitonin


UA suggestive of UTI, however urine culture negative;  procal trending down, 

will continue rocephin for now


Mentation moderately improved today, patient has minimal reserve given his 

significant brain atrophy and microvascular ischemic changes


now developing COVID-19 pneumonia signs on CXR, breathing comfortably on RA, CRP

and ferritin increasing,


Given 1st dose of liver mass done on 01/23, increase Solu-Medrol from 60 mg to 

80 mg b.i.d., will start remdesevir


PT/OT consulted


Xrays and RLE U/S negative for fracture or DVT


discussed with radiology CT without evidence of fracture of R femur


continue antifungal cream to scrotum - patient scratched and tore skin 





VTE: lovenox


Dispo:  PT recommending SNF. anticipate patient will be ready for dc in 24-

48hrs, high risk for complication with COVID as well


Daughter reports baseline is walking slowly, AAOx3, with "bad" short term 

memory. getting closer to his baseline


CRP/ferritin increasing, will trend and continue to monitor.





Time Spent Managing Pts Care (In Minutes): 35

## 2021-01-25 LAB
BUN BLD-MCNC: 51 MG/DL (ref 7–18)
CRP SERPL-MCNC: 105 MG/L (ref ?–3)
FERRITIN SERPL-MCNC: 518.2 NG/ML (ref 26–388)
GLUCOSE SERPLBLD-MCNC: 165 MG/DL (ref 74–106)
HCT VFR BLD CALC: 33.6 % (ref 39.6–49)
MAGNESIUM SERPL-MCNC: 2.7 MG/DL (ref 1.8–2.4)
PMV BLD: 9 FL (ref 7.6–11.3)
POTASSIUM SERPL-SCNC: 3.9 MMOL/L (ref 3.5–5.1)
RBC # BLD: 3.81 M/UL (ref 4.33–5.43)

## 2021-01-25 RX ADMIN — CEFTRIAXONE SCH MLS: 1 INJECTION, SOLUTION INTRAVENOUS at 09:02

## 2021-01-25 RX ADMIN — ACETAMINOPHEN PRN MG: 500 TABLET, FILM COATED ORAL at 21:38

## 2021-01-25 RX ADMIN — Medication SCH ML: at 21:36

## 2021-01-25 RX ADMIN — METHYLPREDNISOLONE SODIUM SUCCINATE SCH MG: 40 INJECTION, POWDER, FOR SOLUTION INTRAMUSCULAR; INTRAVENOUS at 09:04

## 2021-01-25 RX ADMIN — Medication SCH PATCH: at 08:59

## 2021-01-25 RX ADMIN — METHYLPREDNISOLONE SODIUM SUCCINATE SCH MG: 125 INJECTION, POWDER, FOR SOLUTION INTRAMUSCULAR; INTRAVENOUS at 21:37

## 2021-01-25 RX ADMIN — AMLODIPINE BESYLATE SCH MG: 10 TABLET ORAL at 09:03

## 2021-01-25 RX ADMIN — CHLORTHALIDONE SCH MG: 25 TABLET ORAL at 13:52

## 2021-01-25 RX ADMIN — CLOPIDOGREL BISULFATE SCH MG: 75 TABLET, FILM COATED ORAL at 09:03

## 2021-01-25 RX ADMIN — QUETIAPINE SCH MG: 25 TABLET ORAL at 21:37

## 2021-01-25 RX ADMIN — ENOXAPARIN SODIUM SCH MG: 30 INJECTION SUBCUTANEOUS at 09:04

## 2021-01-25 RX ADMIN — QUETIAPINE SCH MG: 25 TABLET ORAL at 09:04

## 2021-01-25 RX ADMIN — Medication SCH ML: at 09:02

## 2021-01-25 RX ADMIN — TAMSULOSIN HYDROCHLORIDE SCH MG: 0.4 CAPSULE ORAL at 21:37

## 2021-01-25 NOTE — P.PN
Subjective


Date of Service: 01/25/21


Chief Complaint: Altered mental status, weakness, UTI


Subjective: Improving (More alert and oriented, more conversive today, reports 

having a cough)





Review of Systems


10-point ROS is otherwise unremarkable





Physical Examination





- Vital Signs


Temperature: 97.5 F


Blood Pressure: 133/62


Pulse: 60


Respirations: 16


Pulse Ox (%): 94





Assessment & Plan


Physician Review Additional Text: 





Physical Exam:


Gen: AAOx2, pleasant, still with confusion


HEENT: normal conjunctiva


CV: RRR, no edema


Pulm: diminished at bases slightly, on RA


Abd: soft, NTND


Ext: mild TTP at R calf and R hip





Problem List:


Metabolic encephalopathy secondary to UTI


Generalized weakness.


s/p fall at home


R leg pain


Hypertension


DM 2


GERD


BPH


Dementia 





metabolic encephalopathy likely secondary to UTI, pt with fever at home, 

elevated procalcitonin


UA suggestive of UTI, however urine culture negative;  procal trending down, 

will continue rocephin for now


Possible prostatitis, patient refused rectal exam, reported no difficulty 

stooling or urinating


Mentation has improved, getting close to baseline;  patient has minimal reserve 

given his significant brain atrophy and microvascular ischemic changes


now developing COVID-19 pneumonia signs on CXR, breathing comfortably on RA, 

ferritin increasing, CRP down today, +cough


Given 1st dose of ivermectin done on 01/24, increased Solu-Medrol from 60 mg to 

80 mg b.i.d. on 1/24


PT/OT consulted


Xrays and RLE U/S negative for fracture or DVT


discussed with radiology CT without evidence of fracture of R femur


continue antifungal cream to scrotum - patient scratched and tore skin when he 

was confused





VTE: lovenox


Dispo:  PT recommending SNF. to work with patient again today. anticipate he 

will be ready in 24 hrs. SS/CM consulted for assistance with SNF placement


Daughter reports baseline is walking slowly, AAOx3, with "bad" short term 

memory. getting closer to his baseline


ferritin increasing, CRP improving will trend and continue to monitor.





Time Spent Managing Pts Care (In Minutes): 35

## 2021-01-26 LAB
BUN BLD-MCNC: 52 MG/DL (ref 7–18)
CRP SERPL-MCNC: 95.6 MG/L (ref ?–3)
FERRITIN SERPL-MCNC: 521.2 NG/ML (ref 26–388)
GLUCOSE SERPLBLD-MCNC: 161 MG/DL (ref 74–106)
HCT VFR BLD CALC: 34.2 % (ref 39.6–49)
MAGNESIUM SERPL-MCNC: 3 MG/DL (ref 1.8–2.4)
PMV BLD: 9.1 FL (ref 7.6–11.3)
POTASSIUM SERPL-SCNC: 3.9 MMOL/L (ref 3.5–5.1)
RBC # BLD: 3.9 M/UL (ref 4.33–5.43)

## 2021-01-26 PROCEDURE — 5A09557 ASSISTANCE WITH RESPIRATORY VENTILATION, GREATER THAN 96 CONSECUTIVE HOURS, CONTINUOUS POSITIVE AIRWAY PRESSURE: ICD-10-PCS

## 2021-01-26 RX ADMIN — CLOPIDOGREL BISULFATE SCH MG: 75 TABLET, FILM COATED ORAL at 09:35

## 2021-01-26 RX ADMIN — TAMSULOSIN HYDROCHLORIDE SCH MG: 0.4 CAPSULE ORAL at 21:08

## 2021-01-26 RX ADMIN — Medication SCH ML: at 21:08

## 2021-01-26 RX ADMIN — QUETIAPINE SCH MG: 25 TABLET ORAL at 09:34

## 2021-01-26 RX ADMIN — Medication SCH ML: at 21:00

## 2021-01-26 RX ADMIN — Medication SCH ML: at 09:36

## 2021-01-26 RX ADMIN — METHYLPREDNISOLONE SODIUM SUCCINATE SCH MG: 125 INJECTION, POWDER, FOR SOLUTION INTRAMUSCULAR; INTRAVENOUS at 21:08

## 2021-01-26 RX ADMIN — AMLODIPINE BESYLATE SCH MG: 10 TABLET ORAL at 09:35

## 2021-01-26 RX ADMIN — BENZONATATE PRN MG: 100 CAPSULE ORAL at 21:07

## 2021-01-26 RX ADMIN — Medication SCH PATCH: at 09:35

## 2021-01-26 RX ADMIN — CHLORTHALIDONE SCH MG: 25 TABLET ORAL at 13:17

## 2021-01-26 RX ADMIN — CEFTRIAXONE SCH MLS: 1 INJECTION, SOLUTION INTRAVENOUS at 09:34

## 2021-01-26 RX ADMIN — QUETIAPINE SCH MG: 25 TABLET ORAL at 21:08

## 2021-01-26 RX ADMIN — ENOXAPARIN SODIUM SCH MG: 30 INJECTION SUBCUTANEOUS at 09:36

## 2021-01-26 RX ADMIN — METHYLPREDNISOLONE SODIUM SUCCINATE SCH MG: 125 INJECTION, POWDER, FOR SOLUTION INTRAMUSCULAR; INTRAVENOUS at 09:35

## 2021-01-27 LAB
ALBUMIN SERPL BCP-MCNC: 2.5 G/DL (ref 3.4–5)
ALP SERPL-CCNC: 66 U/L (ref 45–117)
ALT SERPL W P-5'-P-CCNC: 25 U/L (ref 12–78)
AST SERPL W P-5'-P-CCNC: 50 U/L (ref 15–37)
BLD SMEAR INTERP: (no result)
BUN BLD-MCNC: 59 MG/DL (ref 7–18)
CRP SERPL-MCNC: 171 MG/L (ref ?–3)
FERRITIN SERPL-MCNC: 674.9 NG/ML (ref 26–388)
GLUCOSE SERPLBLD-MCNC: 191 MG/DL (ref 74–106)
HCT VFR BLD CALC: 34.8 % (ref 39.6–49)
LYMPHOCYTES # SPEC AUTO: 1.1 K/UL (ref 0.7–4.9)
MAGNESIUM SERPL-MCNC: 3.2 MG/DL (ref 1.8–2.4)
MORPHOLOGY BLD-IMP: (no result)
NT-PROBNP SERPL-MCNC: 1349 PG/ML (ref ?–450)
PMV BLD: 9 FL (ref 7.6–11.3)
POTASSIUM SERPL-SCNC: 3.9 MMOL/L (ref 3.5–5.1)
RBC # BLD: 3.97 M/UL (ref 4.33–5.43)

## 2021-01-27 RX ADMIN — ENOXAPARIN SODIUM SCH MG: 30 INJECTION SUBCUTANEOUS at 08:59

## 2021-01-27 RX ADMIN — AMLODIPINE BESYLATE SCH MG: 10 TABLET ORAL at 08:59

## 2021-01-27 RX ADMIN — CEFTRIAXONE SCH MLS: 1 INJECTION, SOLUTION INTRAVENOUS at 08:58

## 2021-01-27 RX ADMIN — BENZONATATE PRN MG: 100 CAPSULE ORAL at 20:26

## 2021-01-27 RX ADMIN — CLOPIDOGREL BISULFATE SCH MG: 75 TABLET, FILM COATED ORAL at 08:59

## 2021-01-27 RX ADMIN — Medication SCH ML: at 20:28

## 2021-01-27 RX ADMIN — Medication SCH ML: at 20:30

## 2021-01-27 RX ADMIN — Medication SCH ML: at 09:01

## 2021-01-27 RX ADMIN — TAMSULOSIN HYDROCHLORIDE SCH MG: 0.4 CAPSULE ORAL at 20:27

## 2021-01-27 RX ADMIN — QUETIAPINE SCH MG: 25 TABLET ORAL at 08:59

## 2021-01-27 RX ADMIN — BENZONATATE PRN MG: 100 CAPSULE ORAL at 05:30

## 2021-01-27 RX ADMIN — Medication SCH PATCH: at 09:01

## 2021-01-27 RX ADMIN — CHLORTHALIDONE SCH MG: 25 TABLET ORAL at 08:58

## 2021-01-27 RX ADMIN — BENZONATATE PRN MG: 100 CAPSULE ORAL at 09:04

## 2021-01-27 RX ADMIN — METHYLPREDNISOLONE SODIUM SUCCINATE SCH MG: 125 INJECTION, POWDER, FOR SOLUTION INTRAMUSCULAR; INTRAVENOUS at 09:00

## 2021-01-27 RX ADMIN — QUETIAPINE SCH MG: 25 TABLET ORAL at 20:27

## 2021-01-27 RX ADMIN — METHYLPREDNISOLONE SODIUM SUCCINATE SCH MG: 125 INJECTION, POWDER, FOR SOLUTION INTRAMUSCULAR; INTRAVENOUS at 20:27

## 2021-01-27 NOTE — P.PN
Subjective


Date of Service: 01/26/21





Patient with dementia.  He does not really answer any of my questions 

appropriately.  Patient remains hypoxic and was placed on high-flow according to

the nurses today.  Patient will be monitored closely.  Family is not wanting him

to go to a nursing facility.





Review of Systems


10-point ROS is otherwise unremarkable





Physical Examination





- Vital Signs


Temperature: 97 F


Blood Pressure: 151/67


Pulse: 60


Respirations: 22


Pulse Ox (%): 95





- Physical Exam


General: Alert, In no apparent distress, Oriented x3


Respiratory: Clear to auscultation bilaterally, Normal air movement


Cardiovascular: Regular rate/rhythm, Normal S1 S2, No murmurs


Gastrointestinal: Normal bowel sounds, Soft and benign, Non-distended, No 

tenderness


Musculoskeletal: No clubbing, No swelling, No tenderness


Neurological: Sensation intact, Cranial nerves 3-12 intact





- Studies


Medications List Reviewed: Yes





Assessment & Plan





- Problems (Diagnosis)


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


Current Visit: Yes   Status: Acute   





(2) UTI (urinary tract infection)


Onset Date: 09/18/17   Current Visit: No   Status: Acute   





(3) Weakness generalized


Onset Date: 09/18/17   Current Visit: No   Status: Acute   





(4) Hypertension


Current Visit: Yes   Status: Acute   





(5) Type 2 diabetes mellitus


Current Visit: Yes   Status: Acute   





(6) Pneumonia due to COVID-19 virus


Current Visit: Yes   Status: Acute   





- Plan





1. Continue supportive care with high-flow oxygen


2. IV steroids


3. Repeat chest x-ray  is symptoms are progressively worsening


4. Continue with Pulmonary consultation 


5. GI and DVT prophylaxis





Discharge Plan: Home


Plan to discharge in: Greater than 2 days





- Advance Directives


Does patient have a Living Will: No


Does patient have a Durable POA for Healthcare: No





- Code Status/Comfort Care


Code Status Assessed: Yes


Code Status: Full Code


Critical Care: No


Time Spent Managing PTS Care (In Minutes): 35

## 2021-01-28 RX ADMIN — METHYLPREDNISOLONE SODIUM SUCCINATE SCH MG: 125 INJECTION, POWDER, FOR SOLUTION INTRAMUSCULAR; INTRAVENOUS at 09:28

## 2021-01-28 RX ADMIN — AMLODIPINE BESYLATE SCH MG: 10 TABLET ORAL at 09:29

## 2021-01-28 RX ADMIN — Medication SCH ML: at 09:29

## 2021-01-28 RX ADMIN — Medication SCH ML: at 09:30

## 2021-01-28 RX ADMIN — Medication SCH ML: at 21:24

## 2021-01-28 RX ADMIN — METHYLPREDNISOLONE SODIUM SUCCINATE SCH MG: 125 INJECTION, POWDER, FOR SOLUTION INTRAMUSCULAR; INTRAVENOUS at 21:24

## 2021-01-28 RX ADMIN — ENOXAPARIN SODIUM SCH MG: 30 INJECTION SUBCUTANEOUS at 09:28

## 2021-01-28 RX ADMIN — BENZONATATE PRN MG: 100 CAPSULE ORAL at 21:23

## 2021-01-28 RX ADMIN — Medication SCH PATCH: at 09:29

## 2021-01-28 RX ADMIN — Medication SCH ML: at 21:00

## 2021-01-28 RX ADMIN — CLOPIDOGREL BISULFATE SCH MG: 75 TABLET, FILM COATED ORAL at 09:28

## 2021-01-28 RX ADMIN — CHLORTHALIDONE SCH MG: 25 TABLET ORAL at 09:28

## 2021-01-28 RX ADMIN — TAMSULOSIN HYDROCHLORIDE SCH MG: 0.4 CAPSULE ORAL at 21:24

## 2021-01-28 RX ADMIN — QUETIAPINE SCH MG: 25 TABLET ORAL at 21:24

## 2021-01-28 RX ADMIN — QUETIAPINE SCH MG: 25 TABLET ORAL at 09:28

## 2021-01-29 LAB
ALBUMIN SERPL BCP-MCNC: 2.2 G/DL (ref 3.4–5)
ALP SERPL-CCNC: 77 U/L (ref 45–117)
ALT SERPL W P-5'-P-CCNC: 36 U/L (ref 12–78)
AST SERPL W P-5'-P-CCNC: 46 U/L (ref 15–37)
BLD SMEAR INTERP: (no result)
BUN BLD-MCNC: 66 MG/DL (ref 7–18)
CRP SERPL-MCNC: 160 MG/L (ref ?–3)
FERRITIN SERPL-MCNC: 855.5 NG/ML (ref 26–388)
GLUCOSE SERPLBLD-MCNC: 179 MG/DL (ref 74–106)
HCT VFR BLD CALC: 35.7 % (ref 39.6–49)
LYMPHOCYTES # SPEC AUTO: 0.9 K/UL (ref 0.7–4.9)
MAGNESIUM SERPL-MCNC: 3.1 MG/DL (ref 1.8–2.4)
MORPHOLOGY BLD-IMP: (no result)
NT-PROBNP SERPL-MCNC: 1164 PG/ML (ref ?–450)
PMV BLD: 9.1 FL (ref 7.6–11.3)
POTASSIUM SERPL-SCNC: 4 MMOL/L (ref 3.5–5.1)
RBC # BLD: 4.03 M/UL (ref 4.33–5.43)

## 2021-01-29 RX ADMIN — Medication SCH ML: at 20:25

## 2021-01-29 RX ADMIN — QUETIAPINE SCH MG: 25 TABLET ORAL at 09:22

## 2021-01-29 RX ADMIN — CHLORTHALIDONE SCH MG: 25 TABLET ORAL at 09:22

## 2021-01-29 RX ADMIN — Medication SCH ML: at 09:22

## 2021-01-29 RX ADMIN — QUETIAPINE SCH MG: 25 TABLET ORAL at 20:30

## 2021-01-29 RX ADMIN — ACETAMINOPHEN PRN MG: 500 TABLET, FILM COATED ORAL at 00:49

## 2021-01-29 RX ADMIN — Medication SCH PATCH: at 09:23

## 2021-01-29 RX ADMIN — METHYLPREDNISOLONE SODIUM SUCCINATE SCH MG: 125 INJECTION, POWDER, FOR SOLUTION INTRAMUSCULAR; INTRAVENOUS at 20:28

## 2021-01-29 RX ADMIN — ENOXAPARIN SODIUM SCH MG: 100 INJECTION SUBCUTANEOUS at 20:27

## 2021-01-29 RX ADMIN — AMLODIPINE BESYLATE SCH MG: 10 TABLET ORAL at 09:22

## 2021-01-29 RX ADMIN — BENZONATATE PRN MG: 100 CAPSULE ORAL at 20:27

## 2021-01-29 RX ADMIN — Medication SCH ML: at 09:25

## 2021-01-29 RX ADMIN — CLOPIDOGREL BISULFATE SCH MG: 75 TABLET, FILM COATED ORAL at 09:22

## 2021-01-29 RX ADMIN — TAMSULOSIN HYDROCHLORIDE SCH MG: 0.4 CAPSULE ORAL at 20:27

## 2021-01-29 RX ADMIN — Medication SCH ML: at 20:28

## 2021-01-29 RX ADMIN — SODIUM CHLORIDE SCH MG: 0.9 INJECTION, SOLUTION INTRAVENOUS at 20:50

## 2021-01-29 RX ADMIN — ACETAMINOPHEN PRN MG: 500 TABLET, FILM COATED ORAL at 20:27

## 2021-01-29 RX ADMIN — METHYLPREDNISOLONE SODIUM SUCCINATE SCH MG: 125 INJECTION, POWDER, FOR SOLUTION INTRAMUSCULAR; INTRAVENOUS at 09:25

## 2021-01-29 RX ADMIN — ENOXAPARIN SODIUM SCH MG: 100 INJECTION SUBCUTANEOUS at 09:22

## 2021-01-29 NOTE — P.PN
Date of Service: 01/27/21








Subjective


Patient's oxygen requirements increased significantly.  Continue to monitor 

closely.





Review of Systems


10-point ROS is otherwise unremarkable





Physical Examination





- Vital Signs


reviewed





- Physical Exam


General: Alert, In no apparent distress, pleasantly confused


Respiratory: Clear to auscultation bilaterally, Normal air movement


Cardiovascular: Regular rate/rhythm, Normal S1 S2, No murmurs


Gastrointestinal: Normal bowel sounds, Soft and benign, Non-distended, No 

tenderness


Musculoskeletal: No clubbing, No swelling, No tenderness


Neurological:  Follows my commands and moves all extremities








Assessment & Plan





- Problems (Diagnosis)


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


Current Visit: Yes   Status: Acute   





(2) UTI (urinary tract infection)-?


Onset Date: 09/18/17   Current Visit: No   Status: Acute   





(3) Weakness generalized


Onset Date: 09/18/17   Current Visit: No   Status: Acute   





(4) Hypertension


Current Visit: Yes   Status: Acute   





(5) Type 2 diabetes mellitus


Current Visit: Yes   Status: Acute   





(6) Pneumonia due to COVID-19 virus


Current Visit: Yes   Status: Acute   





- Plan


1. Continue supportive care with high-flow oxygen


2. IV steroids


3. Repeat chest x-ray  is symptoms are progressively worsening


4. Continue with antibiotic therapy at this time.  If cultures are negative then

will discontinue


5. Placement concerns with family


6. GI and DVT prophylaxis

## 2021-01-29 NOTE — P.PN
Date of Service: 01/29/21


Patient's condition declining.  Requiring more and more oxygen at this time.  

Currently on high-flow at 100%.  Also has a face mask on top of that.  Spoke 

with daughter and she is his medical power of  if patient is a do not 

attempt resuscitation.  Will continue with current plan of care.  Family may 

need to consider palliative care if patient's condition worsens.  They do want 

to come and visit him as his prognosis is poor.

## 2021-01-30 RX ADMIN — Medication SCH PATCH: at 08:49

## 2021-01-30 RX ADMIN — QUETIAPINE SCH MG: 25 TABLET ORAL at 08:50

## 2021-01-30 RX ADMIN — SODIUM CHLORIDE SCH MG: 0.9 INJECTION, SOLUTION INTRAVENOUS at 20:32

## 2021-01-30 RX ADMIN — ENOXAPARIN SODIUM SCH MG: 100 INJECTION SUBCUTANEOUS at 20:33

## 2021-01-30 RX ADMIN — Medication SCH ML: at 09:00

## 2021-01-30 RX ADMIN — METHYLPREDNISOLONE SODIUM SUCCINATE SCH MG: 125 INJECTION, POWDER, FOR SOLUTION INTRAMUSCULAR; INTRAVENOUS at 08:50

## 2021-01-30 RX ADMIN — ACETAMINOPHEN PRN MG: 500 TABLET, FILM COATED ORAL at 12:32

## 2021-01-30 RX ADMIN — TAMSULOSIN HYDROCHLORIDE SCH MG: 0.4 CAPSULE ORAL at 20:32

## 2021-01-30 RX ADMIN — CHLORTHALIDONE SCH MG: 25 TABLET ORAL at 08:49

## 2021-01-30 RX ADMIN — AMLODIPINE BESYLATE SCH MG: 10 TABLET ORAL at 08:50

## 2021-01-30 RX ADMIN — QUETIAPINE SCH MG: 25 TABLET ORAL at 20:32

## 2021-01-30 RX ADMIN — Medication SCH ML: at 20:31

## 2021-01-30 RX ADMIN — METHYLPREDNISOLONE SODIUM SUCCINATE SCH MG: 125 INJECTION, POWDER, FOR SOLUTION INTRAMUSCULAR; INTRAVENOUS at 20:34

## 2021-01-30 RX ADMIN — Medication SCH ML: at 20:32

## 2021-01-30 RX ADMIN — CLOPIDOGREL BISULFATE SCH MG: 75 TABLET, FILM COATED ORAL at 08:50

## 2021-01-30 RX ADMIN — ENOXAPARIN SODIUM SCH MG: 100 INJECTION SUBCUTANEOUS at 08:49

## 2021-01-30 RX ADMIN — SODIUM CHLORIDE SCH: 0.9 INJECTION, SOLUTION INTRAVENOUS at 09:00

## 2021-01-30 RX ADMIN — Medication SCH ML: at 08:52

## 2021-01-31 LAB
BUN BLD-MCNC: 60 MG/DL (ref 7–18)
GLUCOSE SERPLBLD-MCNC: 195 MG/DL (ref 74–106)
HCT VFR BLD CALC: 34.1 % (ref 39.6–49)
LYMPHOCYTES # SPEC AUTO: 0.7 K/UL (ref 0.7–4.9)
PMV BLD: 9 FL (ref 7.6–11.3)
POTASSIUM SERPL-SCNC: 4.2 MMOL/L (ref 3.5–5.1)
RBC # BLD: 3.93 M/UL (ref 4.33–5.43)

## 2021-01-31 RX ADMIN — Medication SCH PATCH: at 09:20

## 2021-01-31 RX ADMIN — PANTOPRAZOLE SODIUM SCH MG: 40 TABLET, DELAYED RELEASE ORAL at 17:09

## 2021-01-31 RX ADMIN — Medication SCH ML: at 21:52

## 2021-01-31 RX ADMIN — CLOPIDOGREL BISULFATE SCH MG: 75 TABLET, FILM COATED ORAL at 09:21

## 2021-01-31 RX ADMIN — TAMSULOSIN HYDROCHLORIDE SCH MG: 0.4 CAPSULE ORAL at 21:51

## 2021-01-31 RX ADMIN — PANTOPRAZOLE SODIUM SCH MG: 40 TABLET, DELAYED RELEASE ORAL at 09:19

## 2021-01-31 RX ADMIN — Medication SCH ML: at 09:00

## 2021-01-31 RX ADMIN — QUETIAPINE SCH MG: 25 TABLET ORAL at 21:51

## 2021-01-31 RX ADMIN — QUETIAPINE SCH MG: 25 TABLET ORAL at 09:21

## 2021-01-31 RX ADMIN — Medication SCH ML: at 09:22

## 2021-01-31 RX ADMIN — CHLORTHALIDONE SCH MG: 25 TABLET ORAL at 09:21

## 2021-01-31 RX ADMIN — ENOXAPARIN SODIUM SCH MG: 100 INJECTION SUBCUTANEOUS at 09:00

## 2021-01-31 RX ADMIN — METHYLPREDNISOLONE SODIUM SUCCINATE SCH MG: 125 INJECTION, POWDER, FOR SOLUTION INTRAMUSCULAR; INTRAVENOUS at 09:21

## 2021-01-31 RX ADMIN — METHYLPREDNISOLONE SODIUM SUCCINATE SCH MG: 125 INJECTION, POWDER, FOR SOLUTION INTRAMUSCULAR; INTRAVENOUS at 21:48

## 2021-01-31 RX ADMIN — ENOXAPARIN SODIUM SCH MG: 100 INJECTION SUBCUTANEOUS at 21:50

## 2021-01-31 RX ADMIN — AMLODIPINE BESYLATE SCH MG: 10 TABLET ORAL at 09:21

## 2021-01-31 RX ADMIN — Medication SCH ML: at 21:48

## 2021-01-31 NOTE — P.PN
Subjective


Date of Service: 01/30/21


Patient remains very hypoxic.  However, patient is more awake.  Patient more 

alert as well.  Family did come to visit with patient.  Patient's wishes were to

be a DNR, and we have continued this.  Continue to monitor patient closely.





Review of Systems


10-point ROS is otherwise unremarkable





Physical Examination





- Vital Signs


Temperature: 98.3 F


Blood Pressure: 140/64


Pulse: 63


Respirations: 22


Pulse Ox (%): 96





- Physical Exam


General: Alert, In no apparent distress, Oriented x3


Respiratory: Diminished, Rhonchi/gurgles


Cardiovascular: Regular rate/rhythm, Normal S1 S2, No murmurs


Gastrointestinal: Normal bowel sounds, Soft and benign, Non-distended, No 

tenderness


Musculoskeletal: No clubbing, No swelling, No tenderness


Neurological: Sensation intact, Cranial nerves 3-12 intact





- Studies


Medications List Reviewed: Yes





Assessment & Plan





- Problems (Diagnosis)


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


Current Visit: Yes   Status: Acute   





(2) UTI (urinary tract infection)


Onset Date: 09/18/17   Current Visit: No   Status: Acute   





(3) Weakness generalized


Onset Date: 09/18/17   Current Visit: No   Status: Acute   





(4) Hypertension


Current Visit: Yes   Status: Acute   





(5) Type 2 diabetes mellitus


Current Visit: Yes   Status: Acute   





(6) Pneumonia due to COVID-19 virus


Current Visit: Yes   Status: Acute   





- Plan





Continue with plan of care as mentioned below


1. Continue supportive care with high-flow oxygen; continue to wean down the 

oxygen slowly


2. IV steroids; may wean down over the next few days his symptoms continued to 

improve


3. Patient is a do not resuscitate


4. Monitor labs closely


5. GI and DVT prophylaxis








- Advance Directives


Does patient have a Living Will: No


Does patient have a Durable POA for Healthcare: No





- Code Status/Comfort Care


Code Status: Full Code

## 2021-01-31 NOTE — P.PN
Subjective


Date of Service: 01/31/21


Patient doing better.  He has started to eat more.  Continue with the supportive

care.  Family has made him a do not resuscitate.  Repeat inflammatory markers.





Review of Systems


10-point ROS is otherwise unremarkable





Physical Examination





- Vital Signs


Temperature: 98.3 F


Blood Pressure: 140/64


Pulse: 63


Respirations: 22


Pulse Ox (%): 96





- Physical Exam


General: Alert, In no apparent distress, Demented (Pleasantly), Confused


Respiratory: Diminished, Crackles/rales


Cardiovascular: Regular rate/rhythm, Normal S1 S2, No murmurs


Gastrointestinal: Soft and benign, Non-distended, No tenderness


Musculoskeletal: No clubbing, No swelling, No tenderness


Neurological: Sensation intact, Cranial nerves 3-12 intact





- Studies


Medications List Reviewed: Yes





Assessment & Plan





- Problems (Diagnosis)


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


Current Visit: Yes   Status: Acute   





(2) UTI (urinary tract infection)


Onset Date: 09/18/17   Current Visit: No   Status: Acute   





(3) Weakness generalized


Onset Date: 09/18/17   Current Visit: No   Status: Acute   





(4) Hypertension


Current Visit: Yes   Status: Acute   





(5) Type 2 diabetes mellitus


Current Visit: Yes   Status: Acute   





(6) Pneumonia due to COVID-19 virus


Current Visit: Yes   Status: Acute   





(7) CKD (chronic kidney disease)


Current Visit: Yes   Status: Acute   





(8) Leukocytosis


Onset Date: 09/18/17   Current Visit: No   Status: Acute   





- Plan





Continue with plan of care as mentioned below


1. Continue supportive care with high-flow oxygen; still on 100% FiO2. 

Hopefully, we will be able to continue to wean down the oxygen slowly


2. IV steroids; may wean down over the next few days his symptoms continued to 

improve


3. Patient is a do not resuscitate


4. Monitor labs closely


5. GI and DVT prophylaxis





Discharge Plan: Home


Plan to discharge in: Greater than 2 days





- Advance Directives


Does patient have a Living Will: No


Does patient have a Durable POA for Healthcare: No





- Code Status/Comfort Care


Code Status: Full Code


Critical Care: No


Time Spent Managing PTS Care (In Minutes): 35

## 2021-02-01 LAB
ALBUMIN SERPL BCP-MCNC: 2.2 G/DL (ref 3.4–5)
ALP SERPL-CCNC: 91 U/L (ref 45–117)
ALT SERPL W P-5'-P-CCNC: 48 U/L (ref 12–78)
AST SERPL W P-5'-P-CCNC: 39 U/L (ref 15–37)
BUN BLD-MCNC: 59 MG/DL (ref 7–18)
CRP SERPL-MCNC: 73.1 MG/L (ref ?–3)
FERRITIN SERPL-MCNC: 1393 NG/ML (ref 26–388)
GLUCOSE SERPLBLD-MCNC: 196 MG/DL (ref 74–106)
HCT VFR BLD CALC: 37.1 % (ref 39.6–49)
LYMPHOCYTES # SPEC AUTO: 0.7 K/UL (ref 0.7–4.9)
MAGNESIUM SERPL-MCNC: 3.4 MG/DL (ref 1.8–2.4)
NT-PROBNP SERPL-MCNC: 902 PG/ML (ref ?–450)
PMV BLD: 9.2 FL (ref 7.6–11.3)
POTASSIUM SERPL-SCNC: 4.3 MMOL/L (ref 3.5–5.1)
RBC # BLD: 4.22 M/UL (ref 4.33–5.43)

## 2021-02-01 RX ADMIN — METHYLPREDNISOLONE SODIUM SUCCINATE SCH MG: 125 INJECTION, POWDER, FOR SOLUTION INTRAMUSCULAR; INTRAVENOUS at 08:52

## 2021-02-01 RX ADMIN — PANTOPRAZOLE SODIUM SCH MG: 40 TABLET, DELAYED RELEASE ORAL at 08:50

## 2021-02-01 RX ADMIN — QUETIAPINE SCH MG: 25 TABLET ORAL at 20:37

## 2021-02-01 RX ADMIN — CHLORTHALIDONE SCH MG: 25 TABLET ORAL at 08:51

## 2021-02-01 RX ADMIN — Medication SCH ML: at 20:36

## 2021-02-01 RX ADMIN — AMLODIPINE BESYLATE SCH MG: 10 TABLET ORAL at 08:51

## 2021-02-01 RX ADMIN — BENZONATATE PRN MG: 100 CAPSULE ORAL at 04:17

## 2021-02-01 RX ADMIN — METHYLPREDNISOLONE SODIUM SUCCINATE SCH MG: 125 INJECTION, POWDER, FOR SOLUTION INTRAMUSCULAR; INTRAVENOUS at 20:36

## 2021-02-01 RX ADMIN — ENOXAPARIN SODIUM SCH MG: 100 INJECTION SUBCUTANEOUS at 08:51

## 2021-02-01 RX ADMIN — Medication SCH ML: at 08:52

## 2021-02-01 RX ADMIN — QUETIAPINE SCH MG: 25 TABLET ORAL at 08:51

## 2021-02-01 RX ADMIN — PANTOPRAZOLE SODIUM SCH MG: 40 TABLET, DELAYED RELEASE ORAL at 16:34

## 2021-02-01 RX ADMIN — CLOPIDOGREL BISULFATE SCH MG: 75 TABLET, FILM COATED ORAL at 08:50

## 2021-02-01 RX ADMIN — TAMSULOSIN HYDROCHLORIDE SCH MG: 0.4 CAPSULE ORAL at 20:37

## 2021-02-01 RX ADMIN — Medication SCH PATCH: at 08:51

## 2021-02-01 RX ADMIN — Medication SCH ML: at 08:51

## 2021-02-01 RX ADMIN — Medication SCH ML: at 20:37

## 2021-02-01 RX ADMIN — ATORVASTATIN CALCIUM SCH MG: 10 TABLET, FILM COATED ORAL at 20:37

## 2021-02-01 NOTE — P.PN
Subjective


Date of Service: 02/01/21


Chief Complaint: Altered mental status, weakness, UTI


Subjective: Other (Patient on non-rebreather.  Overall stable.  Patient with 

dementia)





Physical Examination





- Vital Signs


Temperature: 98.1 F


Blood Pressure: 164/75


Pulse: 63


Respirations: 24


Pulse Ox (%): 97





- Physical Exam


General: Alert, Demented


HEENT: Atraumatic


Neck: Supple


Respiratory: Other (Patient on non-rebreather.  Patient appears stable.)


Cardiovascular: Normal pulses, Regular rate/rhythm


Neurological: Normal strength at 5/5 x4 extr, Dementia





- Studies


Medications List Reviewed: Yes





Assessment & Plan


Discharge Plan: Home


Plan to discharge in: Greater than 2 days


Physician Review Additional Text: 





Impression:


Acute respiratory failure with hypoxia secondary to bilateral COVID 19 pneumonia


Alzheimer dementia


HTN


DM Type 2 





Plan: 


Acute respiratory failure with hypoxia secondary to bilateral COVID 19 

pneumonia:  Patient on non-rebreather.  Continue with IV steroids and 

supplementation.  Patient with underlying dementia but stable.  Continue to wean

off oxygen.  Encourage incentive spirometer.  Will discuss with respiratory.  

Will discuss with pulmonology as well.  Will discuss with family about plan of 

care.


Alzheimer dementia:  Overall stable.  Continue with mood stabilizers.


HTN:  Continue medication


DM Type 2:  Continue Accu-Cheks.  restart oral medication.  Maintain adequate 

control of blood sugar.





Time Spent Managing Pts Care (In Minutes): 55

## 2021-02-02 RX ADMIN — TAMSULOSIN HYDROCHLORIDE SCH MG: 0.4 CAPSULE ORAL at 20:45

## 2021-02-02 RX ADMIN — METHYLPREDNISOLONE SODIUM SUCCINATE SCH MG: 125 INJECTION, POWDER, FOR SOLUTION INTRAMUSCULAR; INTRAVENOUS at 13:40

## 2021-02-02 RX ADMIN — METHYLPREDNISOLONE SODIUM SUCCINATE SCH MG: 125 INJECTION, POWDER, FOR SOLUTION INTRAMUSCULAR; INTRAVENOUS at 08:51

## 2021-02-02 RX ADMIN — CLOPIDOGREL BISULFATE SCH MG: 75 TABLET, FILM COATED ORAL at 08:58

## 2021-02-02 RX ADMIN — Medication SCH PATCH: at 08:50

## 2021-02-02 RX ADMIN — Medication SCH ML: at 09:04

## 2021-02-02 RX ADMIN — ATORVASTATIN CALCIUM SCH MG: 10 TABLET, FILM COATED ORAL at 20:46

## 2021-02-02 RX ADMIN — Medication SCH MG: at 20:46

## 2021-02-02 RX ADMIN — Medication SCH ML: at 20:47

## 2021-02-02 RX ADMIN — PANTOPRAZOLE SODIUM SCH MG: 40 TABLET, DELAYED RELEASE ORAL at 16:18

## 2021-02-02 RX ADMIN — ACETAMINOPHEN PRN MG: 500 TABLET, FILM COATED ORAL at 15:24

## 2021-02-02 RX ADMIN — GLIPIZIDE SCH MG: 5 TABLET, FILM COATED, EXTENDED RELEASE ORAL at 08:00

## 2021-02-02 RX ADMIN — METHYLPREDNISOLONE SODIUM SUCCINATE SCH MG: 125 INJECTION, POWDER, FOR SOLUTION INTRAMUSCULAR; INTRAVENOUS at 20:46

## 2021-02-02 RX ADMIN — QUETIAPINE SCH MG: 25 TABLET ORAL at 20:46

## 2021-02-02 RX ADMIN — GLIPIZIDE SCH MG: 5 TABLET, FILM COATED, EXTENDED RELEASE ORAL at 16:18

## 2021-02-02 RX ADMIN — PANTOPRAZOLE SODIUM SCH MG: 40 TABLET, DELAYED RELEASE ORAL at 08:57

## 2021-02-02 RX ADMIN — Medication SCH MG: at 20:45

## 2021-02-02 RX ADMIN — Medication SCH ML: at 08:53

## 2021-02-02 RX ADMIN — AMLODIPINE BESYLATE SCH MG: 10 TABLET ORAL at 09:02

## 2021-02-02 RX ADMIN — Medication SCH MG: at 13:40

## 2021-02-02 RX ADMIN — CHLORTHALIDONE SCH MG: 25 TABLET ORAL at 09:01

## 2021-02-02 RX ADMIN — QUETIAPINE SCH MG: 25 TABLET ORAL at 09:00

## 2021-02-02 RX ADMIN — ENOXAPARIN SODIUM SCH MG: 40 INJECTION SUBCUTANEOUS at 20:46

## 2021-02-02 NOTE — P.PN
Subjective


Date of Service: 02/02/21


Chief Complaint: Altered mental status, weakness, UTI


Subjective: Other (Patient more alert today.  Patient with dementia)





Physical Examination





- Vital Signs


Temperature: 96.7 F


Blood Pressure: 144/66


Pulse: 56


Respirations: 18


Pulse Ox (%): 90





- Physical Exam


General: Alert, Demented, Other (More alert today)


Neck: Supple


Respiratory: Other (On non-rebreather.  Without significant distress)


Cardiovascular: Normal pulses, Regular rate/rhythm


Neurological: Normal speech, Normal strength at 5/5 x4 extr, Normal tone, 

Dementia





- Studies


Medications List Reviewed: Yes





Assessment & Plan


Discharge Plan: Home


Plan to discharge in: Greater than 2 days


Physician Review Additional Text: 





Impression:


Acute respiratory failure with hypoxia secondary to bilateral COVID 19 pneumonia


Alzheimer dementia


HTN


DM Type 2 


BPH


Hyperlipidemia





Plan: 


Acute respiratory failure with hypoxia secondary to bilateral COVID 19 

pneumonia:  Slow improvement noted. Patient more alert today.  Patient on non-

rebreather.  Will have respiratory try to wean off and trial with nasal cannula.

 Continue with IV steroids and supplementation.  Patient with underlying 

dementia but stable.  Encourage incentive spirometer.  Will discuss with 

respiratory and pulmonology as well.  Will discuss with family about plan of 

care.


Alzheimer dementia:  Overall stable.  Continue with mood stabilizers.


HTN:  Continue medication


DM Type 2:  Continue Accu-Cheks.  Continue to adjust medication.  Maintain 

adequate control of blood sugar.


BPH:  Continue medication


Hyperlipidemia: continue medication


Time Spent Managing Pts Care (In Minutes): 55

## 2021-02-03 LAB
CRP SERPL-MCNC: 25.4 MG/L (ref ?–3)
FERRITIN SERPL-MCNC: 928.3 NG/ML (ref 26–388)

## 2021-02-03 RX ADMIN — Medication SCH ML: at 08:33

## 2021-02-03 RX ADMIN — PANTOPRAZOLE SODIUM SCH MG: 40 TABLET, DELAYED RELEASE ORAL at 08:36

## 2021-02-03 RX ADMIN — Medication SCH MG: at 20:54

## 2021-02-03 RX ADMIN — Medication SCH MG: at 20:55

## 2021-02-03 RX ADMIN — ENOXAPARIN SODIUM SCH MG: 40 INJECTION SUBCUTANEOUS at 20:53

## 2021-02-03 RX ADMIN — Medication SCH ML: at 08:39

## 2021-02-03 RX ADMIN — METHYLPREDNISOLONE SODIUM SUCCINATE SCH MG: 125 INJECTION, POWDER, FOR SOLUTION INTRAMUSCULAR; INTRAVENOUS at 08:36

## 2021-02-03 RX ADMIN — TAMSULOSIN HYDROCHLORIDE SCH MG: 0.4 CAPSULE ORAL at 20:54

## 2021-02-03 RX ADMIN — Medication SCH MG: at 14:04

## 2021-02-03 RX ADMIN — PANTOPRAZOLE SODIUM SCH MG: 40 TABLET, DELAYED RELEASE ORAL at 16:11

## 2021-02-03 RX ADMIN — GLIPIZIDE SCH MG: 5 TABLET, FILM COATED, EXTENDED RELEASE ORAL at 16:11

## 2021-02-03 RX ADMIN — QUETIAPINE SCH MG: 25 TABLET ORAL at 20:54

## 2021-02-03 RX ADMIN — Medication SCH PATCH: at 08:33

## 2021-02-03 RX ADMIN — QUETIAPINE SCH MG: 25 TABLET ORAL at 08:37

## 2021-02-03 RX ADMIN — ZINC SULFATE CAP 220 MG (50 MG ELEMENTAL ZN) SCH MG: 220 (50 ZN) CAP at 08:37

## 2021-02-03 RX ADMIN — Medication SCH ML: at 20:58

## 2021-02-03 RX ADMIN — METHYLPREDNISOLONE SODIUM SUCCINATE SCH MG: 125 INJECTION, POWDER, FOR SOLUTION INTRAMUSCULAR; INTRAVENOUS at 14:04

## 2021-02-03 RX ADMIN — Medication SCH MG: at 20:53

## 2021-02-03 RX ADMIN — GLIPIZIDE SCH MG: 5 TABLET, FILM COATED, EXTENDED RELEASE ORAL at 08:37

## 2021-02-03 RX ADMIN — Medication SCH ML: at 22:37

## 2021-02-03 RX ADMIN — FOLIC ACID SCH MG: 1 TABLET ORAL at 08:33

## 2021-02-03 RX ADMIN — ATORVASTATIN CALCIUM SCH MG: 10 TABLET, FILM COATED ORAL at 20:54

## 2021-02-03 RX ADMIN — AMLODIPINE BESYLATE SCH MG: 10 TABLET ORAL at 08:33

## 2021-02-03 RX ADMIN — Medication SCH MG: at 08:33

## 2021-02-03 RX ADMIN — CLOPIDOGREL BISULFATE SCH MG: 75 TABLET, FILM COATED ORAL at 08:37

## 2021-02-03 RX ADMIN — Medication SCH MG: at 08:37

## 2021-02-03 RX ADMIN — CHOLECALCIFEROL TAB 25 MCG (1000 UNIT) SCH UNIT: 25 TAB at 08:33

## 2021-02-03 RX ADMIN — CHLORTHALIDONE SCH MG: 25 TABLET ORAL at 08:38

## 2021-02-03 RX ADMIN — METHYLPREDNISOLONE SODIUM SUCCINATE SCH MG: 125 INJECTION, POWDER, FOR SOLUTION INTRAMUSCULAR; INTRAVENOUS at 20:55

## 2021-02-03 NOTE — RAD REPORT
EXAM DESCRIPTION:  RAD - Chest Single View - 2/3/2021 6:45 am

 

CLINICAL HISTORY:  follow up COVID

Chest pain.

 

COMPARISON:  Chest Single View dated 1/23/2021; Chest Single View dated 1/20/2021; Chest Single View 
dated 7/30/2019; Chest Single View dated 7/29/2019

 

FINDINGS:  Portable technique limits examination quality.

 

Moderate bilateral pulmonary opacities are noted, slightly worse on the left. The findings appear mod
erately worse since comparative study. The heart is moderately enlarged. No displaced fractures.

 

IMPRESSION:  Moderate worsening in lung aeration is seen since comparative study.

## 2021-02-03 NOTE — P.PN
Subjective


Date of Service: 02/03/21


Chief Complaint: Altered mental status, weakness, UTI


Subjective: Demented (Still requiring non-rebreather), Other (Patient more 

alert.)





Physical Examination





- Vital Signs


Temperature: 97.6 F


Blood Pressure: 165/76


Pulse: 58


Respirations: 22


Pulse Ox (%): 95





- Physical Exam


General: Alert, Demented


HEENT: Atraumatic


Neck: Supple


Respiratory: Other (Patient on non-rebreather.  No significant respiratory 

distress noted)


Cardiovascular: Normal pulses


Neurological: Normal speech, Normal strength at 5/5 x4 extr, Normal tone, Anthony

ntia





- Studies


Medications List Reviewed: Yes





Assessment & Plan


Discharge Plan: Home


Plan to discharge in: 72 Hours


Physician Review Additional Text: 





Impression:


Acute respiratory failure with hypoxia secondary to bilateral COVID 19 pneumonia


Alzheimer dementia


HTN


DM Type 2 


BPH


Hyperlipidemia





Plan: 


Acute respiratory failure with hypoxia secondary to bilateral COVID 19 

pneumonia:  Slow improvement noted. Patient continues to be more alert.  Will 

have respiratory try to wean off non-rebreather and trial on nasal cannula.  

Physical therapy ordered.  Encourage incentive spirometer.  Will discuss with 

family about discharge planning.  Continue with IV steroids and supplementation.




Alzheimer dementia:  Overall stable.  Continue with mood stabilizers.


HTN:  Continue medication


DM Type 2:  Continue Accu-Cheks.  Continue to adjust medication.  Maintain 

adequate control of blood sugar.


BPH:  Continue medication


Hyperlipidemia: continue medication


Time Spent Managing Pts Care (In Minutes): 55

## 2021-02-04 LAB
CRP SERPL-MCNC: 15.5 MG/L (ref ?–3)
FERRITIN SERPL-MCNC: 793.3 NG/ML (ref 26–388)

## 2021-02-04 RX ADMIN — PANTOPRAZOLE SODIUM SCH MG: 40 TABLET, DELAYED RELEASE ORAL at 16:50

## 2021-02-04 RX ADMIN — Medication SCH PATCH: at 09:48

## 2021-02-04 RX ADMIN — Medication SCH MG: at 20:56

## 2021-02-04 RX ADMIN — METHYLPREDNISOLONE SODIUM SUCCINATE SCH MG: 125 INJECTION, POWDER, FOR SOLUTION INTRAMUSCULAR; INTRAVENOUS at 21:01

## 2021-02-04 RX ADMIN — QUETIAPINE SCH MG: 25 TABLET ORAL at 20:53

## 2021-02-04 RX ADMIN — ATORVASTATIN CALCIUM SCH MG: 10 TABLET, FILM COATED ORAL at 20:55

## 2021-02-04 RX ADMIN — CHLORTHALIDONE SCH MG: 25 TABLET ORAL at 09:48

## 2021-02-04 RX ADMIN — GLIPIZIDE SCH MG: 5 TABLET, FILM COATED, EXTENDED RELEASE ORAL at 16:50

## 2021-02-04 RX ADMIN — METHYLPREDNISOLONE SODIUM SUCCINATE SCH MG: 125 INJECTION, POWDER, FOR SOLUTION INTRAMUSCULAR; INTRAVENOUS at 09:49

## 2021-02-04 RX ADMIN — CHOLECALCIFEROL TAB 25 MCG (1000 UNIT) SCH UNIT: 25 TAB at 09:48

## 2021-02-04 RX ADMIN — Medication SCH ML: at 21:00

## 2021-02-04 RX ADMIN — Medication SCH MG: at 13:38

## 2021-02-04 RX ADMIN — PANTOPRAZOLE SODIUM SCH MG: 40 TABLET, DELAYED RELEASE ORAL at 09:50

## 2021-02-04 RX ADMIN — Medication SCH MG: at 09:50

## 2021-02-04 RX ADMIN — Medication SCH MG: at 20:55

## 2021-02-04 RX ADMIN — CLOPIDOGREL BISULFATE SCH MG: 75 TABLET, FILM COATED ORAL at 09:49

## 2021-02-04 RX ADMIN — GLIPIZIDE SCH MG: 5 TABLET, FILM COATED, EXTENDED RELEASE ORAL at 09:49

## 2021-02-04 RX ADMIN — FOLIC ACID SCH MG: 1 TABLET ORAL at 09:50

## 2021-02-04 RX ADMIN — TAMSULOSIN HYDROCHLORIDE SCH MG: 0.4 CAPSULE ORAL at 20:56

## 2021-02-04 RX ADMIN — Medication SCH MG: at 09:49

## 2021-02-04 RX ADMIN — Medication SCH ML: at 09:51

## 2021-02-04 RX ADMIN — QUETIAPINE SCH MG: 25 TABLET ORAL at 09:54

## 2021-02-04 RX ADMIN — ZINC SULFATE CAP 220 MG (50 MG ELEMENTAL ZN) SCH MG: 220 (50 ZN) CAP at 09:49

## 2021-02-04 RX ADMIN — METHYLPREDNISOLONE SODIUM SUCCINATE SCH MG: 125 INJECTION, POWDER, FOR SOLUTION INTRAMUSCULAR; INTRAVENOUS at 13:38

## 2021-02-04 RX ADMIN — Medication SCH ML: at 09:00

## 2021-02-04 RX ADMIN — ENOXAPARIN SODIUM SCH MG: 40 INJECTION SUBCUTANEOUS at 20:53

## 2021-02-04 RX ADMIN — Medication SCH ML: at 20:53

## 2021-02-04 RX ADMIN — AMLODIPINE BESYLATE SCH MG: 10 TABLET ORAL at 09:50

## 2021-02-04 NOTE — P.PN
Subjective


Date of Service: 02/04/21


Chief Complaint: Altered mental status, weakness, UTI


Subjective: Other (Stable. More activity noted. On NRB.)





Physical Examination





- Vital Signs


Temperature: 97.4 F


Blood Pressure: 149/73


Pulse: 63


Respirations: 24


Pulse Ox (%): 91





- Studies


Medications List Reviewed: Yes





Assessment & Plan


Discharge Plan: Home


Plan to discharge in: 72 Hours


Physician Review Additional Text: 








Physical Exam: 


Patient is alert. Dementia noted. 


Heart: normal rythym


Lung: he is nonrebreather.


GI: soft, nontender, nondistended


EXT: good range of motion. 





Impression:


Acute respiratory failure with hypoxia secondary to bilateral COVID 19 pneumonia


Alzheimer dementia


HTN


DM Type 2 


BPH


Hyperlipidemia





Plan: 


Acute respiratory failure with hypoxia secondary to bilateral COVID 19 

pneumonia:  Slow improvement noted. Patient continues to be more alert.  Will 

have respiratory try to wean off non-rebreather and trial on nasal cannula.  

Physical therapy ordered.  Encourage incentive spirometer.  Discussed with 

family. Family wants for patient to return back at discharge.  Continue with IV 

steroids and supplementation. Anticipate home in the next 72 hours.


Alzheimer dementia:  Overall stable.  Continue with mood stabilizers.


HTN:  Continue medication


DM Type 2:  Continue Accu-Cheks.  Continue to adjust medication.  Maintain 

adequate control of blood sugar.


BPH:  Continue medication


Hyperlipidemia: continue medication


Time Spent Managing Pts Care (In Minutes): 55

## 2021-02-05 LAB
BUN BLD-MCNC: 63 MG/DL (ref 7–18)
CRP SERPL-MCNC: 10.5 MG/L (ref ?–3)
FERRITIN SERPL-MCNC: 675.2 NG/ML (ref 26–388)
GLUCOSE SERPLBLD-MCNC: 174 MG/DL (ref 74–106)
POTASSIUM SERPL-SCNC: 4.4 MMOL/L (ref 3.5–5.1)

## 2021-02-05 RX ADMIN — Medication SCH ML: at 20:09

## 2021-02-05 RX ADMIN — Medication SCH MG: at 20:09

## 2021-02-05 RX ADMIN — Medication SCH ML: at 08:41

## 2021-02-05 RX ADMIN — AMLODIPINE BESYLATE SCH MG: 10 TABLET ORAL at 08:39

## 2021-02-05 RX ADMIN — GLIPIZIDE SCH MG: 5 TABLET, FILM COATED, EXTENDED RELEASE ORAL at 16:28

## 2021-02-05 RX ADMIN — ZINC SULFATE CAP 220 MG (50 MG ELEMENTAL ZN) SCH MG: 220 (50 ZN) CAP at 08:42

## 2021-02-05 RX ADMIN — Medication SCH MG: at 14:21

## 2021-02-05 RX ADMIN — Medication SCH PATCH: at 08:41

## 2021-02-05 RX ADMIN — CLOPIDOGREL BISULFATE SCH MG: 75 TABLET, FILM COATED ORAL at 08:40

## 2021-02-05 RX ADMIN — FOLIC ACID SCH MG: 1 TABLET ORAL at 08:34

## 2021-02-05 RX ADMIN — CHLORTHALIDONE SCH MG: 25 TABLET ORAL at 08:40

## 2021-02-05 RX ADMIN — QUETIAPINE SCH MG: 25 TABLET ORAL at 08:39

## 2021-02-05 RX ADMIN — APIXABAN SCH MG: 5 TABLET, FILM COATED ORAL at 20:09

## 2021-02-05 RX ADMIN — PANTOPRAZOLE SODIUM SCH MG: 40 TABLET, DELAYED RELEASE ORAL at 08:40

## 2021-02-05 RX ADMIN — PANTOPRAZOLE SODIUM SCH MG: 40 TABLET, DELAYED RELEASE ORAL at 16:28

## 2021-02-05 RX ADMIN — Medication SCH ML: at 08:42

## 2021-02-05 RX ADMIN — TAMSULOSIN HYDROCHLORIDE SCH MG: 0.4 CAPSULE ORAL at 20:09

## 2021-02-05 RX ADMIN — Medication SCH MG: at 08:34

## 2021-02-05 RX ADMIN — ATORVASTATIN CALCIUM SCH MG: 10 TABLET, FILM COATED ORAL at 20:09

## 2021-02-05 RX ADMIN — ALBUMIN (HUMAN) SCH MG: 5 SOLUTION INTRAVENOUS at 12:00

## 2021-02-05 RX ADMIN — METHYLPREDNISOLONE SODIUM SUCCINATE SCH MG: 125 INJECTION, POWDER, FOR SOLUTION INTRAMUSCULAR; INTRAVENOUS at 20:08

## 2021-02-05 RX ADMIN — METHYLPREDNISOLONE SODIUM SUCCINATE SCH MG: 125 INJECTION, POWDER, FOR SOLUTION INTRAMUSCULAR; INTRAVENOUS at 08:41

## 2021-02-05 RX ADMIN — CHOLECALCIFEROL TAB 25 MCG (1000 UNIT) SCH UNIT: 25 TAB at 08:34

## 2021-02-05 RX ADMIN — GLIPIZIDE SCH MG: 5 TABLET, FILM COATED, EXTENDED RELEASE ORAL at 08:34

## 2021-02-05 RX ADMIN — QUETIAPINE SCH MG: 25 TABLET ORAL at 20:09

## 2021-02-05 NOTE — P.PN
Subjective


Date of Service: 02/05/21


Chief Complaint: Respiratory failure from corona virus





Physical Examination





- Vital Signs


Temperature: 97.2 F


Blood Pressure: 164/77


Pulse: 59


Respirations: 16


Pulse Ox (%): 96





- Studies


Medications List Reviewed: Yes





Assessment & Plan


Physician Review Additional Text: 








Physical Exam: 


Patient is alert. Dementia noted. 


Heart: normal rythym


Lung: he is nonrebreather.


GI: soft, nontender, nondistended


EXT: good range of motion. 





Impression:


Acute respiratory failure with hypoxia secondary to bilateral COVID 19 pneumonia


Alzheimer dementia


HTN


DM Type 2 


BPH


Hyperlipidemia





Plan: 


Acute respiratory failure with hypoxia secondary to bilateral COVID 19 

pneumonia:  Slow improvement noted. Patient continues to be more alert.  Will 

have respiratory try to wean off non-rebreather and trial on nasal cannula.  

Physical therapy ordered.  Encourage incentive spirometer.  Discussed with 

family. Family wants for patient to return back at discharge.  Continue with IV 

steroids and supplementation. Anticipate home in the next 72 hours. Family 

requests repeat COVID.


Alzheimer dementia:  Overall stable.  Continue with mood stabilizers.


HTN:  Continue medication


DM Type 2:  Continue Accu-Cheks.  Continue to adjust medication.  Maintain 

adequate control of blood sugar.


BPH:  Continue medication


Hyperlipidemia: continue medication


Time Spent Managing Pts Care (In Minutes): 55

## 2021-02-05 NOTE — P.CNS
Date of Consult: 02/05/21


Reason for Consult: Respiratory failure from coronal wire


Chief Complaint: Respiratory failure from corona virus


History of Present Illness: 


Patient is 87 years of age multiple medical problems admitted with altered 

mental status he has been hypotensive hypoxic he still requiring a lot of oxygen

his corona virus positive very alert responsive cooperative


Allergies





No Known Allergies Allergy (Verified 01/20/21 22:36)


   





Home Medications: 








Amlodipine [Norvasc] 10 mg PO DAILY 01/21/21 


Carvedilol [Coreg] 12.5 mg PO DAILY 01/21/21 


Chlorthalidone [Hygroton 25mg Tab] 25 mg PO DAILY 01/21/21 


Clopidogrel Bisulfate [Plavix] 75 mg PO DAILY 01/21/21 


Glipizide [Glipizide ER] 5 mg PO BID 01/21/21 


Lovastatin 20 mg PO DAILY 01/21/21 


Quetiapine Fumarate [Seroquel] 25 mg PO BID 01/21/21 


Tamsulosin [Flomax] 0.4 mg PO BEDTIME 01/21/21 








- Past Medical/Surgical History


Diabetic: Yes


-: HTN


-: DM Type 2


-: GERD


-: HYPERLIPIDEMIA


-: BPH


-: KIDNEY PROBLEMS


-: dementia


-: KNEE JARRETT


-: APPENDECTOMY





- Family History


  ** Brother


Medical History: Heart disease, Hypertension





  ** Sister


Medical History: Heart disease, Hypertension





- Social History


Smoking Status: Unknown if ever smoked


Alcohol use: No


CD- Drugs: No


Caffeine use: No


Place of Residence: Home





Review of Systems


Respiratory: Shortness of Breath





Physical Examination














Temp Pulse Resp BP Pulse Ox


 


 97.5 F   72   17   178/74 H  97 


 


 02/05/21 08:00  02/05/21 08:40  02/05/21 08:00  02/05/21 08:40  02/05/21 08:00








General: Alert, Cooperative


Respiratory: Clear to auscultation bilaterally


Cardiovascular: No edema, Regular rate/rhythm





- Problems


(1) Acute respiratory failure due to severe acute respiratory syndrome 

coronavirus 2 (SARS-CoV-2) infection


Current Visit: Yes   Status: Acute   


Plan: 


Patient is 87 years of age admitted with respiratory failure from corona virus 

oxygen requirements are declining continue to wean down on the FiO2 white count 

declining kidney function is improving patient has had some ivermectin patient's

 cultures are negative patient has bilateral pneumonia recommend IV Lasix white 

count is declining reduce dose of Solu-Medrol

## 2021-02-06 RX ADMIN — ZINC SULFATE CAP 220 MG (50 MG ELEMENTAL ZN) SCH MG: 220 (50 ZN) CAP at 08:50

## 2021-02-06 RX ADMIN — AMLODIPINE BESYLATE SCH MG: 10 TABLET ORAL at 08:50

## 2021-02-06 RX ADMIN — Medication SCH MG: at 20:13

## 2021-02-06 RX ADMIN — Medication SCH MG: at 08:49

## 2021-02-06 RX ADMIN — ATORVASTATIN CALCIUM SCH MG: 10 TABLET, FILM COATED ORAL at 20:12

## 2021-02-06 RX ADMIN — CHOLECALCIFEROL TAB 25 MCG (1000 UNIT) SCH UNIT: 25 TAB at 08:49

## 2021-02-06 RX ADMIN — QUETIAPINE SCH MG: 25 TABLET ORAL at 08:50

## 2021-02-06 RX ADMIN — ALBUMIN (HUMAN) SCH MG: 5 SOLUTION INTRAVENOUS at 08:51

## 2021-02-06 RX ADMIN — APIXABAN SCH MG: 5 TABLET, FILM COATED ORAL at 08:49

## 2021-02-06 RX ADMIN — CLOPIDOGREL BISULFATE SCH MG: 75 TABLET, FILM COATED ORAL at 08:49

## 2021-02-06 RX ADMIN — PANTOPRAZOLE SODIUM SCH MG: 40 TABLET, DELAYED RELEASE ORAL at 08:49

## 2021-02-06 RX ADMIN — APIXABAN SCH MG: 5 TABLET, FILM COATED ORAL at 20:12

## 2021-02-06 RX ADMIN — Medication SCH MG: at 20:14

## 2021-02-06 RX ADMIN — GLIPIZIDE SCH MG: 5 TABLET, FILM COATED, EXTENDED RELEASE ORAL at 16:34

## 2021-02-06 RX ADMIN — Medication SCH ML: at 08:51

## 2021-02-06 RX ADMIN — PANTOPRAZOLE SODIUM SCH MG: 40 TABLET, DELAYED RELEASE ORAL at 16:34

## 2021-02-06 RX ADMIN — METHYLPREDNISOLONE SODIUM SUCCINATE SCH MG: 125 INJECTION, POWDER, FOR SOLUTION INTRAMUSCULAR; INTRAVENOUS at 20:13

## 2021-02-06 RX ADMIN — QUETIAPINE SCH MG: 25 TABLET ORAL at 20:14

## 2021-02-06 RX ADMIN — Medication SCH PATCH: at 08:48

## 2021-02-06 RX ADMIN — LACTULOSE PRN GM: 20 SOLUTION ORAL at 17:26

## 2021-02-06 RX ADMIN — Medication SCH MG: at 13:21

## 2021-02-06 RX ADMIN — TAMSULOSIN HYDROCHLORIDE SCH MG: 0.4 CAPSULE ORAL at 20:12

## 2021-02-06 RX ADMIN — Medication SCH ML: at 20:14

## 2021-02-06 RX ADMIN — Medication SCH ML: at 08:52

## 2021-02-06 RX ADMIN — Medication SCH MG: at 20:12

## 2021-02-06 RX ADMIN — FOLIC ACID SCH MG: 1 TABLET ORAL at 08:49

## 2021-02-06 RX ADMIN — GLIPIZIDE SCH MG: 5 TABLET, FILM COATED, EXTENDED RELEASE ORAL at 08:50

## 2021-02-06 RX ADMIN — Medication SCH ML: at 21:00

## 2021-02-06 RX ADMIN — METHYLPREDNISOLONE SODIUM SUCCINATE SCH MG: 125 INJECTION, POWDER, FOR SOLUTION INTRAMUSCULAR; INTRAVENOUS at 08:51

## 2021-02-06 NOTE — P.PN
Subjective


Date of Service: 02/06/21


Chief Complaint: Respiratory failure from corona virus


Subjective: Other (slow improvement noted. remains on nonrebreather.)





Physical Examination





- Vital Signs


Temperature: 97.9 F


Blood Pressure: 150/70


Pulse: 60


Respirations: 23


Pulse Ox (%): 93





- Studies


Medications List Reviewed: Yes





Assessment & Plan


Discharge Plan: Home


Plan to discharge in: Greater than 2 days


Physician Review Additional Text: 








Physical Exam: 


Patient is alert. Dementia noted. 


Heart: normal rythym


Lung: he is on nonrebreather.


GI: soft, nontender, nondistended


EXT: good range of motion. 





Impression:


Acute respiratory failure with hypoxia secondary to bilateral COVID 19 pneumonia


Alzheimer dementia


HTN


DM Type 2 


BPH


Hyperlipidemia





Plan: 


Acute respiratory failure with hypoxia secondary to bilateral COVID 19 

pneumonia:  Slow improvement noted. Patient continues to be more alert.  Will 

have respiratory continue to try to wean off non-rebreather and trial on nasal 

cannula.  Physical therapy ordered.  Encourage incentive spirometer. Will 

discuss with Pulmonary. Discussed with family. Family wants for patient to 

return back at discharge.  Continue with IV steroids and supplementation. 

Anticipate home in the next 72 hours. Family requests repeat COVID.


Alzheimer dementia:  Overall stable.  Continue with mood stabilizers.


HTN:  Continue medication


DM Type 2:  Continue Accu-Cheks.  Continue to adjust medication.  Maintain 

adequate control of blood sugar.


BPH:  Continue medication


Hyperlipidemia: continue medication


Time Spent Managing Pts Care (In Minutes): 55

## 2021-02-06 NOTE — P.PN
Subjective


Date of Service: 02/06/21


Chief Complaint: Respiratory failure from corona virus


Subjective: Improving (Patient is improving oxygen requirements are somewhat 

better although he is on high-flow and non-rebreather)





Review of Systems


General: Weakness


Respiratory: Shortness of Breath





Physical Examination





- Vital Signs


Temperature: 98.4 F


Blood Pressure: 158/78


Pulse: 61


Respirations: 20


Pulse Ox (%): 98





- Studies


Medications List Reviewed: Yes





Assessment & Plan





- Problems (Diagnosis)


(1) Acute respiratory failure due to severe acute respiratory syndrome coron

avirus 2 (SARS-CoV-2) infection


Current Visit: Yes   Status: Acute   


Plan: 


Respiratory failure continue to titrate oxygen down blood pressure mildly 

elevated continue with Lasix labs reviewed blood sugar is reasonably controlled

## 2021-02-07 LAB
BUN BLD-MCNC: 89 MG/DL (ref 7–18)
CRP SERPL-MCNC: 8.15 MG/L (ref ?–3)
FERRITIN SERPL-MCNC: 595 NG/ML (ref 26–388)
GLUCOSE SERPLBLD-MCNC: 163 MG/DL (ref 74–106)
HCT VFR BLD CALC: 37.6 % (ref 39.6–49)
LYMPHOCYTES # SPEC AUTO: 0.5 K/UL (ref 0.7–4.9)
MORPHOLOGY BLD-IMP: (no result)
PMV BLD: 9.6 FL (ref 7.6–11.3)
POTASSIUM SERPL-SCNC: 4.5 MMOL/L (ref 3.5–5.1)
RBC # BLD: 4.26 M/UL (ref 4.33–5.43)

## 2021-02-07 RX ADMIN — PANTOPRAZOLE SODIUM SCH MG: 40 TABLET, DELAYED RELEASE ORAL at 16:44

## 2021-02-07 RX ADMIN — METHYLPREDNISOLONE SODIUM SUCCINATE SCH MG: 125 INJECTION, POWDER, FOR SOLUTION INTRAMUSCULAR; INTRAVENOUS at 20:02

## 2021-02-07 RX ADMIN — PANTOPRAZOLE SODIUM SCH MG: 40 TABLET, DELAYED RELEASE ORAL at 08:59

## 2021-02-07 RX ADMIN — ZINC SULFATE CAP 220 MG (50 MG ELEMENTAL ZN) SCH MG: 220 (50 ZN) CAP at 09:00

## 2021-02-07 RX ADMIN — Medication SCH ML: at 20:01

## 2021-02-07 RX ADMIN — Medication SCH MG: at 20:01

## 2021-02-07 RX ADMIN — Medication SCH MG: at 20:00

## 2021-02-07 RX ADMIN — GLIPIZIDE SCH MG: 5 TABLET, FILM COATED, EXTENDED RELEASE ORAL at 09:00

## 2021-02-07 RX ADMIN — ALBUMIN (HUMAN) SCH MG: 5 SOLUTION INTRAVENOUS at 09:00

## 2021-02-07 RX ADMIN — APIXABAN SCH MG: 5 TABLET, FILM COATED ORAL at 09:05

## 2021-02-07 RX ADMIN — Medication SCH MG: at 09:00

## 2021-02-07 RX ADMIN — APIXABAN SCH MG: 5 TABLET, FILM COATED ORAL at 20:01

## 2021-02-07 RX ADMIN — QUETIAPINE SCH MG: 25 TABLET ORAL at 09:00

## 2021-02-07 RX ADMIN — CHOLECALCIFEROL TAB 25 MCG (1000 UNIT) SCH UNIT: 25 TAB at 09:00

## 2021-02-07 RX ADMIN — QUETIAPINE SCH MG: 25 TABLET ORAL at 20:00

## 2021-02-07 RX ADMIN — Medication SCH PATCH: at 09:00

## 2021-02-07 RX ADMIN — DOCUSATE SODIUM SCH MG: 100 CAPSULE, LIQUID FILLED ORAL at 08:59

## 2021-02-07 RX ADMIN — METHYLPREDNISOLONE SODIUM SUCCINATE SCH MG: 125 INJECTION, POWDER, FOR SOLUTION INTRAMUSCULAR; INTRAVENOUS at 08:59

## 2021-02-07 RX ADMIN — Medication SCH ML: at 09:01

## 2021-02-07 RX ADMIN — Medication SCH MG: at 13:22

## 2021-02-07 RX ADMIN — GLIPIZIDE SCH MG: 5 TABLET, FILM COATED, EXTENDED RELEASE ORAL at 16:43

## 2021-02-07 RX ADMIN — TAMSULOSIN HYDROCHLORIDE SCH MG: 0.4 CAPSULE ORAL at 20:00

## 2021-02-07 RX ADMIN — AMLODIPINE BESYLATE SCH MG: 10 TABLET ORAL at 09:07

## 2021-02-07 RX ADMIN — FOLIC ACID SCH MG: 1 TABLET ORAL at 08:59

## 2021-02-07 RX ADMIN — Medication SCH MG: at 08:59

## 2021-02-07 RX ADMIN — CLOPIDOGREL BISULFATE SCH MG: 75 TABLET, FILM COATED ORAL at 08:59

## 2021-02-07 RX ADMIN — ATORVASTATIN CALCIUM SCH MG: 10 TABLET, FILM COATED ORAL at 20:00

## 2021-02-07 NOTE — RAD REPORT
EXAM DESCRIPTION:  RAD - Chest Single View - 2/7/2021 7:20 am

 

CLINICAL HISTORY:  follow up covid

Chest pain.

 

COMPARISON:  Chest Single View dated 2/3/2021; Chest Single View dated 1/23/2021; Chest Single View d
ated 1/20/2021; Chest Single View dated 7/30/2019

 

FINDINGS:  Portable technique limits examination quality.

 

Extensive bilateral pulmonary opacities are again noted, mildly improved since the comparative study.
 The heart is mildly enlarged in size. No displaced fractures.

 

IMPRESSION:  Mild improvement lung aeration since comparative study.

## 2021-02-07 NOTE — P.PN
Subjective


Date of Service: 02/07/21


Chief Complaint: Respiratory failure from corona virus


Subjective: Doing well, Demented





Physical Examination





- Vital Signs


Temperature: 96.9 F


Blood Pressure: 168/82


Pulse: 73


Respirations: 23


Pulse Ox (%): 92





- Studies


Medications List Reviewed: Yes





Assessment & Plan


Discharge Plan: Home


Plan to discharge in: Greater than 2 days


Physician Review Additional Text: 





Initial chief complaint:


Shortness of breath secondary to COVID 19





Physical Exam: 


Patient is alert. Dementia noted. 


Heart: normal rythym


Lung: he is on nonrebreather.


GI: soft, nontender, nondistended


EXT: good range of motion. 





Impression:


Acute respiratory failure with hypoxia secondary to bilateral COVID 19 pneumonia


Alzheimer dementia


HTN


DM Type 2 


BPH


Hyperlipidemia





Plan: 


Acute respiratory failure with hypoxia secondary to bilateral COVID 19 

pneumonia:  Patient continues to show slow improvement.  Chest x-ray shows 

improvement.  Patient on BiPAP at 55% FiO2.  Respiratory to continue to wean 

off.  Patient had been on non-rebreather.  Plan is for high-flow then transition

to nasal cannula.  Pulmonology added Lasix. Physical therapy ordered.  Encourage

incentive spirometer. Family wants for patient to return back at discharge. 

Family does not desire for him to go to a skilled facility.  Continue with IV 

steroids and supplementation.  Anticipate home in the next 72 hr.  I will turn 

the service over to the hospitalist team tomorrow.  I will go plan of care with 

him.


Alzheimer dementia:  Overall stable.  Continue with mood stabilizers.


HTN:  Continue medication


DM Type 2:  Continue Accu-Cheks.  Continue to adjust medication.  Maintain 

adequate control of blood sugar.


BPH:  Continue medication


Hyperlipidemia: continue medication


Time Spent Managing Pts Care (In Minutes): 55

## 2021-02-08 RX ADMIN — Medication SCH MG: at 20:46

## 2021-02-08 RX ADMIN — CLOPIDOGREL BISULFATE SCH MG: 75 TABLET, FILM COATED ORAL at 09:00

## 2021-02-08 RX ADMIN — QUETIAPINE SCH MG: 25 TABLET ORAL at 20:46

## 2021-02-08 RX ADMIN — CHOLECALCIFEROL TAB 25 MCG (1000 UNIT) SCH UNIT: 25 TAB at 09:01

## 2021-02-08 RX ADMIN — Medication SCH MG: at 09:02

## 2021-02-08 RX ADMIN — APIXABAN SCH MG: 5 TABLET, FILM COATED ORAL at 09:02

## 2021-02-08 RX ADMIN — PANTOPRAZOLE SODIUM SCH MG: 40 TABLET, DELAYED RELEASE ORAL at 17:15

## 2021-02-08 RX ADMIN — QUETIAPINE SCH MG: 25 TABLET ORAL at 09:00

## 2021-02-08 RX ADMIN — METHYLPREDNISOLONE SODIUM SUCCINATE SCH MG: 125 INJECTION, POWDER, FOR SOLUTION INTRAMUSCULAR; INTRAVENOUS at 20:47

## 2021-02-08 RX ADMIN — PANTOPRAZOLE SODIUM SCH MG: 40 TABLET, DELAYED RELEASE ORAL at 09:00

## 2021-02-08 RX ADMIN — AMLODIPINE BESYLATE SCH MG: 10 TABLET ORAL at 09:01

## 2021-02-08 RX ADMIN — ALBUMIN (HUMAN) SCH MG: 5 SOLUTION INTRAVENOUS at 09:02

## 2021-02-08 RX ADMIN — FOLIC ACID SCH MG: 1 TABLET ORAL at 09:01

## 2021-02-08 RX ADMIN — TAMSULOSIN HYDROCHLORIDE SCH MG: 0.4 CAPSULE ORAL at 20:46

## 2021-02-08 RX ADMIN — Medication SCH MG: at 13:15

## 2021-02-08 RX ADMIN — Medication SCH ML: at 09:03

## 2021-02-08 RX ADMIN — GLIPIZIDE SCH MG: 5 TABLET, FILM COATED, EXTENDED RELEASE ORAL at 09:07

## 2021-02-08 RX ADMIN — Medication SCH ML: at 20:47

## 2021-02-08 RX ADMIN — ATORVASTATIN CALCIUM SCH MG: 10 TABLET, FILM COATED ORAL at 20:46

## 2021-02-08 RX ADMIN — METHYLPREDNISOLONE SODIUM SUCCINATE SCH MG: 125 INJECTION, POWDER, FOR SOLUTION INTRAMUSCULAR; INTRAVENOUS at 09:02

## 2021-02-08 RX ADMIN — APIXABAN SCH MG: 5 TABLET, FILM COATED ORAL at 20:46

## 2021-02-08 RX ADMIN — Medication SCH PATCH: at 09:03

## 2021-02-08 RX ADMIN — ZINC SULFATE CAP 220 MG (50 MG ELEMENTAL ZN) SCH MG: 220 (50 ZN) CAP at 09:01

## 2021-02-08 RX ADMIN — Medication SCH MG: at 09:01

## 2021-02-08 RX ADMIN — Medication SCH ML: at 09:02

## 2021-02-08 RX ADMIN — DOCUSATE SODIUM SCH MG: 100 CAPSULE, LIQUID FILLED ORAL at 09:00

## 2021-02-08 RX ADMIN — ACETAMINOPHEN PRN MG: 500 TABLET, FILM COATED ORAL at 20:45

## 2021-02-08 NOTE — P.PN
Subjective


Date of Service: 02/08/21


Chief Complaint: Respiratory failure from corona virus





Patient is still requiring high concentrations of oxygen is currently on BiPAP 

feeling very weak short of breath





Review of Systems


General: Weakness


Respiratory: Shortness of Breath





Physical Examination





- Vital Signs


Temperature: 96.9 F


Blood Pressure: 140/68


Pulse: 74


Respirations: 18


Pulse Ox (%): 91





- Physical Exam


General: Alert, Moderate distress





- Studies


Medications List Reviewed: Yes





Assessment & Plan





- Problems (Diagnosis)


(1) Acute respiratory failure due to severe acute respiratory syndrome 

coronavirus 2 (SARS-CoV-2) infection


Current Visit: Yes   Status: Acute   


Plan: 


Respiratory failure from corona virus patient's renal function is worse white 

count is also elevated cultures are negative start on some IV fluids very poor 

appetite patient is not eating well start on tube feeds patient has interstitial

changes on chest x-ray





Physician Review Additional Text: 





Initial chief complaint:


Shortness of breath secondary to COVID 19





Physical Exam: 


Patient is alert. Dementia noted. 


Heart: normal rythym


Lung: he is on nonrebreather.


GI: soft, nontender, nondistended


EXT: good range of motion. 





Impression:


Acute respiratory failure with hypoxia secondary to bilateral COVID 19 pneumonia


Alzheimer dementia


HTN


DM Type 2 


BPH


Hyperlipidemia





Plan: 


Acute respiratory failure with hypoxia secondary to bilateral COVID 19 

pneumonia:  Patient continues to show slow improvement.  Chest x-ray shows 

improvement.  Patient on BiPAP at 55% FiO2.  Respiratory to continue to wean 

off.  Patient had been on non-rebreather.  Plan is for high-flow then transition

to nasal cannula.  Pulmonology added Lasix. Physical therapy ordered.  Encourage

incentive spirometer. Family wants for patient to return back at discharge. 

Family does not desire for him to go to a skilled facility.  Continue with IV 

steroids and supplementation.  Anticipate home in the next 72 hr.  I will turn 

the service over to the hospitalist team tomorrow.  I will go plan of care with 

him.


Alzheimer dementia:  Overall stable.  Continue with mood stabilizers.


HTN:  Continue medication


DM Type 2:  Continue Accu-Cheks.  Continue to adjust medication.  Maintain 

adequate control of blood sugar.


BPH:  Continue medication


Hyperlipidemia: continue medication

## 2021-02-09 LAB
BUN BLD-MCNC: 114 MG/DL (ref 7–18)
GLUCOSE SERPLBLD-MCNC: 228 MG/DL (ref 74–106)
HCT VFR BLD CALC: 30.6 % (ref 39.6–49)
HCT VFR BLD CALC: 32.9 % (ref 39.6–49)
PMV BLD: 9.7 FL (ref 7.6–11.3)
POTASSIUM SERPL-SCNC: 4.1 MMOL/L (ref 3.5–5.1)
RBC # BLD: 3.82 M/UL (ref 4.33–5.43)

## 2021-02-09 RX ADMIN — CLOPIDOGREL BISULFATE SCH MG: 75 TABLET, FILM COATED ORAL at 08:28

## 2021-02-09 RX ADMIN — PANTOPRAZOLE SODIUM SCH MG: 40 TABLET, DELAYED RELEASE ORAL at 16:24

## 2021-02-09 RX ADMIN — Medication SCH MG: at 20:24

## 2021-02-09 RX ADMIN — SODIUM CHLORIDE SCH MLS: 0.45 INJECTION, SOLUTION INTRAVENOUS at 12:58

## 2021-02-09 RX ADMIN — Medication SCH MG: at 08:29

## 2021-02-09 RX ADMIN — Medication SCH ML: at 16:31

## 2021-02-09 RX ADMIN — TAMSULOSIN HYDROCHLORIDE SCH MG: 0.4 CAPSULE ORAL at 20:24

## 2021-02-09 RX ADMIN — Medication SCH ML: at 08:29

## 2021-02-09 RX ADMIN — METHYLPREDNISOLONE SODIUM SUCCINATE SCH MG: 125 INJECTION, POWDER, FOR SOLUTION INTRAMUSCULAR; INTRAVENOUS at 20:25

## 2021-02-09 RX ADMIN — Medication SCH PATCH: at 08:26

## 2021-02-09 RX ADMIN — QUETIAPINE SCH MG: 25 TABLET ORAL at 08:28

## 2021-02-09 RX ADMIN — Medication SCH ML: at 08:00

## 2021-02-09 RX ADMIN — ATORVASTATIN CALCIUM SCH MG: 10 TABLET, FILM COATED ORAL at 20:24

## 2021-02-09 RX ADMIN — PANTOPRAZOLE SODIUM SCH MG: 40 TABLET, DELAYED RELEASE ORAL at 08:28

## 2021-02-09 RX ADMIN — Medication SCH MG: at 16:23

## 2021-02-09 RX ADMIN — DOCUSATE SODIUM SCH MG: 100 CAPSULE, LIQUID FILLED ORAL at 08:29

## 2021-02-09 RX ADMIN — ACETAMINOPHEN PRN MG: 500 TABLET, FILM COATED ORAL at 20:24

## 2021-02-09 RX ADMIN — QUETIAPINE SCH MG: 25 TABLET ORAL at 20:24

## 2021-02-09 RX ADMIN — CHOLECALCIFEROL TAB 25 MCG (1000 UNIT) SCH UNIT: 25 TAB at 08:27

## 2021-02-09 RX ADMIN — FLUCONAZOLE SCH MG: 100 TABLET ORAL at 13:57

## 2021-02-09 RX ADMIN — Medication SCH ML: at 20:23

## 2021-02-09 RX ADMIN — METHYLPREDNISOLONE SODIUM SUCCINATE SCH MG: 125 INJECTION, POWDER, FOR SOLUTION INTRAMUSCULAR; INTRAVENOUS at 08:28

## 2021-02-09 RX ADMIN — FOLIC ACID SCH MG: 1 TABLET ORAL at 08:28

## 2021-02-09 RX ADMIN — AMLODIPINE BESYLATE SCH MG: 10 TABLET ORAL at 08:27

## 2021-02-09 RX ADMIN — APIXABAN SCH MG: 5 TABLET, FILM COATED ORAL at 08:29

## 2021-02-09 RX ADMIN — ZINC SULFATE CAP 220 MG (50 MG ELEMENTAL ZN) SCH MG: 220 (50 ZN) CAP at 08:27

## 2021-02-09 RX ADMIN — Medication SCH ML: at 12:57

## 2021-02-09 RX ADMIN — Medication SCH MG: at 08:28

## 2021-02-09 RX ADMIN — ALBUMIN (HUMAN) SCH MG: 5 SOLUTION INTRAVENOUS at 08:27

## 2021-02-09 NOTE — P.PN
Subjective


Date of Service: 02/09/21


Chief Complaint: Respiratory failure from corona virus


Not doing well his kidney function is worse still very hypoxic agitated





Review of Systems


General: Weakness


Respiratory: Cough, Shortness of Breath





Physical Examination





- Vital Signs


Temperature: 97 F


Blood Pressure: 164/78


Pulse: 84


Respirations: 18


Pulse Ox (%): 95





- Studies


Medications List Reviewed: Yes





Assessment & Plan





- Problems (Diagnosis)


(1) Acute respiratory failure due to severe acute respiratory syndrome 

coronavirus 2 (SARS-CoV-2) infection


Current Visit: Yes   Status: Acute   


Plan: 


Respiratory failure kidney function is worse Dc Lasix start on half-normal 

saline abdominal CT did not show any hydronephrosis white count elevated start 

on IV antibiotics Rocephin Diflucan for fungal prophylaxis





Physician Review Additional Text: 





Initial chief complaint:


Shortness of breath secondary to COVID 19





Physical Exam: 


Patient is alert. Dementia noted. 


Heart: normal rythym


Lung: he is on nonrebreather.


GI: soft, nontender, nondistended


EXT: good range of motion. 





Impression:


Acute respiratory failure with hypoxia secondary to bilateral COVID 19 pneumonia


Alzheimer dementia


HTN


DM Type 2 


BPH


Hyperlipidemia





Plan: 


Acute respiratory failure with hypoxia secondary to bilateral COVID 19 

pneumonia:  Patient continues to show slow improvement.  Chest x-ray shows impr

ovement.  Patient on BiPAP at 55% FiO2.  Respiratory to continue to wean off.  

Patient had been on non-rebreather.  Plan is for high-flow then transition to 

nasal cannula.  Pulmonology added Lasix. Physical therapy ordered.  Encourage 

incentive spirometer. Family wants for patient to return back at discharge. 

Family does not desire for him to go to a skilled facility.  Continue with IV 

steroids and supplementation.  Anticipate home in the next 72 hr.  I will turn 

the service over to the hospitalist team tomorrow.  I will go plan of care with 

him.


Alzheimer dementia:  Overall stable.  Continue with mood stabilizers.


HTN:  Continue medication


DM Type 2:  Continue Accu-Cheks.  Continue to adjust medication.  Maintain a

dequate control of blood sugar.


BPH:  Continue medication


Hyperlipidemia: continue medication

## 2021-02-09 NOTE — P.PN
Subjective


Date of Service: 02/08/21


Subjective: No new changes, No C/O voiced, Improving





Review of Systems


10-point ROS is otherwise unremarkable





Physical Examination





- Vital Signs


Temperature: 97 F


Blood Pressure: 164/78


Pulse: 84


Respirations: 18


Pulse Ox (%): 95





- Physical Exam


General: Alert, In no apparent distress, Demented


Respiratory: Clear to auscultation bilaterally, Normal air movement


Cardiovascular: Regular rate/rhythm, Normal S1 S2, No murmurs


Gastrointestinal: Normal bowel sounds, Soft and benign, Non-distended, No 

tenderness


Musculoskeletal: No clubbing, No swelling, No tenderness


Neurological: Sensation intact, Cranial nerves 3-12 intact





- Studies


Medications List Reviewed: Yes





Assessment & Plan





- Problems (Diagnosis)


(1) Pneumonia due to COVID-19 virus


Current Visit: Yes   Status: Acute   





(2) YANET (acute kidney injury)


Current Visit: Yes   Status: Acute   





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


Current Visit: Yes   Status: Acute   





(4) UTI (urinary tract infection)


Onset Date: 09/18/17   Current Visit: No   Status: Acute   





(5) Weakness generalized


Onset Date: 09/18/17   Current Visit: No   Status: Acute   





(6) Hypertension


Current Visit: Yes   Status: Acute   





(7) Type 2 diabetes mellitus


Current Visit: Yes   Status: Acute   





(8) CKD (chronic kidney disease)


Current Visit: Yes   Status: Acute   





(9) Leukocytosis


Onset Date: 09/18/17   Current Visit: No   Status: Acute   





- Plan





Continue with plan of care as mentioned below


1. Continue supportive care; on BIPAP support; 90% FiO2. Hopefully, we will be 

able to continue to wean down the oxygen slowly


2. IV steroids; may wean down over the next few days his symptoms continued to 

improve


3. Patient is a do not resuscitate


4. Monitor labs closely; YANET; refused dobhoff &pulling at it; encourage fluids 

and shakes; will continue with IVFs


5. GI and DVT prophylaxis





Discharge Plan: Home


Plan to discharge in: Greater than 2 days





- Advance Directives


Does patient have a Living Will: No


Does patient have a Durable POA for Healthcare: No





- Code Status/Comfort Care


Code Status: Do Not Attempt Resuscitat


Critical Care: No


Time Spent Managing PTS Care (In Minutes): 35

## 2021-02-09 NOTE — P.PN
Subjective


Date of Service: 02/09/21


Subjective: Improving


  Oxygen requirements have improved.  Renal function has worsened.  Patient was 

large bloody stool today so will stopped the Eliquis.  Hold off on Plavix as 

well.  Probably worsening uremia secondary to the GI bleeding.  Will notify his 

nephrologist Dr. Anderson for YANET and also Consult GI, Dr. Dutta.





Physical Examination





- Vital Signs


Temperature: 97 F


Blood Pressure: 164/78


Pulse: 84


Respirations: 18


Pulse Ox (%): 95





- Physical Exam


General: Confused


Respiratory: Rhonchi/gurgles


Cardiovascular: Regular rate/rhythm, Normal S1 S2, Systolic murmur


Gastrointestinal: Normal bowel sounds, Soft and benign, Non-distended, No 

tenderness, No masses


Musculoskeletal: No clubbing, No swelling, No contractures





- Studies


Medications List Reviewed: Yes





Assessment & Plan





- Problems (Diagnosis)


(1) Pneumonia due to COVID-19 virus


Current Visit: Yes   Status: Acute   





(2) YANET (acute kidney injury)


Current Visit: Yes   Status: Acute   





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


Current Visit: Yes   Status: Acute   





(4) UTI (urinary tract infection)


Onset Date: 09/18/17   Current Visit: No   Status: Acute   





(5) Weakness generalized


Onset Date: 09/18/17   Current Visit: No   Status: Acute   





(6) Hypertension


Current Visit: Yes   Status: Acute   





(7) Type 2 diabetes mellitus


Current Visit: Yes   Status: Acute   





(8) CKD (chronic kidney disease)


Current Visit: Yes   Status: Acute   





(9) Leukocytosis


Onset Date: 09/18/17   Current Visit: No   Status: Acute   





- Plan





Continue with plan of care as mentioned below


1. Continue supportive care; on BIPAP support; 90% FiO2. Hopefully, we will be 

able to continue to wean down the oxygen slowly


2. IV steroids; may wean down over the next few days his symptoms continued to 

improve


3. Patient is a do not resuscitate


4. Monitor labs closely; YANET; refused dobhoff &pulling at it; encourage fluids 

and shakes; will continue with IVFs


5. GI and DVT prophylaxis








- Advance Directives


Does patient have a Living Will: No


Does patient have a Durable POA for Healthcare: No





- Code Status/Comfort Care


Code Status: Do Not Attempt Resuscitat

## 2021-02-10 LAB
BUN BLD-MCNC: 107 MG/DL (ref 7–18)
FERRITIN SERPL-MCNC: 467.7 NG/ML (ref 26–388)
GLUCOSE SERPLBLD-MCNC: 181 MG/DL (ref 74–106)
HCT VFR BLD CALC: 29.3 % (ref 39.6–49)
INR BLD: 1.28
IRON SERPL-MCNC: 28 UG/DL (ref 65–175)
MAGNESIUM SERPL-MCNC: 2.4 MG/DL (ref 1.8–2.4)
PMV BLD: 9.5 FL (ref 7.6–11.3)
POTASSIUM SERPL-SCNC: 4.2 MMOL/L (ref 3.5–5.1)
RBC # BLD: 3.3 M/UL (ref 4.33–5.43)
RBC # BLD: 3.38 M/UL (ref 4.33–5.43)

## 2021-02-10 RX ADMIN — PANTOPRAZOLE SODIUM SCH MG: 40 TABLET, DELAYED RELEASE ORAL at 08:54

## 2021-02-10 RX ADMIN — Medication SCH MG: at 13:27

## 2021-02-10 RX ADMIN — CEFTRIAXONE SCH MLS: 1 INJECTION, SOLUTION INTRAVENOUS at 08:47

## 2021-02-10 RX ADMIN — Medication SCH MG: at 21:42

## 2021-02-10 RX ADMIN — Medication SCH ML: at 08:55

## 2021-02-10 RX ADMIN — METHYLPREDNISOLONE SODIUM SUCCINATE SCH MG: 125 INJECTION, POWDER, FOR SOLUTION INTRAMUSCULAR; INTRAVENOUS at 08:47

## 2021-02-10 RX ADMIN — Medication SCH MG: at 08:54

## 2021-02-10 RX ADMIN — SODIUM CHLORIDE SCH: 0.45 INJECTION, SOLUTION INTRAVENOUS at 21:57

## 2021-02-10 RX ADMIN — BENZONATATE PRN MG: 100 CAPSULE ORAL at 21:41

## 2021-02-10 RX ADMIN — ATORVASTATIN CALCIUM SCH MG: 10 TABLET, FILM COATED ORAL at 21:42

## 2021-02-10 RX ADMIN — AMLODIPINE BESYLATE SCH MG: 10 TABLET ORAL at 08:54

## 2021-02-10 RX ADMIN — SODIUM CHLORIDE SCH MLS: 0.45 INJECTION, SOLUTION INTRAVENOUS at 12:27

## 2021-02-10 RX ADMIN — PANTOPRAZOLE SODIUM SCH MG: 40 TABLET, DELAYED RELEASE ORAL at 16:54

## 2021-02-10 RX ADMIN — ZINC SULFATE CAP 220 MG (50 MG ELEMENTAL ZN) SCH MG: 220 (50 ZN) CAP at 08:54

## 2021-02-10 RX ADMIN — ACETAMINOPHEN PRN MG: 500 TABLET, FILM COATED ORAL at 13:36

## 2021-02-10 RX ADMIN — Medication SCH ML: at 16:56

## 2021-02-10 RX ADMIN — Medication SCH: at 21:00

## 2021-02-10 RX ADMIN — Medication SCH ML: at 12:28

## 2021-02-10 RX ADMIN — TAMSULOSIN HYDROCHLORIDE SCH MG: 0.4 CAPSULE ORAL at 21:42

## 2021-02-10 RX ADMIN — FOLIC ACID SCH MG: 1 TABLET ORAL at 08:55

## 2021-02-10 RX ADMIN — SODIUM CHLORIDE SCH MLS: 0.45 INJECTION, SOLUTION INTRAVENOUS at 03:50

## 2021-02-10 RX ADMIN — Medication SCH PATCH: at 08:47

## 2021-02-10 RX ADMIN — CHOLECALCIFEROL TAB 25 MCG (1000 UNIT) SCH UNIT: 25 TAB at 08:54

## 2021-02-10 RX ADMIN — METHYLPREDNISOLONE SODIUM SUCCINATE SCH MG: 125 INJECTION, POWDER, FOR SOLUTION INTRAMUSCULAR; INTRAVENOUS at 21:41

## 2021-02-10 RX ADMIN — QUETIAPINE SCH MG: 25 TABLET ORAL at 08:55

## 2021-02-10 RX ADMIN — QUETIAPINE SCH MG: 25 TABLET ORAL at 21:42

## 2021-02-10 RX ADMIN — DOCUSATE SODIUM SCH MG: 100 CAPSULE, LIQUID FILLED ORAL at 08:54

## 2021-02-10 RX ADMIN — FLUCONAZOLE SCH MG: 100 TABLET ORAL at 08:53

## 2021-02-10 NOTE — RAD REPORT
EXAM DESCRIPTION:  RAD - Chest Single View - 2/10/2021 5:50 am

 

CLINICAL HISTORY:  poss pneumonia, shortness of breath

 

COMPARISON:  February 7 portable chest, lung base images February 9 CT abdomen

 

TECHNIQUE:  AP portable chest image was obtained 2/10/2021 5:50 am .

 

FINDINGS:  Lung volumes are low. Airspace opacification is scattered in the right lung field. More de
nse airspace opacification is present in the mid and lower left lung field. Left heart border is obsc
ured and there is left costophrenic angle blunting present. Loop recorder overlies the left chest. Tr
achea is midline. Patient is rotated.

 

 Heart size appears normal for exam limitations. Vasculature mildly prominent due to low lung volumes
. No pneumothorax. Left pleural effusion cannot be excluded. No acute bony abnormality seen. No acute
 aortic findings suspected.

 

IMPRESSION:  Lower left lung field pneumonia.

 

Scattered airspace opacities elsewhere in the chest are present. In the current clinical environment,
 COVID-19 pneumonia cannot be excluded.

## 2021-02-10 NOTE — P.PN
Subjective


Date of Service: 02/10/21


Chief Complaint: Respiratory failure from corona virus


Patient is slightly better still continues to be very agitated oxygen 

requirements have decreased





Review of Systems


is unable to be obtained





Physical Examination





- Vital Signs


Temperature: 96.8 F


Blood Pressure: 150/70


Pulse: 90


Respirations: 16


Pulse Ox (%): 97





- Physical Exam


General: Alert, Moderate distress


Respiratory: Clear to auscultation bilaterally, Diminished


Cardiovascular: No edema, Normal S1 S2





- Studies


Medications List Reviewed: Yes





Assessment & Plan





- Problems (Diagnosis)


(1) Acute respiratory failure due to severe acute respiratory syndrome 

coronavirus 2 (SARS-CoV-2) infection


Current Visit: Yes   Status: Acute   


Plan: 


Respiratory failure from corona virus patient's renal function is improving 

still continues to remain very agitated still hypoxic white count is elevated 

apparently he has some GI bleeding increase IV fluids continue to monitor 

hemoglobin angio do not p.r.n. poor appetite continue to wean down his oxygen

## 2021-02-10 NOTE — RAD REPORT
EXAM DESCRIPTION:  CT - Abdomen   Pelvis Wo Contrast - 2/9/2021 10:05 pm

 

[facility]: CT ABDOMEN PELVIS WITHOUT IV CONTRAST

 

CLINICAL HISTORY:  Gi bleeding.

 

COMPARISON:  None.

 

TECHNIQUE:  Serial axial CT images were obtained from above the diaphragm through the pubic symphysis
 without administration of intravenous or oral contrast.

All CT scans are performed using dose optimization techniques as appropriate, including automated exp
osure control and/or standardized protocols, where dose is adjusted for indication for exam and body 
habitus.

 

FINDINGS:  Thoracic: Prominent interstitial thickening in the lung bases. Questionable small amount o
f pneumomediastinum/pneumopericardium, partially imaged.

Hepatobiliary: No obvious concerning hepatic lesion identified in the absence of intravenous contrast
. A few tiny calcified granulomata in the liver. The hepatic and portal veins are patent. Calcified s
tones in the gallbladder. No gallbladder wall thickening. No biliary ductal dilatation.

Pancreas: Unremarkable.

Spleen: Unremarkable.

Gastrointestinal: No evidence of bowel obstruction or perienteric inflammation. The appendix is flores
l. Moderate to large amount of fecal material in the rectum. Mild to moderate amount of fecal materia
l throughout the colon. Mild pancolonic diverticulosis.

Adrenals: No abnormality identified in either adrenal gland.

Renal: Interpolar right renal posterior simple cyst measures 5.9 cm. Inferior right renal 4.1 cm homo
geneous minimally complex lesion (Hounsfield units 18). Superior left renal 1.6 cm hypodensity with a
 thin septation. Possible few smaller hypodensities in the left kidney. No hydronephrosis or urolithi
asis.

Bladder/Reproductive: Unremarkable appearance of the urinary bladder by CT technique. Mild prostatome
randy.

Vascular/Lymphatics: No lymphadenopathy identified by CT size criteria. Abdominal aorta is normal in 
caliber. Prominent calcific atherosclerosis.

Musculoskeletal: No concerning osseous lesion identified. Osteopenia. Multifocal osseous degenerative
 changes. Tiny left-sided inguinal hernia containing only fat.

Fluid / peritoneum: No significant free fluid.   No free intraperitoneal air identified.

 

IMPRESSION:  1.   Moderate to large amount of fecal material in the rectum.

2.   Mild colonic diverticulosis with no evidence of acute diverticulitis.

3.   Cholelithiasis.

4.   Partially imaged air density in the pericardium/mediastinum, with differential including bronchi
oles within atelectatic lung versus pneumomediastinum/pneumopericardium. Recommend correlation with c
hest radiograph or chest CT.

 

Electronically signed by:   Estelita Juarez MD   2/9/2021 10:30 PM CST Workstation: [id number]

 

 

Due to temporary technical issues with the PACS/Fluency reporting system, reports are being signed by
 the in house radiologist without review as a courtesy to ensure prompt reporting. The interpreting r
adiologist is fully responsible for the content of the report.

## 2021-02-11 LAB
BUN BLD-MCNC: 80 MG/DL (ref 7–18)
GLUCOSE SERPLBLD-MCNC: 176 MG/DL (ref 74–106)
HCT VFR BLD CALC: 27.1 % (ref 39.6–49)
PMV BLD: 9.6 FL (ref 7.6–11.3)
POTASSIUM SERPL-SCNC: 4.4 MMOL/L (ref 3.5–5.1)
RBC # BLD: 3.09 M/UL (ref 4.33–5.43)

## 2021-02-11 RX ADMIN — CHOLECALCIFEROL TAB 25 MCG (1000 UNIT) SCH UNIT: 25 TAB at 08:21

## 2021-02-11 RX ADMIN — ZINC SULFATE CAP 220 MG (50 MG ELEMENTAL ZN) SCH MG: 220 (50 ZN) CAP at 08:22

## 2021-02-11 RX ADMIN — PANTOPRAZOLE SODIUM SCH MG: 40 TABLET, DELAYED RELEASE ORAL at 08:21

## 2021-02-11 RX ADMIN — METHYLPREDNISOLONE SODIUM SUCCINATE SCH MG: 125 INJECTION, POWDER, FOR SOLUTION INTRAMUSCULAR; INTRAVENOUS at 21:20

## 2021-02-11 RX ADMIN — Medication SCH MG: at 21:19

## 2021-02-11 RX ADMIN — SODIUM CHLORIDE SCH MLS: 0.45 INJECTION, SOLUTION INTRAVENOUS at 23:32

## 2021-02-11 RX ADMIN — ATORVASTATIN CALCIUM SCH MG: 10 TABLET, FILM COATED ORAL at 21:18

## 2021-02-11 RX ADMIN — Medication SCH ML: at 08:25

## 2021-02-11 RX ADMIN — Medication SCH ML: at 16:43

## 2021-02-11 RX ADMIN — AMLODIPINE BESYLATE SCH MG: 10 TABLET ORAL at 08:22

## 2021-02-11 RX ADMIN — Medication SCH MG: at 21:18

## 2021-02-11 RX ADMIN — SODIUM CHLORIDE SCH MLS: 0.45 INJECTION, SOLUTION INTRAVENOUS at 16:51

## 2021-02-11 RX ADMIN — CEFTRIAXONE SCH MLS: 1 INJECTION, SOLUTION INTRAVENOUS at 08:20

## 2021-02-11 RX ADMIN — SODIUM CHLORIDE SCH MLS: 0.45 INJECTION, SOLUTION INTRAVENOUS at 02:01

## 2021-02-11 RX ADMIN — ACETAMINOPHEN PRN MG: 500 TABLET, FILM COATED ORAL at 21:21

## 2021-02-11 RX ADMIN — Medication SCH ML: at 11:25

## 2021-02-11 RX ADMIN — QUETIAPINE SCH MG: 25 TABLET ORAL at 21:19

## 2021-02-11 RX ADMIN — SODIUM CHLORIDE SCH MLS: 0.45 INJECTION, SOLUTION INTRAVENOUS at 08:20

## 2021-02-11 RX ADMIN — Medication SCH MG: at 08:21

## 2021-02-11 RX ADMIN — FLUCONAZOLE SCH MG: 100 TABLET ORAL at 08:21

## 2021-02-11 RX ADMIN — PANTOPRAZOLE SODIUM SCH MG: 40 TABLET, DELAYED RELEASE ORAL at 16:43

## 2021-02-11 RX ADMIN — Medication SCH MG: at 08:22

## 2021-02-11 RX ADMIN — METHYLPREDNISOLONE SODIUM SUCCINATE SCH MG: 125 INJECTION, POWDER, FOR SOLUTION INTRAMUSCULAR; INTRAVENOUS at 08:21

## 2021-02-11 RX ADMIN — Medication SCH PATCH: at 08:20

## 2021-02-11 RX ADMIN — QUETIAPINE SCH MG: 25 TABLET ORAL at 08:22

## 2021-02-11 RX ADMIN — DOCUSATE SODIUM SCH MG: 100 CAPSULE, LIQUID FILLED ORAL at 08:22

## 2021-02-11 RX ADMIN — FOLIC ACID SCH MG: 1 TABLET ORAL at 08:22

## 2021-02-11 RX ADMIN — TAMSULOSIN HYDROCHLORIDE SCH MG: 0.4 CAPSULE ORAL at 21:18

## 2021-02-11 RX ADMIN — Medication SCH MG: at 14:41

## 2021-02-11 RX ADMIN — Medication SCH ML: at 21:17

## 2021-02-11 NOTE — P.PN
Date of Service: 02/10/21





 Subjective 


Subjective: 


Patient was dehydrated but hydration has improved renal function.  Patient 

appears to be doing a little better





 Physical Examination 





- Vital Signs


Reviewed





- Physical Exam


General: Alert, In no apparent distress, Demented


Respiratory: Clear to auscultation bilaterally, Normal air movement


Cardiovascular: Regular rate/rhythm, Normal S1 S2, No murmurs


Gastrointestinal: Normal bowel sounds, Soft and benign, Non-distended, No 

tenderness


Musculoskeletal: No clubbing, No swelling, No tenderness


Neurological: Sensation intact, Cranial nerves 3-12 intact





- Studies


Medications List Reviewed: Yes





 Assessment & Plan 





- Problems (Diagnosis)


(1) Pneumonia due to COVID-19 virus


Current Visit: Yes   Status: Acute   





(2) YANET (acute kidney injury)


Current Visit: Yes   Status: Acute   





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


Current Visit: Yes   Status: Acute   





(4) UTI (urinary tract infection)


Onset Date: 09/18/17   Current Visit: No   Status: Acute   





(5) Weakness generalized


Onset Date: 09/18/17   Current Visit: No   Status: Acute   





(6) Hypertension


Current Visit: Yes   Status: Acute   





(7) Type 2 diabetes mellitus


Current Visit: Yes   Status: Acute   





(8) CKD (chronic kidney disease)


Current Visit: Yes   Status: Acute   





(9) Leukocytosis


Onset Date: 09/18/17   Current Visit: No   Status: Acute   





- Plan





Continue with plan of care as mentioned below


1. Hydration and renal function are improved


2. Need to wean down steroids


3. Patient is a do not resuscitate


4. Advanced diet as tolerated


5. GI and DVT prophylaxis

## 2021-02-11 NOTE — P.PN
Date of Service: 02/11/21


Vital Signs











Temp Pulse Resp BP Pulse Ox


 


 98.2 F   71   22 H  142/94 H  89 L


 


 02/11/21 19:47  02/11/21 19:47  02/11/21 19:47  02/11/21 19:47  02/11/21 19:47





Medications





Acetaminophen (Acetaminophen 500 Mg Tab)  500 mg PO Q6H PRN


   PRN Reason: TEMP > 100' F


   Stop: 02/19/21 22:35


   Last Admin: 02/11/21 21:21 Dose:  500 mg


   Documented by: 


Amlodipine Besylate (Amlodipine 10 Mg Tab)  10 mg PO DAILY [facility]


   Stop: 02/22/21 09:01


   Last Admin: 02/11/21 08:22 Dose:  10 mg


   Documented by: 


Ascorbic Acid (Ascorbic Acid 500 Mg Tablet)  500 mg PO TID [facility]


   Stop: 03/04/21 14:01


   Last Admin: 02/11/21 21:18 Dose:  500 mg


   Documented by: 


Atorvastatin Calcium (Atorvastatin 10 Mg Tab)  10 mg PO BEDTIME [facility]


   Stop: 03/03/21 21:01


   Last Admin: 02/11/21 21:18 Dose:  10 mg


   Documented by: 


Benzonatate (Benzonatate 100 Mg Cap)  200 mg PO Q4HP PRN


   PRN Reason: COUGH


   Stop: 02/25/21 20:11


   Last Admin: 02/10/21 21:41 Dose:  200 mg


   Documented by: 


Carvedilol (Carvedilol 12.5 Mg Tab)  12.5 mg PO DAILY [facility]


   Stop: 02/20/21 14:01


   Last Admin: 02/11/21 08:22 Dose:  12.5 mg


   Documented by: 


Cholecalciferol (Vitamin D 1000 Unit Tab)  2,000 unit PO DAILY [facility]


   Stop: 03/05/21 09:01


   Last Admin: 02/11/21 08:21 Dose:  2,000 unit


   Documented by: 


Docusate Sodium (Docusate Na 100 Mg Cap)  100 mg PO DAILY [facility]


   Last Admin: 02/11/21 08:22 Dose:  100 mg


   Documented by: 


Enteral Nutritional Formula (Glucerna Shake 237 Ml Can)  237 ml PO TIDWM [facility]


   Last Admin: 02/11/21 16:43 Dose:  237 ml


   Documented by: 


Fluconazole (Fluconazole 100 Mg Tab)  200 mg PO DAILY [facility]; Protocol


   Last Admin: 02/11/21 08:21 Dose:  200 mg


   Documented by: 


Folic Acid (Folic Acid 1 Mg Tablet)  1 mg PO DAILY [facility]


   Stop: 03/05/21 09:01


   Last Admin: 02/11/21 08:22 Dose:  1 mg


   Documented by: 


Ceftriaxone Sodium/Sodium Chloride (Rocephin 1 Gm/10 Ml Swi Ivp)  1 gm in 10 mls

@ 600 mls/hr IV DAILY [facility]


   Last Admin: 02/11/21 08:20 Dose:  10 mls


   Documented by: 


Sodium Chloride (Sodium Chloride 0.45%)  1,000 mls @ 100 mls/hr IV .Q10H [facility]


   Last Admin: 02/11/21 16:51 Dose:  1,000 mls


   Documented by: 


Lactulose (Lactulose 20 Gm/30 Ml Ucup)  10 gm PO BID PRN


   PRN Reason: CONSTIPATION


   Last Admin: 02/06/21 17:26 Dose:  10 gm


   Documented by: 


Lidocaine (Lidocaine 4% Patch)  1 patch TOP DAILY [facility]


   Stop: 02/22/21 20:01


   Last Admin: 02/11/21 08:20 Dose:  1 patch


   Documented by: 


Methylprednisolone Sodium Succinate (Methylprednisolone 125 Mg Inj)  80 mg IV 

BID [facility]


   Stop: 03/04/21 14:01


   Last Admin: 02/11/21 21:20 Dose:  80 mg


   Documented by: 


Pantoprazole Sodium (Pantoprazole 40mg Tablet)  40 mg PO BIDAC [facility]; Protocol


   Stop: 03/02/21 09:01


   Last Admin: 02/11/21 16:43 Dose:  40 mg


   Documented by: 


Quetiapine Fumarate (Quetiapine 25 Mg Tab)  25 mg PO BID [facility]


   Stop: 02/20/21 21:01


   Last Admin: 02/11/21 21:19 Dose:  25 mg


   Documented by: 


Sodium Chloride (Flush Normal Saline 10 Ml)  10 ml IV BID [facility]


   Stop: 02/19/21 22:35


   Last Admin: 02/11/21 21:17 Dose:  10 ml


   Documented by: 


Sodium Chloride (Sodium Chloride 0.9% 10ml Inj)  10 ml IV UD PRN


   PRN Reason: Diluant


   Stop: 02/28/21 16:17


Sterile Water (Water For Inj,Sterile 10 Ml)  1.2 ml IM UD PRN


   PRN Reason: DILUTION OF MED


Tamsulosin HCl (Tamsulosin 0.4 Mg Sr Cap)  0.4 mg PO BEDTIME [facility]


   Stop: 02/20/21 21:01


   Last Admin: 02/11/21 21:18 Dose:  0.4 mg


   Documented by: 


Thiamine HCl (Thiamine Hcl 100 Mg Tablet)  200 mg PO BID [facility]


   Stop: 03/04/21 21:01


   Last Admin: 02/11/21 21:19 Dose:  200 mg


   Documented by: 


Zinc Sulfate (Zinc Sulfate 220 Mg Cap)  220 mg PO DAILY [facility]


   Stop: 03/05/21 09:01


   Last Admin: 02/11/21 08:22 Dose:  220 mg


   Documented by: 


Ziprasidone (Ziprasidone Mesyla 20 Mg/Vial)  10 mg IM Q6H PRN


   PRN Reason: AGITATION


Microbiology Results





01/20/21 18:00   Blood  - Blood   Aerobic Blood Culture - Final


                            No growth in 5 days.


01/20/21 18:00   Blood  - Blood   Anaerobic Blood Culture - Final


                            No growth in 5 days.


01/20/21 17:00   Blood  - Blood   Aerobic Blood Culture - Final


                            No growth in 5 days.


01/20/21 17:00   Blood  - Blood   Anaerobic Blood Culture - Final


                            No growth in 5 days.


01/20/21 18:30   Clean Catch Urine   [place] Count - Final


                            No growth.


01/20/21 18:30   Clean Catch Urine    - Final


                            No growth.





Assessment/ Plan: 


Nephrology





No acute cardiac or pulmonary compaints.


No CP or SOB.


No acute events overnight.





Vitals, medications, blood work and imaging reviewed in the chart.


NAD.  MMM.  Neck supple.  CTA.  RRR.  Soft Abd.  No C/C/E.  No rash.  AAO.  

Normal Speech. 





A/P:  Continue the current POC and Medications other than the changes listed.  

AM Labs PRN.  Recommend daily weight.  Please see the orders for complete 

details.





YANET


CKD 3B with protienuria


-Gentle IVF


-No NSAIDs





Hyponatremia


-May need to change IVF if hyponatremia worsens





Hypocalcemia


-Continue Vitamin D


-Start Calcitriol





HTN with CKD/ CHF


-Continue Amlodipine and Coreg





Diastolic CHF, chronic


-Continue Coreg





DM II with CKD


-Wean steroids as tolerated





Moderate malnutrition


-Encourage nutrition


-Recommend protein supplementation





Anemia in chronic illness


GI bleed


-Give Retacrit X1





BPH with LUTS


-Continue Flomax





Acute hypoxic respiratory failure due to COVID-19


-Continue steroid therapy

## 2021-02-11 NOTE — CON
Date of Consultation:  02/10/2021



Subjective:  The patient is an 87-year-old male who has been admitted to the hospital since the 21st of January.  He was originally admitted from the nursing home with worsening altered mental status an
d respiratory distress.  He was found to have COVID-19 pneumonia and has been in the hospital since t
hat time.  His hospital course has been complicated by acute renal failure because of which nephrolog
y consult is being requested.  He is also being treated for COVID-19 pneumonia as well as his respira
tory status is being closely monitored.



Past Medical History:  Significant for history of hypertension, type 2 diabetes, GERD, hyperlipidemia
, BPH, history of CKD, dementia, bilateral knee replacement, and appendectomy.



Family History:  Significant for history of heart disease in the brother and hypertension.



Social History:  No history of smoking, alcohol, or drug use reported.  He was living at home.



Review of Systems:

Unable to be obtained at this time.



Physical Examination:

Vital Signs:  Showing temperature of 97.1, pulse rate of 68, respiratory rate of 20, and blood pressu
re 142/66.  

General:  He appears in no acute distress.  He is on nasal cannula oxygen.  

Lungs:  Clear to auscultation.  Lungs revealed bilateral creps with occasional rhonchi.  

Abdomen:  Soft and nontender. 

Extremities:  Showed no evidence of edema.



Laboratory Data:  At this time are showing sodium of 132, potassium of 4.2, chloride of 94, BUN of 10
7, and creatinine of 2.23.  Calcium was low at 8 and phosphorus was 3.  His ferritin levels were elev
ated.  CBC showing WBC count of 18.2, hematocrit of 29.3 and hemoglobin of 10.



Current Medications:  Include normal saline at 100 cc an hour, Tylenol, amlodipine 10 mg a day, vitam
in C, atorvastatin, carvedilol, Rocephin, fluconazole, folic acid, Solu-Medrol 80 mg IV b.i.d., Seroq
uel 25 mg b.i.d., tamsulosin 0.4 mg at bedtime, thiamine and Geodon p.r.n.



Impression:  

1.Acute renal failure secondary to acute tubular necrosis, possibly from ongoing GI bleed associated
 also with concomitant COVID-19.  The patient's renal function is slightly better today compared to t
he last few days.  He has been started on IV fluids, which I will continue for right now.

2.Hyponatremia, possibly secondary to hypovolemia versus SIADH.  His volume status looks hypovolemic
 at this time.  We will continue with normal saline and monitor.

3.Acute respiratory failure with hypoxia secondary to COVID-19 pneumonia.  The patient is being foll
owed by Pulmonary Service and is currently on IV steroids, which is being monitored.

4.Benign prostatic hypertrophy.

5.Alzheimer dementia.

6.Acute gastrointestinal bleed.  The patient's hemoglobin is being monitored closely and threshold f
or transfusion with hemoglobin of 7.



Plan:  Overall, patient is clinically stable.  I would agree with IV fluids at this time, to monitor 
renal function, avoid further hypotension and nephrotoxins and follow up closely.  Continue all other
 medications and plan of care.  Thank you for allowing us to participate in this patient's care.  Ple
ase do not hesitate to call us with any questions or concerns.





VV/MODL

DD:  02/10/2021 20:25:18Voice ID:  [id number]

DT:  02/11/2021 00:01:20Report ID:  [id number]

## 2021-02-12 LAB
BUN BLD-MCNC: 66 MG/DL (ref 7–18)
GLUCOSE SERPLBLD-MCNC: 175 MG/DL (ref 74–106)
HCT VFR BLD CALC: 27.7 % (ref 39.6–49)
PMV BLD: 9.4 FL (ref 7.6–11.3)
POTASSIUM SERPL-SCNC: 4.9 MMOL/L (ref 3.5–5.1)
RBC # BLD: 3.16 M/UL (ref 4.33–5.43)

## 2021-02-12 RX ADMIN — CALCITRIOL CAPSULES 0.25 MCG SCH MCG: 0.25 CAPSULE ORAL at 08:59

## 2021-02-12 RX ADMIN — ATORVASTATIN CALCIUM SCH MG: 10 TABLET, FILM COATED ORAL at 21:08

## 2021-02-12 RX ADMIN — METHYLPREDNISOLONE SODIUM SUCCINATE SCH MG: 125 INJECTION, POWDER, FOR SOLUTION INTRAMUSCULAR; INTRAVENOUS at 09:00

## 2021-02-12 RX ADMIN — Medication SCH ML: at 11:07

## 2021-02-12 RX ADMIN — PANTOPRAZOLE SODIUM SCH MG: 40 TABLET, DELAYED RELEASE ORAL at 17:08

## 2021-02-12 RX ADMIN — Medication SCH MG: at 21:08

## 2021-02-12 RX ADMIN — Medication SCH ML: at 09:00

## 2021-02-12 RX ADMIN — DOCUSATE SODIUM SCH MG: 100 CAPSULE, LIQUID FILLED ORAL at 09:01

## 2021-02-12 RX ADMIN — Medication SCH MG: at 08:58

## 2021-02-12 RX ADMIN — ACETAMINOPHEN PRN MG: 500 TABLET, FILM COATED ORAL at 11:07

## 2021-02-12 RX ADMIN — HUMAN INSULIN SCH UNITS: 100 INJECTION, SUSPENSION SUBCUTANEOUS at 17:07

## 2021-02-12 RX ADMIN — FLUCONAZOLE SCH MG: 100 TABLET ORAL at 08:58

## 2021-02-12 RX ADMIN — ENOXAPARIN SODIUM SCH MG: 40 INJECTION SUBCUTANEOUS at 17:07

## 2021-02-12 RX ADMIN — TAMSULOSIN HYDROCHLORIDE SCH MG: 0.4 CAPSULE ORAL at 21:08

## 2021-02-12 RX ADMIN — Medication SCH ML: at 17:08

## 2021-02-12 RX ADMIN — Medication SCH MG: at 21:07

## 2021-02-12 RX ADMIN — Medication SCH MG: at 14:22

## 2021-02-12 RX ADMIN — Medication SCH PATCH: at 08:57

## 2021-02-12 RX ADMIN — FOLIC ACID SCH MG: 1 TABLET ORAL at 08:58

## 2021-02-12 RX ADMIN — PANTOPRAZOLE SODIUM SCH MG: 40 TABLET, DELAYED RELEASE ORAL at 08:58

## 2021-02-12 RX ADMIN — METHYLPREDNISOLONE SODIUM SUCCINATE SCH MG: 125 INJECTION, POWDER, FOR SOLUTION INTRAMUSCULAR; INTRAVENOUS at 21:08

## 2021-02-12 RX ADMIN — Medication SCH ML: at 09:01

## 2021-02-12 RX ADMIN — AMLODIPINE BESYLATE SCH MG: 10 TABLET ORAL at 09:01

## 2021-02-12 RX ADMIN — QUETIAPINE SCH MG: 25 TABLET ORAL at 09:00

## 2021-02-12 RX ADMIN — Medication SCH ML: at 21:09

## 2021-02-12 RX ADMIN — CHOLECALCIFEROL TAB 25 MCG (1000 UNIT) SCH UNIT: 25 TAB at 08:57

## 2021-02-12 RX ADMIN — SODIUM CHLORIDE SCH: 0.45 INJECTION, SOLUTION INTRAVENOUS at 13:57

## 2021-02-12 RX ADMIN — ZINC SULFATE CAP 220 MG (50 MG ELEMENTAL ZN) SCH MG: 220 (50 ZN) CAP at 09:00

## 2021-02-12 RX ADMIN — CEFTRIAXONE SCH MLS: 1 INJECTION, SOLUTION INTRAVENOUS at 08:59

## 2021-02-12 RX ADMIN — QUETIAPINE SCH MG: 25 TABLET ORAL at 21:08

## 2021-02-12 NOTE — P.PN
Date of Service: 02/12/21


Vital Signs











Temp Pulse Resp BP Pulse Ox


 


 97.8 F   73   19   149/69 H  100 


 


 02/12/21 20:00  02/12/21 20:00  02/12/21 20:00  02/12/21 21:20  02/12/21 20:00





Medications





Acetaminophen (Acetaminophen 500 Mg Tab)  500 mg PO Q6H PRN


   PRN Reason: TEMP > 100' F


   Stop: 02/19/21 22:35


   Last Admin: 02/12/21 11:07 Dose:  500 mg


   Documented by: 


Amlodipine Besylate (Amlodipine 10 Mg Tab)  10 mg PO DAILY [facility]


   Stop: 02/22/21 09:01


   Last Admin: 02/12/21 09:01 Dose:  10 mg


   Documented by: 


Ascorbic Acid (Ascorbic Acid 500 Mg Tablet)  500 mg PO TID [facility]


   Stop: 03/04/21 14:01


   Last Admin: 02/12/21 21:08 Dose:  500 mg


   Documented by: 


Atorvastatin Calcium (Atorvastatin 10 Mg Tab)  10 mg PO BEDTIME [facility]


   Stop: 03/03/21 21:01


   Last Admin: 02/12/21 21:08 Dose:  10 mg


   Documented by: 


Benzonatate (Benzonatate 100 Mg Cap)  200 mg PO Q4HP PRN


   PRN Reason: COUGH


   Stop: 02/25/21 20:11


   Last Admin: 02/10/21 21:41 Dose:  200 mg


   Documented by: 


Calcitriol (Calcitrol 0.25 Mcg Cap)  0.5 mcg PO DAILY [facility]


   Last Admin: 02/12/21 08:59 Dose:  0.5 mcg


   Documented by: 


Carvedilol (Carvedilol 12.5 Mg Tab)  12.5 mg PO DAILY [facility]


   Stop: 02/20/21 14:01


   Last Admin: 02/12/21 08:59 Dose:  12.5 mg


   Documented by: 


Cholecalciferol (Vitamin D 1000 Unit Tab)  2,000 unit PO DAILY [facility]


   Stop: 03/05/21 09:01


   Last Admin: 02/12/21 08:57 Dose:  2,000 unit


   Documented by: 


Dextrose (D50w 25 Gm/50 Ml Vial)  12.5 gm IV PRN PRN; Protocol


   PRN Reason: HYPOGLYCEMIA


Docusate Sodium (Docusate Na 100 Mg Cap)  100 mg PO DAILY [facility]


   Last Admin: 02/12/21 09:01 Dose:  100 mg


   Documented by: 


Enoxaparin Sodium (Enoxaparin 40 Mg/0.4 Ml)  40 mg SQ DAILY [facility]


   Last Admin: 02/12/21 17:07 Dose:  40 mg


   Documented by: 


Enteral Nutritional Formula (Glucerna Shake 237 Ml Can)  237 ml PO TIDWM [facility]


   Last Admin: 02/12/21 17:08 Dose:  237 ml


   Documented by: 


Fluconazole (Fluconazole 100 Mg Tab)  200 mg PO DAILY [facility]; Protocol


   Last Admin: 02/12/21 08:58 Dose:  200 mg


   Documented by: 


Folic Acid (Folic Acid 1 Mg Tablet)  1 mg PO DAILY [facility]


   Stop: 03/05/21 09:01


   Last Admin: 02/12/21 08:58 Dose:  1 mg


   Documented by: 


Glucagon (Glucagon 1 Mg/Vial)  1 mg IM 1X PRN; Protocol


   PRN Reason: HYPOGLYCEMIA


Ceftriaxone Sodium/Sodium Chloride (Rocephin 1 Gm/10 Ml Swi Ivp)  1 gm in 10 mls

@ 600 mls/hr IV DAILY [facility]


   Last Admin: 02/12/21 08:59 Dose:  10 mls


   Documented by: 


Insulin Human Isoph/Insulin Regular (Insulin 70/30 100 Units/Ml)  10 unit SQ 

BIDAC [facility]


   Last Admin: 02/12/21 17:07 Dose:  10 units


   Documented by: 


Lactulose (Lactulose 20 Gm/30 Ml Ucup)  10 gm PO BID PRN


   PRN Reason: CONSTIPATION


   Last Admin: 02/06/21 17:26 Dose:  10 gm


   Documented by: 


Lidocaine (Lidocaine 4% Patch)  1 patch TOP DAILY [facility]


   Stop: 02/22/21 20:01


   Last Admin: 02/12/21 08:57 Dose:  1 patch


   Documented by: 


Methylprednisolone Sodium Succinate (Methylprednisolone 125 Mg Inj)  80 mg IV 

BID [facility]


   Stop: 03/04/21 14:01


   Last Admin: 02/12/21 21:08 Dose:  80 mg


   Documented by: 


Pantoprazole Sodium (Pantoprazole 40mg Tablet)  40 mg PO BIDAC [facility]; Protocol


   Stop: 03/02/21 09:01


   Last Admin: 02/12/21 17:08 Dose:  40 mg


   Documented by: 


Quetiapine Fumarate (Quetiapine 25 Mg Tab)  25 mg PO BID [facility]


   Stop: 02/20/21 21:01


   Last Admin: 02/12/21 21:08 Dose:  25 mg


   Documented by: 


Sodium Chloride (Flush Normal Saline 10 Ml)  10 ml IV BID [facility]


   Stop: 02/19/21 22:35


   Last Admin: 02/12/21 21:09 Dose:  10 ml


   Documented by: 


Sodium Chloride (Sodium Chloride 0.9% 10ml Inj)  10 ml IV UD PRN


   PRN Reason: Diluant


   Stop: 02/28/21 16:17


Sterile Water (Water For Inj,Sterile 10 Ml)  1.2 ml IM UD PRN


   PRN Reason: DILUTION OF MED


Tamsulosin HCl (Tamsulosin 0.4 Mg Sr Cap)  0.4 mg PO BEDTIME [facility]


   Stop: 02/20/21 21:01


   Last Admin: 02/12/21 21:08 Dose:  0.4 mg


   Documented by: 


Thiamine HCl (Thiamine Hcl 100 Mg Tablet)  200 mg PO BID [facility]


   Stop: 03/04/21 21:01


   Last Admin: 02/12/21 21:07 Dose:  200 mg


   Documented by: 


Zinc Sulfate (Zinc Sulfate 220 Mg Cap)  220 mg PO DAILY [facility]


   Stop: 03/05/21 09:01


   Last Admin: 02/12/21 09:00 Dose:  220 mg


   Documented by: 


Ziprasidone (Ziprasidone Mesyla 20 Mg/Vial)  10 mg IM Q6H PRN


   PRN Reason: AGITATION


Microbiology Results





01/20/21 18:00   Blood  - Blood   Aerobic Blood Culture - Final


                            No growth in 5 days.


01/20/21 18:00   Blood  - Blood   Anaerobic Blood Culture - Final


                            No growth in 5 days.


01/20/21 17:00   Blood  - Blood   Aerobic Blood Culture - Final


                            No growth in 5 days.


01/20/21 17:00   Blood  - Blood   Anaerobic Blood Culture - Final


                            No growth in 5 days.


01/20/21 18:30   Clean Catch Urine   [place] Count - Final


                            No growth.


01/20/21 18:30   Clean Catch Urine    - Final


                            No growth.





Assessment/ Plan: 


Nephrology





No acute cardiac or pulmonary complaints.


No CP or SOB.  +COOPER


Weakness and fatigue


No acute events overnight.





Vitals, medications, blood work and imaging reviewed in the chart.


NAD.  MMM.  Neck supple.  CTA.  RRR.  Soft Abd.  No C/C/E.  No rash.  AAO.  

Normal Speech. 





A/P:  Continue the current POC and Medications other than the changes listed.  

AM Labs PRN.  Recommend daily weight.  Please see the orders for complete 

details.





YANET


CKD 3B with protienuria


-Stop IVF and restart as needed.


-No NSAIDs





Hyponatremia


-Stop IVF





Hypocalcemia


-Continue Vitamin D


-Continue Calcitriol





HTN with CKD/ CHF


-Continue Amlodipine and Coreg





Diastolic CHF, chronic


-Continue Coreg





DM II with CKD


-Wean steroids as tolerated





Moderate malnutrition


-Encourage nutrition


-Recommend protein supplementation





Anemia in chronic illness


GI bleed


-Retacrit PRN





BPH with LUTS


-Continue Flomax





Acute hypoxic respiratory failure due to COVID-19


-Continue steroid therapy

## 2021-02-12 NOTE — P.PN
Subjective


Date of Service: 02/12/21


Chief Complaint: Respiratory failure from corona virus


The seems to be improving a little still continues to be agitated saturation 

satisfactory





Review of Systems


General: Weakness


Respiratory: Shortness of Breath





Physical Examination





- Vital Signs


Temperature: 97.2 F


Blood Pressure: 153/70


Pulse: 62


Respirations: 22


Pulse Ox (%): 100





- Studies


Medications List Reviewed: Yes





Assessment & Plan





- Problems (Diagnosis)


(1) Acute respiratory failure due to severe acute respiratory syndrome 

coronavirus 2 (SARS-CoV-2) infection


Current Visit: Yes   Status: Acute   


Plan: 


Respiratory failure from corona virus patient's renal function has significantly

improved white count is also declining continue with IV fluids steroids continue

to titrate O2 down blood sugars still elevated add insulin patient's hemoglobin 

stable resume Lovenox

## 2021-02-12 NOTE — P.PN
Date of Service: 02/11/21





 Subjective 


Subjective: 


Doing well. No new complaints. 





 Physical Examination 





- Vital Signs


Reviewed





- Physical Exam


General: Alert, In no apparent distress, Demented


Respiratory: Clear to auscultation bilaterally, Normal air movement


Cardiovascular: Regular rate/rhythm, Normal S1 S2, No murmurs


Gastrointestinal: Normal bowel sounds, Soft and benign, Non-distended, No 

tenderness


Musculoskeletal: No clubbing, No swelling, No tenderness


Neurological: Sensation intact, Cranial nerves 3-12 intact





 Assessment & Plan 





- Problems (Diagnosis)


(1) Pneumonia due to COVID-19 virus


Current Visit: Yes   Status: Acute   





(2) YANET (acute kidney injury)


Current Visit: Yes   Status: Acute   





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


Current Visit: Yes   Status: Acute   





(4) UTI (urinary tract infection)


Onset Date: 09/18/17   Current Visit: No   Status: Acute   





(5) Weakness generalized


Onset Date: 09/18/17   Current Visit: No   Status: Acute   





(6) Hypertension


Current Visit: Yes   Status: Acute   





(7) Type 2 diabetes mellitus


Current Visit: Yes   Status: Acute   





(8) CKD (chronic kidney disease)


Current Visit: Yes   Status: Acute   





(9) Leukocytosis


Onset Date: 09/18/17   Current Visit: No   Status: Acute   





- Plan


Continue with plan of care as mentioned below


1. Hydration and renal function are improved


2. Need to wean down steroids


3. Patient is a do not resuscitate


4. Advanced diet as tolerated


5. GI and DVT prophylaxis

## 2021-02-13 RX ADMIN — FLUCONAZOLE SCH MG: 100 TABLET ORAL at 08:31

## 2021-02-13 RX ADMIN — METHYLPREDNISOLONE SODIUM SUCCINATE SCH MG: 125 INJECTION, POWDER, FOR SOLUTION INTRAMUSCULAR; INTRAVENOUS at 08:33

## 2021-02-13 RX ADMIN — Medication SCH ML: at 20:33

## 2021-02-13 RX ADMIN — ENOXAPARIN SODIUM SCH MG: 40 INJECTION SUBCUTANEOUS at 08:31

## 2021-02-13 RX ADMIN — Medication SCH ML: at 11:19

## 2021-02-13 RX ADMIN — QUETIAPINE SCH MG: 25 TABLET ORAL at 20:34

## 2021-02-13 RX ADMIN — QUETIAPINE SCH MG: 25 TABLET ORAL at 07:15

## 2021-02-13 RX ADMIN — Medication SCH MG: at 20:34

## 2021-02-13 RX ADMIN — CHOLECALCIFEROL TAB 25 MCG (1000 UNIT) SCH UNIT: 25 TAB at 08:32

## 2021-02-13 RX ADMIN — CEFTRIAXONE SCH MLS: 1 INJECTION, SOLUTION INTRAVENOUS at 08:29

## 2021-02-13 RX ADMIN — AMLODIPINE BESYLATE SCH MG: 10 TABLET ORAL at 08:32

## 2021-02-13 RX ADMIN — Medication SCH ML: at 18:17

## 2021-02-13 RX ADMIN — TAMSULOSIN HYDROCHLORIDE SCH MG: 0.4 CAPSULE ORAL at 20:34

## 2021-02-13 RX ADMIN — METHYLPREDNISOLONE SODIUM SUCCINATE SCH MG: 125 INJECTION, POWDER, FOR SOLUTION INTRAMUSCULAR; INTRAVENOUS at 20:35

## 2021-02-13 RX ADMIN — MORPHINE SULFATE PRN MG: 2 INJECTION, SOLUTION INTRAMUSCULAR; INTRAVENOUS at 15:43

## 2021-02-13 RX ADMIN — Medication SCH ML: at 08:34

## 2021-02-13 RX ADMIN — ACETAMINOPHEN PRN MG: 500 TABLET, FILM COATED ORAL at 20:34

## 2021-02-13 RX ADMIN — HUMAN INSULIN SCH UNITS: 100 INJECTION, SUSPENSION SUBCUTANEOUS at 16:08

## 2021-02-13 RX ADMIN — CALCITRIOL CAPSULES 0.25 MCG SCH MCG: 0.25 CAPSULE ORAL at 08:32

## 2021-02-13 RX ADMIN — METOCLOPRAMIDE SCH MG: 5 INJECTION, SOLUTION INTRAMUSCULAR; INTRAVENOUS at 10:07

## 2021-02-13 RX ADMIN — DOCUSATE SODIUM SCH MG: 100 CAPSULE, LIQUID FILLED ORAL at 08:31

## 2021-02-13 RX ADMIN — Medication SCH MG: at 08:32

## 2021-02-13 RX ADMIN — PANTOPRAZOLE SODIUM SCH MG: 40 TABLET, DELAYED RELEASE ORAL at 16:09

## 2021-02-13 RX ADMIN — HUMAN INSULIN SCH UNITS: 100 INJECTION, SUSPENSION SUBCUTANEOUS at 08:30

## 2021-02-13 RX ADMIN — Medication SCH MG: at 13:59

## 2021-02-13 RX ADMIN — Medication SCH PATCH: at 08:31

## 2021-02-13 RX ADMIN — PANTOPRAZOLE SODIUM SCH MG: 40 TABLET, DELAYED RELEASE ORAL at 08:32

## 2021-02-13 RX ADMIN — ZINC SULFATE CAP 220 MG (50 MG ELEMENTAL ZN) SCH MG: 220 (50 ZN) CAP at 08:33

## 2021-02-13 RX ADMIN — Medication SCH ML: at 08:33

## 2021-02-13 RX ADMIN — FOLIC ACID SCH MG: 1 TABLET ORAL at 08:32

## 2021-02-13 RX ADMIN — ATORVASTATIN CALCIUM SCH MG: 10 TABLET, FILM COATED ORAL at 20:34

## 2021-02-13 NOTE — P.PN
Date of Service: 02/13/21





 Subjective 


Subjective: 


Patient with no significant changes.  Will continue to wean down oxygenation 

today.  Speak with daughter regarding plan of care





 Physical Examination 





- Vital Signs


Reviewed





- Physical Exam


General: Alert, In no apparent distress, Demented


Respiratory:  Diminished breath sound bilaterally; otherwise basilar crackles


Cardiovascular: Regular rate/rhythm, Normal S1 S2, No murmurs


Gastrointestinal: Normal bowel sounds, Soft and benign, Non-distended, No 

tenderness


Musculoskeletal: No clubbing, No swelling, No tenderness


Neurological: Sensation intact, Cranial nerves 3-12 intact





 Assessment & Plan 





- Problems (Diagnosis)


(1) Pneumonia due to COVID-19 virus


Current Visit: Yes   Status: Acute   





(2) YANET (acute kidney injury)


Current Visit: Yes   Status: Acute   





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


Current Visit: Yes   Status: Acute   





(4) UTI (urinary tract infection)


Onset Date: 09/18/17   Current Visit: No   Status: Acute   





(5) Weakness generalized


Onset Date: 09/18/17   Current Visit: No   Status: Acute   





(6) Hypertension


Current Visit: Yes   Status: Acute   





(7) Type 2 diabetes mellitus


Current Visit: Yes   Status: Acute   





(8) CKD (chronic kidney disease)


Current Visit: Yes   Status: Acute   





(9) Leukocytosis


Onset Date: 09/18/17   Current Visit: No   Status: Acute   





- Plan


Continue with plan of care as mentioned below


1. Hydration and renal function are improved; repeat labs in the morning; 

encourage oral intake and decreased IV hydration


2. Need to continue to wean down steroids;


3. Patient is a do not resuscitate; discuss with family regarding plan of care


4. Advanced diet as tolerated


5. Encourage physical therapy starting Monday


6. GI and DVT prophylaxis

## 2021-02-13 NOTE — P.PN
Date of Service: 02/13/21


Vital Signs











Temp Pulse Resp BP Pulse Ox


 


 97.8 F   62   20   148/72 H  99 


 


 02/13/21 12:00  02/13/21 12:00  02/13/21 12:00  02/13/21 12:00  02/13/21 12:00





Medications





Acetaminophen (Acetaminophen 500 Mg Tab)  500 mg PO Q6H PRN


   PRN Reason: TEMP > 100' F


   Stop: 02/19/21 22:35


   Last Admin: 02/12/21 11:07 Dose:  500 mg


   Documented by: 


Amlodipine Besylate (Amlodipine 10 Mg Tab)  10 mg PO DAILY [facility]


   Stop: 02/22/21 09:01


   Last Admin: 02/13/21 08:32 Dose:  10 mg


   Documented by: 


Ascorbic Acid (Ascorbic Acid 500 Mg Tablet)  500 mg PO TID [facility]


   Stop: 03/04/21 14:01


   Last Admin: 02/13/21 08:32 Dose:  500 mg


   Documented by: 


Atorvastatin Calcium (Atorvastatin 10 Mg Tab)  10 mg PO BEDTIME [facility]


   Stop: 03/03/21 21:01


   Last Admin: 02/12/21 21:08 Dose:  10 mg


   Documented by: 


Benzonatate (Benzonatate 100 Mg Cap)  200 mg PO Q4HP PRN


   PRN Reason: COUGH


   Stop: 02/25/21 20:11


   Last Admin: 02/10/21 21:41 Dose:  200 mg


   Documented by: 


Calcitriol (Calcitrol 0.25 Mcg Cap)  0.5 mcg PO DAILY [facility]


   Last Admin: 02/13/21 08:32 Dose:  0.5 mcg


   Documented by: 


Carvedilol (Carvedilol 12.5 Mg Tab)  12.5 mg PO DAILY [facility]


   Stop: 02/20/21 14:01


   Last Admin: 02/13/21 08:33 Dose:  12.5 mg


   Documented by: 


Cholecalciferol (Vitamin D 1000 Unit Tab)  2,000 unit PO DAILY [facility]


   Stop: 03/05/21 09:01


   Last Admin: 02/13/21 08:32 Dose:  2,000 unit


   Documented by: 


Dextrose (D50w 25 Gm/50 Ml Vial)  12.5 gm IV PRN PRN; Protocol


   PRN Reason: HYPOGLYCEMIA


Docusate Sodium (Docusate Na 100 Mg Cap)  100 mg PO DAILY [facility]


   Last Admin: 02/13/21 08:31 Dose:  100 mg


   Documented by: 


Enoxaparin Sodium (Enoxaparin 40 Mg/0.4 Ml)  40 mg SQ DAILY [facility]


   Last Admin: 02/13/21 08:31 Dose:  40 mg


   Documented by: 


Enteral Nutritional Formula (Glucerna Shake 237 Ml Can)  237 ml PO TIDWM [facility]


   Last Admin: 02/13/21 11:19 Dose:  237 ml


   Documented by: 


Fluconazole (Fluconazole 100 Mg Tab)  200 mg PO DAILY [facility]; Protocol


   Last Admin: 02/13/21 08:31 Dose:  200 mg


   Documented by: 


Folic Acid (Folic Acid 1 Mg Tablet)  1 mg PO DAILY [facility]


   Stop: 03/05/21 09:01


   Last Admin: 02/13/21 08:32 Dose:  1 mg


   Documented by: 


Glucagon (Glucagon 1 Mg/Vial)  1 mg IM 1X PRN; Protocol


   PRN Reason: HYPOGLYCEMIA


Ceftriaxone Sodium/Sodium Chloride (Rocephin 1 Gm/10 Ml Swi Ivp)  1 gm in 10 mls

@ 600 mls/hr IV DAILY [facility]


   Last Admin: 02/13/21 08:29 Dose:  10 mls


   Documented by: 


Insulin Human Isoph/Insulin Regular (Insulin 70/30 100 Units/Ml)  10 unit SQ 

BIDAC [facility]


   Last Admin: 02/13/21 08:30 Dose:  10 units


   Documented by: 


Lactulose (Lactulose 20 Gm/30 Ml Ucup)  10 gm PO BID PRN


   PRN Reason: CONSTIPATION


   Last Admin: 02/06/21 17:26 Dose:  10 gm


   Documented by: 


Lidocaine (Lidocaine 4% Patch)  1 patch TOP DAILY [facility]


   Stop: 02/22/21 20:01


   Last Admin: 02/13/21 08:31 Dose:  1 patch


   Documented by: 


Methylprednisolone Sodium Succinate (Methylprednisolone 125 Mg Inj)  80 mg IV 

BID [facility]


   Stop: 03/04/21 14:01


   Last Admin: 02/13/21 08:33 Dose:  80 mg


   Documented by: 


Metoclopramide HCl (Metoclopramide 10 Mg/2ml Inj)  10 mg IV 1X [facility]


   Last Admin: 02/13/21 10:07 Dose:  10 mg


   Documented by: 


Morphine Sulfate (Morphine 2 Mg/Ml Syr)  2 mg IV Q6H PRN


   PRN Reason: Pain scale 5-7 (Moderate)


Pantoprazole Sodium (Pantoprazole 40mg Tablet)  40 mg PO BIDAC [facility]; Protocol


   Stop: 03/02/21 09:01


   Last Admin: 02/13/21 08:32 Dose:  40 mg


   Documented by: 


Quetiapine Fumarate (Quetiapine 25 Mg Tab)  25 mg PO BID [facility]


   Stop: 02/20/21 21:01


   Last Admin: 02/13/21 07:15 Dose:  25 mg


   Documented by: 


Sodium Chloride (Flush Normal Saline 10 Ml)  10 ml IV BID [facility]


   Stop: 02/19/21 22:35


   Last Admin: 02/13/21 08:33 Dose:  10 ml


   Documented by: 


Sodium Chloride (Sodium Chloride 0.9% 10ml Inj)  10 ml IV UD PRN


   PRN Reason: Diluant


   Stop: 02/28/21 16:17


Sterile Water (Water For Inj,Sterile 10 Ml)  1.2 ml IM UD PRN


   PRN Reason: DILUTION OF MED


Tamsulosin HCl (Tamsulosin 0.4 Mg Sr Cap)  0.4 mg PO BEDTIME [facility]


   Stop: 02/20/21 21:01


   Last Admin: 02/12/21 21:08 Dose:  0.4 mg


   Documented by: 


Thiamine HCl (Thiamine Hcl 100 Mg Tablet)  200 mg PO BID [facility]


   Stop: 03/04/21 21:01


   Last Admin: 02/13/21 08:32 Dose:  200 mg


   Documented by: 


Zinc Sulfate (Zinc Sulfate 220 Mg Cap)  220 mg PO DAILY [facility]


   Stop: 03/05/21 09:01


   Last Admin: 02/13/21 08:33 Dose:  220 mg


   Documented by: 


Ziprasidone (Ziprasidone Mesyla 20 Mg/Vial)  10 mg IM Q6H PRN


   PRN Reason: AGITATION


Microbiology Results





01/20/21 18:00   Blood  - Blood   Aerobic Blood Culture - Final


                            No growth in 5 days.


01/20/21 18:00   Blood  - Blood   Anaerobic Blood Culture - Final


                            No growth in 5 days.


01/20/21 17:00   Blood  - Blood   Aerobic Blood Culture - Final


                            No growth in 5 days.


01/20/21 17:00   Blood  - Blood   Anaerobic Blood Culture - Final


                            No growth in 5 days.


01/20/21 18:30   Clean Catch Urine   [place] Count - Final


                            No growth.


01/20/21 18:30   Clean Catch Urine    - Final


                            No growth.





Assessment/ Plan: 


Nephrology





No acute cardiac or pulmonary complaints.


No CP.  Dyspnea/ COOPER with persistent hypoxia.


Weakness and fatigue


No acute events overnight.





Vitals, medications, blood work and imaging reviewed in the chart.


NAD.  MMM.  Neck supple.  CTA.  RRR.  Soft Abd.  No C/C/E.  No rash.  AAO.  

Normal Speech. 





A/P:  Continue the current POC and Medications other than the changes listed.  

AM Labs PRN.  Recommend daily weight.  Please see the orders for complete 

details.





YANET


CKD 3B with protienuria


-Hold IVF


-No NSAIDs





Hyponatremia


-Hold IVF





Hypocalcemia


-Continue Vitamin D


-Continue Calcitriol





HTN with CKD/ CHF


-Continue Amlodipine and Coreg





Diastolic CHF, chronic


-Continue Coreg





DM II with CKD


-Wean steroids as tolerated





Moderate malnutrition


-Encourage nutrition


-Recommend protein supplementation





Anemia in chronic illness


GI bleed


-Retacrit PRN





BPH with LUTS


-Continue Flomax





Acute hypoxic respiratory failure due to COVID-19


-Continue steroid therapy


-Wean Oxygen as tolerated.

## 2021-02-14 LAB
BUN BLD-MCNC: 48 MG/DL (ref 7–18)
CRP SERPL-MCNC: < 2.9 MG/L (ref ?–3)
FERRITIN SERPL-MCNC: 374.2 NG/ML (ref 26–388)
GLUCOSE SERPLBLD-MCNC: 146 MG/DL (ref 74–106)
HCT VFR BLD CALC: 29.6 % (ref 39.6–49)
LYMPHOCYTES # SPEC AUTO: 0.5 K/UL (ref 0.7–4.9)
MAGNESIUM SERPL-MCNC: 2.4 MG/DL (ref 1.8–2.4)
MORPHOLOGY BLD-IMP: (no result)
NT-PROBNP SERPL-MCNC: 1472 PG/ML (ref ?–450)
PMV BLD: 9.2 FL (ref 7.6–11.3)
POTASSIUM SERPL-SCNC: 5.3 MMOL/L (ref 3.5–5.1)
RBC # BLD: 3.35 M/UL (ref 4.33–5.43)

## 2021-02-14 RX ADMIN — HUMAN INSULIN SCH UNITS: 100 INJECTION, SUSPENSION SUBCUTANEOUS at 17:10

## 2021-02-14 RX ADMIN — METHYLPREDNISOLONE SODIUM SUCCINATE SCH MG: 125 INJECTION, POWDER, FOR SOLUTION INTRAMUSCULAR; INTRAVENOUS at 08:45

## 2021-02-14 RX ADMIN — Medication SCH MG: at 20:14

## 2021-02-14 RX ADMIN — CHOLECALCIFEROL TAB 25 MCG (1000 UNIT) SCH UNIT: 25 TAB at 08:44

## 2021-02-14 RX ADMIN — FOLIC ACID SCH MG: 1 TABLET ORAL at 08:44

## 2021-02-14 RX ADMIN — Medication SCH MG: at 14:27

## 2021-02-14 RX ADMIN — METOCLOPRAMIDE SCH: 5 INJECTION, SOLUTION INTRAMUSCULAR; INTRAVENOUS at 08:53

## 2021-02-14 RX ADMIN — MORPHINE SULFATE PRN MG: 2 INJECTION, SOLUTION INTRAMUSCULAR; INTRAVENOUS at 21:10

## 2021-02-14 RX ADMIN — HUMAN INSULIN SCH UNITS: 100 INJECTION, SUSPENSION SUBCUTANEOUS at 08:43

## 2021-02-14 RX ADMIN — ENOXAPARIN SODIUM SCH MG: 40 INJECTION SUBCUTANEOUS at 20:14

## 2021-02-14 RX ADMIN — FLUCONAZOLE SCH MG: 100 TABLET ORAL at 08:44

## 2021-02-14 RX ADMIN — Medication SCH ML: at 08:49

## 2021-02-14 RX ADMIN — ATORVASTATIN CALCIUM SCH MG: 10 TABLET, FILM COATED ORAL at 20:13

## 2021-02-14 RX ADMIN — Medication SCH MG: at 09:00

## 2021-02-14 RX ADMIN — DOCUSATE SODIUM SCH MG: 100 CAPSULE, LIQUID FILLED ORAL at 08:48

## 2021-02-14 RX ADMIN — MORPHINE SULFATE PRN MG: 2 INJECTION, SOLUTION INTRAMUSCULAR; INTRAVENOUS at 01:54

## 2021-02-14 RX ADMIN — ENOXAPARIN SODIUM SCH MG: 40 INJECTION SUBCUTANEOUS at 10:55

## 2021-02-14 RX ADMIN — QUETIAPINE SCH MG: 25 TABLET ORAL at 08:43

## 2021-02-14 RX ADMIN — Medication SCH ML: at 12:00

## 2021-02-14 RX ADMIN — AMLODIPINE BESYLATE SCH MG: 10 TABLET ORAL at 08:44

## 2021-02-14 RX ADMIN — PANTOPRAZOLE SODIUM SCH MG: 40 TABLET, DELAYED RELEASE ORAL at 08:44

## 2021-02-14 RX ADMIN — Medication SCH ML: at 17:00

## 2021-02-14 RX ADMIN — PANTOPRAZOLE SODIUM SCH MG: 40 TABLET, DELAYED RELEASE ORAL at 17:11

## 2021-02-14 RX ADMIN — METHYLPREDNISOLONE SODIUM SUCCINATE SCH MG: 125 INJECTION, POWDER, FOR SOLUTION INTRAMUSCULAR; INTRAVENOUS at 20:13

## 2021-02-14 RX ADMIN — Medication SCH MG: at 08:44

## 2021-02-14 RX ADMIN — MORPHINE SULFATE PRN MG: 2 INJECTION, SOLUTION INTRAMUSCULAR; INTRAVENOUS at 15:05

## 2021-02-14 RX ADMIN — QUETIAPINE SCH MG: 25 TABLET ORAL at 20:13

## 2021-02-14 RX ADMIN — Medication SCH ML: at 20:14

## 2021-02-14 RX ADMIN — CALCITRIOL CAPSULES 0.25 MCG SCH MCG: 0.25 CAPSULE ORAL at 08:43

## 2021-02-14 RX ADMIN — Medication SCH PATCH: at 08:48

## 2021-02-14 RX ADMIN — Medication SCH ML: at 08:00

## 2021-02-14 RX ADMIN — ZINC SULFATE CAP 220 MG (50 MG ELEMENTAL ZN) SCH MG: 220 (50 ZN) CAP at 08:44

## 2021-02-14 RX ADMIN — CEFTRIAXONE SCH MLS: 1 INJECTION, SOLUTION INTRAVENOUS at 08:48

## 2021-02-14 RX ADMIN — TAMSULOSIN HYDROCHLORIDE SCH MG: 0.4 CAPSULE ORAL at 20:13

## 2021-02-14 RX ADMIN — Medication SCH MG: at 20:13

## 2021-02-14 NOTE — RAD REPORT
EXAM DESCRIPTION:  Anastasia Single View2/14/2021 6:56 am

 

CLINICAL HISTORY:  Chest pain

 

COMPARISON:  February 10th

 

FINDINGS:  Mild worsening in extensive bilateral pulmonary opacities

 

Heart remains enlarged

 

IMPRESSION:  Mild worsening in extensive bilateral pulmonary opacities which may represent pneumonia 
or pulmonary edema

## 2021-02-14 NOTE — P.PN
Subjective


Date of Service: 02/14/21


Chief Complaint: Respiratory failure from corona virus


Seems to be improving renal function is better continue to wean down on the 

oxygen





Review of Systems


General: Weakness


Respiratory: Shortness of Breath





Physical Examination





- Vital Signs


Temperature: 97 F


Blood Pressure: 134/62


Pulse: 62


Respirations: 20


Pulse Ox (%): 96





- Studies


Medications List Reviewed: Yes





Assessment & Plan





- Problems (Diagnosis)


(1) Acute respiratory failure due to severe acute respiratory syndrome 

coronavirus 2 (SARS-CoV-2) infection


Current Visit: Yes   Status: Acute   


Plan: 


Respiratory failure from corona virus renal function is improved white count is 

declining patient is not eating and drinking much reduce dose of Solu-Medrol 

Cr another dose of ivermectin resume DVT prophylaxis hemoglobin stable high 

risk for thromboembolism





Physician Review Additional Text: 





Initial chief complaint:


Shortness of breath secondary to COVID 19





Physical Exam: 


Patient is alert. Dementia noted. 


Heart: normal rythym


Lung: he is on nonrebreather.


GI: soft, nontender, nondistended


EXT: good range of motion. 





Impression:


Acute respiratory failure with hypoxia secondary to bilateral COVID 19 pneumonia


Alzheimer dementia


HTN


DM Type 2 


BPH


Hyperlipidemia





Plan: 


Acute respiratory failure with hypoxia secondary to bilateral COVID 19 

pneumonia:  Patient continues to show slow improvement.  Chest x-ray shows 

improvement.  Patient on BiPAP at 55% FiO2.  Respiratory to continue to wean 

off.  Patient had been on non-rebreather.  Plan is for high-flow then transition

to nasal cannula.  Pulmonology added Lasix. Physical therapy ordered.  Encourage

incentive spirometer. Family wants for patient to return back at discharge. 

Family does not desire for him to go to a skilled facility.  Continue with IV 

steroids and supplementation.  Anticipate home in the next 72 hr.  I will turn 

the service over to the hospitalist team tomorrow.  I will go plan of care with 

him.


Alzheimer dementia:  Overall stable.  Continue with mood stabilizers.


HTN:  Continue medication


DM Type 2:  Continue Accu-Cheks.  Continue to adjust medication.  Maintain 

adequate control of blood sugar.


BPH:  Continue medication


Hyperlipidemia: continue medication

## 2021-02-15 RX ADMIN — MORPHINE SULFATE PRN MG: 2 INJECTION, SOLUTION INTRAMUSCULAR; INTRAVENOUS at 05:29

## 2021-02-15 RX ADMIN — Medication SCH ML: at 20:04

## 2021-02-15 RX ADMIN — ENOXAPARIN SODIUM SCH MG: 40 INJECTION SUBCUTANEOUS at 20:03

## 2021-02-15 RX ADMIN — Medication SCH ML: at 16:42

## 2021-02-15 RX ADMIN — Medication SCH MG: at 20:02

## 2021-02-15 RX ADMIN — Medication SCH PATCH: at 09:04

## 2021-02-15 RX ADMIN — QUETIAPINE SCH MG: 25 TABLET ORAL at 09:03

## 2021-02-15 RX ADMIN — FUROSEMIDE SCH MG: 10 INJECTION, SOLUTION INTRAVENOUS at 04:51

## 2021-02-15 RX ADMIN — Medication SCH MG: at 20:01

## 2021-02-15 RX ADMIN — METHYLPREDNISOLONE SODIUM SUCCINATE SCH MG: 125 INJECTION, POWDER, FOR SOLUTION INTRAMUSCULAR; INTRAVENOUS at 09:05

## 2021-02-15 RX ADMIN — CEFTRIAXONE SCH MLS: 1 INJECTION, SOLUTION INTRAVENOUS at 09:04

## 2021-02-15 RX ADMIN — AMLODIPINE BESYLATE SCH MG: 10 TABLET ORAL at 09:03

## 2021-02-15 RX ADMIN — ATORVASTATIN CALCIUM SCH MG: 10 TABLET, FILM COATED ORAL at 20:00

## 2021-02-15 RX ADMIN — HUMAN INSULIN SCH UNITS: 100 INJECTION, SUSPENSION SUBCUTANEOUS at 09:06

## 2021-02-15 RX ADMIN — CHOLECALCIFEROL TAB 25 MCG (1000 UNIT) SCH UNIT: 25 TAB at 09:00

## 2021-02-15 RX ADMIN — FUROSEMIDE SCH MG: 10 INJECTION, SOLUTION INTRAVENOUS at 09:04

## 2021-02-15 RX ADMIN — Medication SCH MG: at 09:06

## 2021-02-15 RX ADMIN — CALCITRIOL CAPSULES 0.25 MCG SCH MCG: 0.25 CAPSULE ORAL at 09:04

## 2021-02-15 RX ADMIN — ZINC SULFATE CAP 220 MG (50 MG ELEMENTAL ZN) SCH MG: 220 (50 ZN) CAP at 09:03

## 2021-02-15 RX ADMIN — FOLIC ACID SCH MG: 1 TABLET ORAL at 09:04

## 2021-02-15 RX ADMIN — HUMAN INSULIN SCH UNITS: 100 INJECTION, SUSPENSION SUBCUTANEOUS at 16:41

## 2021-02-15 RX ADMIN — Medication SCH MG: at 09:04

## 2021-02-15 RX ADMIN — METHYLPREDNISOLONE SODIUM SUCCINATE SCH MG: 125 INJECTION, POWDER, FOR SOLUTION INTRAMUSCULAR; INTRAVENOUS at 20:02

## 2021-02-15 RX ADMIN — FAMOTIDINE SCH MG: 20 TABLET, FILM COATED ORAL at 20:01

## 2021-02-15 RX ADMIN — Medication SCH ML: at 08:00

## 2021-02-15 RX ADMIN — ENOXAPARIN SODIUM SCH MG: 40 INJECTION SUBCUTANEOUS at 09:05

## 2021-02-15 RX ADMIN — Medication SCH ML: at 09:00

## 2021-02-15 RX ADMIN — Medication SCH: at 12:00

## 2021-02-15 RX ADMIN — QUETIAPINE SCH MG: 25 TABLET ORAL at 20:02

## 2021-02-15 RX ADMIN — FLUCONAZOLE SCH MG: 100 TABLET ORAL at 09:00

## 2021-02-15 RX ADMIN — TAMSULOSIN HYDROCHLORIDE SCH MG: 0.4 CAPSULE ORAL at 20:01

## 2021-02-15 RX ADMIN — PANTOPRAZOLE SODIUM SCH MG: 40 TABLET, DELAYED RELEASE ORAL at 09:06

## 2021-02-15 RX ADMIN — Medication SCH MG: at 15:06

## 2021-02-15 RX ADMIN — DOCUSATE SODIUM SCH MG: 100 CAPSULE, LIQUID FILLED ORAL at 09:05

## 2021-02-15 NOTE — P.PN
Date of Service: 02/14/21





 Subjective 


Subjective: 


Patient with no significant changes. Family wanted to take patient home. Will 

discuss with family regarding patient needing to go to a skilled nursing 

facility. They have been reluctant; however, she will require quite  a bit of 

care going forward. 





 Physical Examination 





- Vital Signs


Reviewed





- Physical Exam


General: Alert, In no apparent distress, Demented


Respiratory:  Diminished breath sound bilaterally; otherwise basilar crackles


Cardiovascular: Regular rate/rhythm, Normal S1 S2, No murmurs


Gastrointestinal: Normal bowel sounds, Soft and benign, Non-distended, No 

tenderness


Musculoskeletal: No clubbing, No swelling, No tenderness





 Assessment & Plan 





- Problems (Diagnosis)


(1) Pneumonia due to COVID-19 virus; severe hypoxemia


Current Visit: Yes   Status: Acute   





(2) YANET (acute kidney injury)


Current Visit: Yes   Status: Acute   





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


Current Visit: Yes   Status: Acute   





(4) UTI (urinary tract infection)


Onset Date: 09/18/17   Current Visit: No   Status: Acute   





(5) Weakness generalized


Onset Date: 09/18/17   Current Visit: No   Status: Acute   





(6) Hypertension


Current Visit: Yes   Status: Acute   





(7) Type 2 diabetes mellitus


Current Visit: Yes   Status: Acute   





(8) CKD (chronic kidney disease)


Current Visit: Yes   Status: Acute   





(9) Leukocytosis


Onset Date: 09/18/17   Current Visit: No   Status: Acute   





(10) LGIB; hemorrhoidal bleeding


Onset Date: 09/18/17   Current Visit: No   Status: Acute   





- Plan


Continue with plan of care as mentioned below


1. Hydration and renal function are improved; heplock IV; repeat labs as needed


2. Need to continue to wean down steroids;


3. Patient is a do not resuscitate; need to readdress and discuss with family 

regarding plan of care


4. Advanced diet as tolerated


5. Encourage physical therapy on Monday


6. GI and DVT prophylaxis

## 2021-02-15 NOTE — P.PN
Subjective


Date of Service: 02/15/21


Chief Complaint: Respiratory failure from corona virus


Oxygenation improving. Renal function  has improved





Review of Systems


General: Weakness


Respiratory: Shortness of Breath





Physical Examination





- Vital Signs


Temperature: 97.2 F


Blood Pressure: 137/71


Pulse: 69


Respirations: 15


Pulse Ox (%): 98





- Studies


Medications List Reviewed: Yes





Assessment & Plan





- Problems (Diagnosis)


(1) Acute respiratory failure due to severe acute respiratory syndrome 

coronavirus 2 (SARS-CoV-2) infection


Current Visit: Yes   Status: Acute   


Plan: 


RResp fialure CW titration down on Fio2 sat of 88-90%. Renal funtion poss back 

to baseline BS reasonable DW respiratory





Physician Review Additional Text: 





Initial chief complaint:


Shortness of breath secondary to COVID 19





Physical Exam: 


Patient is alert. Dementia noted. 


Heart: normal rythym


Lung: he is on nonrebreather.


GI: soft, nontender, nondistended


EXT: good range of motion. 





Impression:


Acute respiratory failure with hypoxia secondary to bilateral COVID 19 pneumonia


Alzheimer dementia


HTN


DM Type 2 


BPH


Hyperlipidemia





Plan: 


Acute respiratory failure with hypoxia secondary to bilateral COVID 19 pneumonia

:  Patient continues term main stable on non-rebreather.  No significant change 

over the last week.  Will discuss with family about the possibility of skilled 

placement.  Continue with IV steroids and supplementation.  Continue to reassess

and monitor


Alzheimer dementia:  Overall stable.  Continue with mood stabilizers.


HTN:  Continue medication


DM Type 2:  Continue Accu-Cheks.  Continue to adjust medication.  Maintain 

adequate control of blood sugar.


BPH:  Continue medication


Hyperlipidemia: continue medication

## 2021-02-15 NOTE — P.PN
Subjective


Date of Service: 02/15/21


Chief Complaint: Respiratory failure from corona virus


Subjective: Other (Patient stable on non-rebreather.)





Physical Examination





- Vital Signs


Temperature: 97.2 F


Blood Pressure: 137/71


Pulse: 69


Respirations: 15


Pulse Ox (%): 98





- Studies


Medications List Reviewed: Yes





Assessment & Plan


Discharge Plan: Other (Skilled nursing facility versus home)


Plan to discharge in: Greater than 2 days


Physician Review Additional Text: 





Initial chief complaint:


Shortness of breath secondary to COVID 19





Physical Exam: 


Patient is alert. Dementia noted. 


Heart: normal rythym


Lung: he is on nonrebreather.


GI: soft, nontender, nondistended


EXT: good range of motion. 





Impression:


Acute respiratory failure with hypoxia secondary to bilateral COVID 19 pneumonia


Alzheimer dementia


HTN


DM Type 2 


BPH


Hyperlipidemia





Plan: 


Acute respiratory failure with hypoxia secondary to bilateral COVID 19 

pneumonia:  Patient continues term main stable on non-rebreather.  No 

significant change over the last week.  Will discuss with family about the 

possibility of skilled placement.  Continue with IV steroids and 

supplementation.  Continue to reassess and monitor


Alzheimer dementia:  Overall stable.  Continue with mood stabilizers.


HTN:  Continue medication


DM Type 2:  Continue Accu-Cheks.  Continue to adjust medication.  Maintain 

adequate control of blood sugar.


BPH:  Continue medication


Hyperlipidemia: continue medication


Time Spent Managing Pts Care (In Minutes): 55

## 2021-02-16 LAB
BUN BLD-MCNC: 49 MG/DL (ref 7–18)
CRP SERPL-MCNC: < 2.9 MG/L (ref ?–3)
FERRITIN SERPL-MCNC: 325.9 NG/ML (ref 26–388)
GLUCOSE SERPLBLD-MCNC: 120 MG/DL (ref 74–106)
HCT VFR BLD CALC: 31.2 % (ref 39.6–49)
LYMPHOCYTES # SPEC AUTO: 0.6 K/UL (ref 0.7–4.9)
PMV BLD: 9.4 FL (ref 7.6–11.3)
POTASSIUM SERPL-SCNC: 4.5 MMOL/L (ref 3.5–5.1)
RBC # BLD: 3.49 M/UL (ref 4.33–5.43)

## 2021-02-16 RX ADMIN — FAMOTIDINE SCH MG: 20 TABLET, FILM COATED ORAL at 19:55

## 2021-02-16 RX ADMIN — ZINC SULFATE CAP 220 MG (50 MG ELEMENTAL ZN) SCH MG: 220 (50 ZN) CAP at 09:50

## 2021-02-16 RX ADMIN — Medication SCH: at 17:00

## 2021-02-16 RX ADMIN — DOCUSATE SODIUM SCH MG: 100 CAPSULE, LIQUID FILLED ORAL at 09:52

## 2021-02-16 RX ADMIN — Medication SCH MG: at 19:56

## 2021-02-16 RX ADMIN — ENOXAPARIN SODIUM SCH MG: 40 INJECTION SUBCUTANEOUS at 19:55

## 2021-02-16 RX ADMIN — AMLODIPINE BESYLATE SCH MG: 10 TABLET ORAL at 09:50

## 2021-02-16 RX ADMIN — Medication SCH ML: at 09:00

## 2021-02-16 RX ADMIN — CHOLECALCIFEROL TAB 25 MCG (1000 UNIT) SCH UNIT: 25 TAB at 09:50

## 2021-02-16 RX ADMIN — CALCITRIOL CAPSULES 0.25 MCG SCH MCG: 0.25 CAPSULE ORAL at 09:50

## 2021-02-16 RX ADMIN — QUETIAPINE SCH MG: 25 TABLET ORAL at 19:56

## 2021-02-16 RX ADMIN — METHYLPREDNISOLONE SODIUM SUCCINATE SCH MG: 125 INJECTION, POWDER, FOR SOLUTION INTRAMUSCULAR; INTRAVENOUS at 09:51

## 2021-02-16 RX ADMIN — Medication SCH: at 14:00

## 2021-02-16 RX ADMIN — Medication SCH: at 08:00

## 2021-02-16 RX ADMIN — METHYLPREDNISOLONE SODIUM SUCCINATE SCH MG: 125 INJECTION, POWDER, FOR SOLUTION INTRAMUSCULAR; INTRAVENOUS at 19:55

## 2021-02-16 RX ADMIN — HUMAN INSULIN SCH UNITS: 100 INJECTION, SUSPENSION SUBCUTANEOUS at 09:54

## 2021-02-16 RX ADMIN — Medication SCH MG: at 09:51

## 2021-02-16 RX ADMIN — Medication SCH: at 12:00

## 2021-02-16 RX ADMIN — Medication SCH PATCH: at 09:49

## 2021-02-16 RX ADMIN — HUMAN INSULIN SCH: 100 INJECTION, SUSPENSION SUBCUTANEOUS at 16:30

## 2021-02-16 RX ADMIN — FAMOTIDINE SCH MG: 20 TABLET, FILM COATED ORAL at 09:51

## 2021-02-16 RX ADMIN — ENOXAPARIN SODIUM SCH MG: 40 INJECTION SUBCUTANEOUS at 09:52

## 2021-02-16 RX ADMIN — FLUCONAZOLE SCH MG: 100 TABLET ORAL at 09:49

## 2021-02-16 RX ADMIN — FOLIC ACID SCH MG: 1 TABLET ORAL at 09:49

## 2021-02-16 RX ADMIN — ATORVASTATIN CALCIUM SCH MG: 10 TABLET, FILM COATED ORAL at 19:56

## 2021-02-16 RX ADMIN — TAMSULOSIN HYDROCHLORIDE SCH MG: 0.4 CAPSULE ORAL at 19:56

## 2021-02-16 RX ADMIN — Medication SCH MG: at 09:50

## 2021-02-16 RX ADMIN — QUETIAPINE SCH MG: 25 TABLET ORAL at 09:51

## 2021-02-16 RX ADMIN — Medication SCH ML: at 19:56

## 2021-02-17 LAB
BUN BLD-MCNC: 49 MG/DL (ref 7–18)
CRP SERPL-MCNC: 15.5 MG/L (ref ?–3)
FERRITIN SERPL-MCNC: 339.4 NG/ML (ref 26–388)
GLUCOSE SERPLBLD-MCNC: 177 MG/DL (ref 74–106)
HCT VFR BLD CALC: 30.4 % (ref 39.6–49)
LYMPHOCYTES # SPEC AUTO: 0.4 K/UL (ref 0.7–4.9)
PMV BLD: 9.2 FL (ref 7.6–11.3)
POTASSIUM SERPL-SCNC: 5 MMOL/L (ref 3.5–5.1)
RBC # BLD: 3.4 M/UL (ref 4.33–5.43)

## 2021-02-17 RX ADMIN — CHOLECALCIFEROL TAB 25 MCG (1000 UNIT) SCH UNIT: 25 TAB at 09:40

## 2021-02-17 RX ADMIN — Medication SCH: at 12:00

## 2021-02-17 RX ADMIN — DOCUSATE SODIUM SCH MG: 100 CAPSULE, LIQUID FILLED ORAL at 09:40

## 2021-02-17 RX ADMIN — Medication SCH MG: at 21:17

## 2021-02-17 RX ADMIN — Medication SCH: at 19:27

## 2021-02-17 RX ADMIN — QUETIAPINE SCH MG: 25 TABLET ORAL at 09:41

## 2021-02-17 RX ADMIN — AMLODIPINE BESYLATE SCH: 10 TABLET ORAL at 09:00

## 2021-02-17 RX ADMIN — Medication SCH MG: at 09:41

## 2021-02-17 RX ADMIN — FAMOTIDINE SCH: 20 TABLET, FILM COATED ORAL at 19:27

## 2021-02-17 RX ADMIN — QUETIAPINE SCH: 25 TABLET ORAL at 19:53

## 2021-02-17 RX ADMIN — FOLIC ACID SCH MG: 1 TABLET ORAL at 09:40

## 2021-02-17 RX ADMIN — CALCITRIOL CAPSULES 0.25 MCG SCH MCG: 0.25 CAPSULE ORAL at 09:40

## 2021-02-17 RX ADMIN — DEXTROSE AND SODIUM CHLORIDE SCH MLS: 5; .45 INJECTION, SOLUTION INTRAVENOUS at 17:14

## 2021-02-17 RX ADMIN — Medication SCH: at 09:00

## 2021-02-17 RX ADMIN — DOCUSATE SODIUM SCH: 100 CAPSULE, LIQUID FILLED ORAL at 09:00

## 2021-02-17 RX ADMIN — Medication SCH: at 14:00

## 2021-02-17 RX ADMIN — CALCITRIOL CAPSULES 0.25 MCG SCH: 0.25 CAPSULE ORAL at 09:00

## 2021-02-17 RX ADMIN — HUMAN INSULIN SCH: 100 INJECTION, SUSPENSION SUBCUTANEOUS at 16:30

## 2021-02-17 RX ADMIN — ATORVASTATIN CALCIUM SCH: 10 TABLET, FILM COATED ORAL at 19:27

## 2021-02-17 RX ADMIN — AMLODIPINE BESYLATE SCH MG: 10 TABLET ORAL at 09:41

## 2021-02-17 RX ADMIN — ENOXAPARIN SODIUM SCH MG: 40 INJECTION SUBCUTANEOUS at 09:41

## 2021-02-17 RX ADMIN — Medication SCH MG: at 09:40

## 2021-02-17 RX ADMIN — FOLIC ACID SCH: 1 TABLET ORAL at 09:00

## 2021-02-17 RX ADMIN — QUETIAPINE SCH MG: 25 TABLET ORAL at 21:17

## 2021-02-17 RX ADMIN — ZINC SULFATE CAP 220 MG (50 MG ELEMENTAL ZN) SCH MG: 220 (50 ZN) CAP at 09:40

## 2021-02-17 RX ADMIN — METHYLPREDNISOLONE SODIUM SUCCINATE SCH MG: 125 INJECTION, POWDER, FOR SOLUTION INTRAMUSCULAR; INTRAVENOUS at 09:41

## 2021-02-17 RX ADMIN — FLUCONAZOLE SCH MG: 100 TABLET ORAL at 09:40

## 2021-02-17 RX ADMIN — FAMOTIDINE SCH MG: 20 TABLET, FILM COATED ORAL at 09:40

## 2021-02-17 RX ADMIN — Medication SCH ML: at 08:00

## 2021-02-17 RX ADMIN — Medication SCH: at 08:00

## 2021-02-17 RX ADMIN — TAMSULOSIN HYDROCHLORIDE SCH MG: 0.4 CAPSULE ORAL at 21:16

## 2021-02-17 RX ADMIN — Medication SCH: at 17:00

## 2021-02-17 RX ADMIN — HUMAN INSULIN SCH UNITS: 100 INJECTION, SUSPENSION SUBCUTANEOUS at 09:39

## 2021-02-17 RX ADMIN — METHYLPREDNISOLONE SODIUM SUCCINATE SCH MG: 125 INJECTION, POWDER, FOR SOLUTION INTRAMUSCULAR; INTRAVENOUS at 20:00

## 2021-02-17 RX ADMIN — TAMSULOSIN HYDROCHLORIDE SCH: 0.4 CAPSULE ORAL at 19:27

## 2021-02-17 RX ADMIN — QUETIAPINE SCH: 25 TABLET ORAL at 09:00

## 2021-02-17 RX ADMIN — FAMOTIDINE SCH MG: 20 TABLET, FILM COATED ORAL at 21:17

## 2021-02-17 RX ADMIN — FAMOTIDINE SCH: 20 TABLET, FILM COATED ORAL at 09:00

## 2021-02-17 RX ADMIN — Medication SCH PATCH: at 09:40

## 2021-02-17 RX ADMIN — ENOXAPARIN SODIUM SCH MG: 40 INJECTION SUBCUTANEOUS at 20:00

## 2021-02-17 RX ADMIN — FLUCONAZOLE SCH: 100 TABLET ORAL at 09:00

## 2021-02-17 RX ADMIN — Medication SCH ML: at 09:00

## 2021-02-17 RX ADMIN — ATORVASTATIN CALCIUM SCH MG: 10 TABLET, FILM COATED ORAL at 21:17

## 2021-02-17 NOTE — P.PN
Subjective


Date of Service: 02/16/21


Chief Complaint: Respiratory failure from corona virus


[place] and requiring high levels of oxygen has not very responsive or 

cooperative not eating very much





Review of Systems


General: Weakness


Respiratory: Shortness of Breath





Physical Examination





- Vital Signs


Temperature: 96.9 F


Blood Pressure: 152/79


Pulse: 90


Respirations: 25


Pulse Ox (%): 96





- Studies


Medications List Reviewed: Yes





Assessment & Plan





- Problems (Diagnosis)


(1) Acute respiratory failure due to severe acute respiratory syndrome 

coronavirus 2 (SARS-CoV-2) infection


Current Visit: Yes   Status: Acute   


Plan: 


Respiratory failure continued to wean down his oxygen oxygen requirements have 

been improving patient is not eating much resume IV fluids white count normal

## 2021-02-17 NOTE — P.PN
Subjective


Date of Service: 02/17/21


Chief Complaint: Respiratory failure from corona virus


Subjective: Other (Mild agitation. Patient required some medication for 

agitation last night.  Patient given Haldol and Ativan.)





Physical Examination





- Vital Signs


Temperature: 97.2 F


Blood Pressure: 154/74


Pulse: 99


Respirations: 26


Pulse Ox (%): 96





- Studies


Medications List Reviewed: Yes





Assessment & Plan


Discharge Plan: Other


Plan to discharge in: Greater than 2 days


Physician Review Additional Text: 





Initial chief complaint:


Shortness of breath secondary to COVID 19





Physical Exam: 


Patient is alert. Dementia noted. Mild agitation noted today.


Heart: normal rythym


Lung: he is on nonrebreather.


GI: soft, nontender, nondistended


EXT: good range of motion. 





Impression:


Acute respiratory failure with hypoxia secondary to bilateral COVID 19 pneumonia


Alzheimer dementia


HTN


DM Type 2 


BPH


Hyperlipidemia





Plan: 


Acute respiratory failure with hypoxia secondary to bilateral COVID 19 

pneumonia:  Patient on BiPAP today.  Patient had some agitation Overnite.  

Patient required Haldol and Ativan.  Patient arousable.  Will monitor closely.  

Aspiration precaution in place.  Continue current medication.  Spoke with family

the other day.  They are in agreement with skilled placement.  Will discuss with

social work.  Patient needs to be able to get on nasal cannula pay for this can 

happen.  Slow progress noted.


Alzheimer dementia:  Overall stable.  Continue with mood stabilizers.


HTN:  Continue medication


DM Type 2:  Continue Accu-Cheks.  Continue to adjust medication.  Maintain 

adequate control of blood sugar.


BPH:  Continue medication


Hyperlipidemia: continue medication


Time Spent Managing Pts Care (In Minutes): 55

## 2021-02-18 LAB
BUN BLD-MCNC: 54 MG/DL (ref 7–18)
CRP SERPL-MCNC: 16.1 MG/L (ref ?–3)
FERRITIN SERPL-MCNC: 341.3 NG/ML (ref 26–388)
GLUCOSE SERPLBLD-MCNC: 217 MG/DL (ref 74–106)
HCT VFR BLD CALC: 29.3 % (ref 39.6–49)
HCT VFR BLD CALC: 31.5 % (ref 39.6–49)
INR BLD: 1.07
LYMPHOCYTES # SPEC AUTO: 0.3 K/UL (ref 0.7–4.9)
LYMPHOCYTES # SPEC AUTO: 0.3 K/UL (ref 0.7–4.9)
MORPHOLOGY BLD-IMP: (no result)
PMV BLD: 9.3 FL (ref 7.6–11.3)
PMV BLD: 9.6 FL (ref 7.6–11.3)
POTASSIUM SERPL-SCNC: 4.8 MMOL/L (ref 3.5–5.1)
RBC # BLD: 3.27 M/UL (ref 4.33–5.43)
RBC # BLD: 3.44 M/UL (ref 4.33–5.43)

## 2021-02-18 RX ADMIN — Medication SCH ML: at 09:00

## 2021-02-18 RX ADMIN — CHOLECALCIFEROL TAB 25 MCG (1000 UNIT) SCH: 25 TAB at 09:00

## 2021-02-18 RX ADMIN — Medication SCH: at 09:00

## 2021-02-18 RX ADMIN — METHYLPREDNISOLONE SODIUM SUCCINATE SCH MG: 125 INJECTION, POWDER, FOR SOLUTION INTRAMUSCULAR; INTRAVENOUS at 09:51

## 2021-02-18 RX ADMIN — FOLIC ACID SCH: 1 TABLET ORAL at 09:00

## 2021-02-18 RX ADMIN — FAMOTIDINE SCH MG: 20 TABLET, FILM COATED ORAL at 20:48

## 2021-02-18 RX ADMIN — HUMAN INSULIN SCH: 100 INJECTION, SUSPENSION SUBCUTANEOUS at 16:30

## 2021-02-18 RX ADMIN — Medication SCH PATCH: at 09:56

## 2021-02-18 RX ADMIN — CALCITRIOL CAPSULES 0.25 MCG SCH: 0.25 CAPSULE ORAL at 09:00

## 2021-02-18 RX ADMIN — METHYLPREDNISOLONE SODIUM SUCCINATE SCH MG: 125 INJECTION, POWDER, FOR SOLUTION INTRAMUSCULAR; INTRAVENOUS at 20:48

## 2021-02-18 RX ADMIN — Medication SCH: at 08:00

## 2021-02-18 RX ADMIN — DEXTROSE AND SODIUM CHLORIDE SCH: 5; .45 INJECTION, SOLUTION INTRAVENOUS at 06:20

## 2021-02-18 RX ADMIN — FAMOTIDINE SCH MG: 20 TABLET, FILM COATED ORAL at 09:00

## 2021-02-18 RX ADMIN — AMLODIPINE BESYLATE SCH MG: 10 TABLET ORAL at 09:56

## 2021-02-18 RX ADMIN — ATORVASTATIN CALCIUM SCH MG: 10 TABLET, FILM COATED ORAL at 20:48

## 2021-02-18 RX ADMIN — ENOXAPARIN SODIUM SCH MG: 40 INJECTION SUBCUTANEOUS at 09:51

## 2021-02-18 RX ADMIN — HUMAN INSULIN SCH UNITS: 100 INJECTION, SUSPENSION SUBCUTANEOUS at 09:55

## 2021-02-18 RX ADMIN — Medication SCH: at 20:49

## 2021-02-18 RX ADMIN — Medication SCH: at 13:31

## 2021-02-18 RX ADMIN — SODIUM CHLORIDE SCH MLS: 0.9 INJECTION, SOLUTION INTRAVENOUS at 07:30

## 2021-02-18 RX ADMIN — Medication SCH: at 12:00

## 2021-02-18 RX ADMIN — Medication SCH MG: at 20:48

## 2021-02-18 RX ADMIN — Medication SCH ML: at 17:00

## 2021-02-18 RX ADMIN — QUETIAPINE SCH MG: 25 TABLET ORAL at 09:56

## 2021-02-18 RX ADMIN — DOCUSATE SODIUM SCH: 100 CAPSULE, LIQUID FILLED ORAL at 09:00

## 2021-02-18 RX ADMIN — FLUCONAZOLE SCH: 100 TABLET ORAL at 09:00

## 2021-02-18 RX ADMIN — TAMSULOSIN HYDROCHLORIDE SCH MG: 0.4 CAPSULE ORAL at 20:48

## 2021-02-18 RX ADMIN — ZINC SULFATE CAP 220 MG (50 MG ELEMENTAL ZN) SCH: 220 (50 ZN) CAP at 09:00

## 2021-02-18 RX ADMIN — QUETIAPINE SCH MG: 25 TABLET ORAL at 20:48

## 2021-02-18 NOTE — P.PN
Subjective


Date of Service: 02/18/21


Primary Care Provider: Dr. Rodrigues


Chief Complaint: Respiratory failure from corona virus


Subjective: Other (Nurses report some rectal bleeding but hemoglobin stable.  

Less agitation noted today.  Patient on BiPAP 60% FiO2.  Patient remains on IV 

fluids.)





Physical Examination





- Vital Signs


Temperature: 96.5 F


Blood Pressure: 151/73


Pulse: 75


Respirations: 24


Pulse Ox (%): 93





- Studies


Medications List Reviewed: Yes





Assessment & Plan


Discharge Plan: Home


Plan to discharge in: Greater than 2 days


Physician Review Additional Text: 





Initial chief complaint:


Shortness of breath secondary to COVID 19





Physical Exam: 


Patient is alert. Dementia noted. Less agitation noted today.  Patient afebrile.


Heart:  Normal rhythm


Lung:  Patient currently on BiPAP at 60% FiO2.  No significant distress noted..


GI: soft, nontender, nondistended


EXT: good range of motion. 





Impression:


Acute respiratory failure with hypoxia secondary to bilateral COVID 19 pneumonia


Alzheimer dementia


HTN


DM Type 2 


BPH


Hyperlipidemia


Acute on chronic renal disease stage III


Rectal bleeding suspect hemorrhoid related


Mild malnutrition


Possible new sacral decubitus ulcer





Plan: 


Acute respiratory failure with hypoxia secondary to bilateral COVID 19 pneumon

ia:  Patient remains on BiPAP at 60% Fi02.  No significant agitation noted 

Overnite.  Will limit medication for agitation.  Rectal bleeding noted by nurse.

 Will monitor this closely.  Hemoglobin stable.  Recheck CBC at noontime.  Will 

consider decreasing Lovenox.  Will discuss with pulmonology.  Nurses also report

possible new sacral decubitus ulcer.  This may be related to his mild m

alnutrition.  Will consult dietary to address daily needs.  Will consult wound 

care to address ulcer.  Will check pro calcitonin.  Continue monitor closely.  

Spoke with daughter today.  They do not want the patient to go to a skilled 

facility.  Will continue to work with physical therapy if the patient is less 

agitated.  This has been difficult due to his dementia.  Will continue to 

monitor closely.  Will discuss with pulmonology in detail.  Will also discuss 

with nephrology. 


Alzheimer dementia:  Overall stable.  Continue with mood stabilizers.


HTN:  Continue medication.  parameters in place.


DM Type 2:  Continue Accu-Cheks.  Continue to adjust medication.  Maintain 

adequate control of blood sugar.


BPH:  Continue medication


Hyperlipidemia: continue medication


Acute on chronic renal disease stage III:  Continue IV fluids.  Will discuss [facility] nephrology.


Rectal bleeding suspect hemorrhoid related:  Will monitor this closely.  Recheck

CBC later today.  Consider decreasing Lovenox.  Will discuss with pulmonology.


Mild malnutrition:  Will have dietary address daily needs.  Patient on IV 

fluids.


Possible new sacral decubitus ulcer:  Will have wound care address and evaluate 

and treat.


Time Spent Managing Pts Care (In Minutes): 55

## 2021-02-18 NOTE — RAD REPORT
EXAM DESCRIPTION:  RAD - Abdomen 1 View (KUB) - 2/18/2021 10:16 am

 

CLINICAL HISTORY:  blood in stool

 

COMPARISON:  Chest Single View dated 2/14/2021

 

FINDINGS:  The gas pattern is nonspecific. Patient has substantial degradation the imaging due to res
piratory movement. No dilated bowel loops, free air or pneumatosis suspected. Moderate stool volume d
istends the rectum. No suspicious calcifications.

 

 Left lung parenchymal opacification is suspected but not adequately visualized on this motion degrad
ed study.

 

 No significant bone finding.

 

 

IMPRESSION:  Motion degraded study shows no obstruction, free air or pneumatosis.

## 2021-02-19 LAB
BUN BLD-MCNC: 55 MG/DL (ref 7–18)
CRP SERPL-MCNC: 8.96 MG/L (ref ?–3)
FERRITIN SERPL-MCNC: 313.8 NG/ML (ref 26–388)
GLUCOSE SERPLBLD-MCNC: 156 MG/DL (ref 74–106)
HCT VFR BLD CALC: 30.4 % (ref 39.6–49)
LYMPHOCYTES # SPEC AUTO: 0.4 K/UL (ref 0.7–4.9)
MAGNESIUM SERPL-MCNC: 2.5 MG/DL (ref 1.8–2.4)
PMV BLD: 9.6 FL (ref 7.6–11.3)
POTASSIUM SERPL-SCNC: 4.4 MMOL/L (ref 3.5–5.1)
RBC # BLD: 3.33 M/UL (ref 4.33–5.43)

## 2021-02-19 RX ADMIN — Medication SCH: at 14:00

## 2021-02-19 RX ADMIN — Medication SCH APPL: at 10:32

## 2021-02-19 RX ADMIN — METHYLPREDNISOLONE SODIUM SUCCINATE SCH MG: 40 INJECTION, POWDER, FOR SOLUTION INTRAMUSCULAR; INTRAVENOUS at 21:51

## 2021-02-19 RX ADMIN — FOLIC ACID SCH: 1 TABLET ORAL at 09:00

## 2021-02-19 RX ADMIN — Medication SCH: at 12:00

## 2021-02-19 RX ADMIN — FAMOTIDINE SCH MG: 20 TABLET, FILM COATED ORAL at 21:50

## 2021-02-19 RX ADMIN — FLUCONAZOLE SCH: 100 TABLET ORAL at 09:00

## 2021-02-19 RX ADMIN — DEXTROSE MONOHYDRATE SCH MLS: 50 INJECTION, SOLUTION INTRAVENOUS at 13:00

## 2021-02-19 RX ADMIN — Medication SCH: at 09:00

## 2021-02-19 RX ADMIN — Medication SCH: at 17:00

## 2021-02-19 RX ADMIN — TAMSULOSIN HYDROCHLORIDE SCH MG: 0.4 CAPSULE ORAL at 21:51

## 2021-02-19 RX ADMIN — ATORVASTATIN CALCIUM SCH MG: 10 TABLET, FILM COATED ORAL at 21:50

## 2021-02-19 RX ADMIN — Medication SCH MG: at 21:50

## 2021-02-19 RX ADMIN — Medication SCH: at 21:00

## 2021-02-19 RX ADMIN — CALCITRIOL CAPSULES 0.25 MCG SCH: 0.25 CAPSULE ORAL at 09:00

## 2021-02-19 RX ADMIN — CHOLECALCIFEROL TAB 25 MCG (1000 UNIT) SCH: 25 TAB at 09:00

## 2021-02-19 RX ADMIN — AMLODIPINE BESYLATE SCH: 10 TABLET ORAL at 09:00

## 2021-02-19 RX ADMIN — Medication SCH: at 08:00

## 2021-02-19 RX ADMIN — HUMAN INSULIN SCH: 100 INJECTION, SUSPENSION SUBCUTANEOUS at 16:30

## 2021-02-19 RX ADMIN — ZINC SULFATE CAP 220 MG (50 MG ELEMENTAL ZN) SCH: 220 (50 ZN) CAP at 09:00

## 2021-02-19 RX ADMIN — METHYLPREDNISOLONE SODIUM SUCCINATE SCH: 125 INJECTION, POWDER, FOR SOLUTION INTRAMUSCULAR; INTRAVENOUS at 09:00

## 2021-02-19 RX ADMIN — HUMAN INSULIN SCH: 100 INJECTION, SUSPENSION SUBCUTANEOUS at 07:30

## 2021-02-19 RX ADMIN — QUETIAPINE SCH MG: 25 TABLET ORAL at 21:50

## 2021-02-19 RX ADMIN — FAMOTIDINE SCH: 20 TABLET, FILM COATED ORAL at 09:00

## 2021-02-19 RX ADMIN — QUETIAPINE SCH: 25 TABLET ORAL at 09:00

## 2021-02-19 RX ADMIN — Medication SCH PATCH: at 10:32

## 2021-02-19 RX ADMIN — ENOXAPARIN SODIUM SCH: 40 INJECTION SUBCUTANEOUS at 09:00

## 2021-02-19 RX ADMIN — ACETAMINOPHEN PRN MG: 500 TABLET, FILM COATED ORAL at 21:56

## 2021-02-19 RX ADMIN — ENOXAPARIN SODIUM SCH MG: 40 INJECTION SUBCUTANEOUS at 11:18

## 2021-02-19 RX ADMIN — SODIUM CHLORIDE SCH MLS: 0.9 INJECTION, SOLUTION INTRAVENOUS at 03:00

## 2021-02-19 RX ADMIN — DOCUSATE SODIUM SCH: 100 CAPSULE, LIQUID FILLED ORAL at 09:00

## 2021-02-19 NOTE — P.PN
Subjective


Date of Service: 02/19/21


Primary Care Provider: Dr. Rodrigues


Chief Complaint: Respiratory failure from corona virus


Patient is noncooperative a becomes very agitated saturation satisfactory at 

rest on BiPAP or once he is awake and becomes agitated the decline is refusing 

to eat and drink





Review of Systems


is unable to be obtained





Physical Examination





- Vital Signs


Temperature: 98.3 F


Blood Pressure: 154/92


Pulse: 86


Respirations: 33


Pulse Ox (%): 88





- Physical Exam


General: Unresponsive


Respiratory: Clear to auscultation bilaterally, Diminished





- Studies


Medications List Reviewed: Yes





Assessment & Plan





- Problems (Diagnosis)


(1) Acute respiratory failure due to severe acute respiratory syndrome 

coronavirus 2 (SARS-CoV-2) infection


Current Visit: Yes   Status: Acute   


Plan: 


Respiratory failure patient is noncooperative is not eating and drinking much 

renal function is improving patient is getting progressively more dehydrated was

hypernatremia for D5 water blood sugars are reasonably control no evidence of GI

bleeding increase anticoagulation prognosis very poor

## 2021-02-19 NOTE — P.PN
Subjective


Date of Service: 02/19/21


Primary Care Provider: Dr. Rodrigues


Chief Complaint: Respiratory failure from corona virus


Subjective: Other (Patient remains on BiPAP.  Poor oral intake noted)





Physical Examination





- Vital Signs


Temperature: 98.3 F


Blood Pressure: 154/92


Pulse: 86


Respirations: 33


Pulse Ox (%): 88





- Studies


Medications List Reviewed: Yes





Assessment & Plan


Discharge Plan: Home


Plan to discharge in: Greater than 2 days


Physician Review Additional Text: 





Initial chief complaint:


Shortness of breath secondary to COVID 19





Physical Exam: 


Patient is alert. Dementia noted. Less agitation noted today.  Patient afebrile.


Heart:  Normal rhythm


Lung:  Patient currently on BiPAP at 60% FiO2.  No significant distress noted..


GI: soft, nontender, nondistended


EXT: good range of motion. 





Impression:


Acute respiratory failure with hypoxia secondary to bilateral COVID 19 pneumonia


Alzheimer dementia


HTN


DM Type 2 


BPH


Hyperlipidemia


Acute on chronic renal disease stage III


Rectal bleeding suspect hemorrhoid related


Mild malnutrition


Possible new sacral decubitus ulcer





Plan: 


Acute respiratory failure with hypoxia secondary to bilateral COVID 19 

pneumonia:  Patient remains on BiPAP at 60% Fi02.  Poor oral intake noted 

increase intake.  Dietary recommending Dobhoff but this has been tried in the 

past without success as the patient was pulling on it.  Pulmonology has adjusted

IV fluids due to hypernatremia.  Continue with current treatment for pneumonia. 

Continue to wean off BiPAP.  Wound care to continue therapy.  Will monitor 

closely.


Alzheimer dementia:  Overall stable.  Continue with mood stabilizers.


HTN:  Continue medication.  parameters in place.


DM Type 2:  Continue Accu-Cheks.  IV fluids adjusted.  Will need to monitor for 

hyperglycemia.  Continue to adjust medication.  Maintain adequate control of 

blood sugar.


BPH:  Continue medication


Hyperlipidemia: continue medication


Acute on chronic renal disease stage III with hypernatremia:  IV fluids 

adjusted.  Will discuss with nephrology.


Rectal bleeding suspect hemorrhoid related:  No rectal bleeding noted. Case 

discussed with pulmonology.  Daughter recommends to increase dose of Lovenox due

to risk factors.  Will monitor for any bleeding.


Mild malnutrition:  Will have dietary address daily needs.  Pulmonology adjusted

medication-fluids.


Possible new sacral decubitus ulcer:  Will have wound care address and evaluate 

and treat.


Time Spent Managing Pts Care (In Minutes): 55

## 2021-02-20 LAB
BUN BLD-MCNC: 51 MG/DL (ref 7–18)
CRP SERPL-MCNC: 10.7 MG/L (ref ?–3)
FERRITIN SERPL-MCNC: 296 NG/ML (ref 26–388)
GLUCOSE SERPLBLD-MCNC: 219 MG/DL (ref 74–106)
HCT VFR BLD CALC: 31 % (ref 39.6–49)
LYMPHOCYTES # SPEC AUTO: 0.3 K/UL (ref 0.7–4.9)
MAGNESIUM SERPL-MCNC: 2.6 MG/DL (ref 1.8–2.4)
PMV BLD: 9.7 FL (ref 7.6–11.3)
POTASSIUM SERPL-SCNC: 4.5 MMOL/L (ref 3.5–5.1)
RBC # BLD: 3.38 M/UL (ref 4.33–5.43)

## 2021-02-20 RX ADMIN — FAMOTIDINE SCH: 20 TABLET, FILM COATED ORAL at 09:00

## 2021-02-20 RX ADMIN — Medication SCH: at 09:00

## 2021-02-20 RX ADMIN — QUETIAPINE SCH MG: 25 TABLET ORAL at 20:26

## 2021-02-20 RX ADMIN — AMLODIPINE BESYLATE SCH MG: 10 TABLET ORAL at 12:31

## 2021-02-20 RX ADMIN — DEXTROSE MONOHYDRATE SCH MLS: 50 INJECTION, SOLUTION INTRAVENOUS at 12:56

## 2021-02-20 RX ADMIN — METHYLPREDNISOLONE SODIUM SUCCINATE SCH: 40 INJECTION, POWDER, FOR SOLUTION INTRAMUSCULAR; INTRAVENOUS at 09:00

## 2021-02-20 RX ADMIN — CALCITRIOL CAPSULES 0.25 MCG SCH: 0.25 CAPSULE ORAL at 09:00

## 2021-02-20 RX ADMIN — FAMOTIDINE SCH MG: 20 TABLET, FILM COATED ORAL at 20:26

## 2021-02-20 RX ADMIN — Medication SCH MG: at 20:28

## 2021-02-20 RX ADMIN — ENOXAPARIN SODIUM SCH: 40 INJECTION SUBCUTANEOUS at 09:00

## 2021-02-20 RX ADMIN — Medication SCH ML: at 17:18

## 2021-02-20 RX ADMIN — Medication SCH MG: at 20:29

## 2021-02-20 RX ADMIN — TAMSULOSIN HYDROCHLORIDE SCH MG: 0.4 CAPSULE ORAL at 20:25

## 2021-02-20 RX ADMIN — HUMAN INSULIN SCH UNITS: 100 INJECTION, SUSPENSION SUBCUTANEOUS at 17:32

## 2021-02-20 RX ADMIN — LACTULOSE PRN GM: 20 SOLUTION ORAL at 20:25

## 2021-02-20 RX ADMIN — FOLIC ACID SCH: 1 TABLET ORAL at 09:00

## 2021-02-20 RX ADMIN — CHOLECALCIFEROL TAB 25 MCG (1000 UNIT) SCH: 25 TAB at 09:00

## 2021-02-20 RX ADMIN — AMLODIPINE BESYLATE SCH: 10 TABLET ORAL at 09:00

## 2021-02-20 RX ADMIN — Medication SCH PATCH: at 12:56

## 2021-02-20 RX ADMIN — FLUCONAZOLE SCH: 100 TABLET ORAL at 09:00

## 2021-02-20 RX ADMIN — HUMAN INSULIN SCH: 100 INJECTION, SUSPENSION SUBCUTANEOUS at 07:30

## 2021-02-20 RX ADMIN — ZINC SULFATE CAP 220 MG (50 MG ELEMENTAL ZN) SCH: 220 (50 ZN) CAP at 09:00

## 2021-02-20 RX ADMIN — QUETIAPINE SCH: 25 TABLET ORAL at 09:00

## 2021-02-20 RX ADMIN — Medication SCH ML: at 12:32

## 2021-02-20 RX ADMIN — DEXTROSE MONOHYDRATE SCH: 50 INJECTION, SOLUTION INTRAVENOUS at 09:00

## 2021-02-20 RX ADMIN — Medication SCH APPL: at 11:08

## 2021-02-20 RX ADMIN — Medication SCH: at 08:00

## 2021-02-20 RX ADMIN — ATORVASTATIN CALCIUM SCH MG: 10 TABLET, FILM COATED ORAL at 20:28

## 2021-02-20 RX ADMIN — METHYLPREDNISOLONE SODIUM SUCCINATE SCH: 40 INJECTION, POWDER, FOR SOLUTION INTRAMUSCULAR; INTRAVENOUS at 20:30

## 2021-02-20 RX ADMIN — Medication SCH MG: at 12:32

## 2021-02-20 RX ADMIN — DOCUSATE SODIUM SCH: 100 CAPSULE, LIQUID FILLED ORAL at 09:00

## 2021-02-20 NOTE — P.PN
Date of Service: 02/20/21





Progress Note Dictated.


Reviewed with RNSofy. increase d5w to 75ccs/hour for 1 litre then go back to 

50ccs/hour.


still requiring high oxygen supplementation/critical with covid 19 and 

respiratory failure.

## 2021-02-20 NOTE — P.PN
Subjective


Date of Service: 02/20/21


Primary Care Provider: Dr. Rodrigues


Chief Complaint: Respiratory failure from corona virus


Subjective: No new changes





Physical Examination





- Vital Signs


Temperature: 97.7 F


Blood Pressure: 184/99


Pulse: 89


Respirations: 20


Pulse Ox (%): 94





- Studies


Medications List Reviewed: Yes





Assessment & Plan


Discharge Plan: Home


Plan to discharge in: Greater than 2 days


Physician Review Additional Text: 





Initial chief complaint:


Shortness of breath secondary to COVID 19





Physical Exam: 


Patient is alert. Dementia noted. Less agitation noted today.  Patient afebrile.


Heart:  Normal rhythm


Lung:  Patient currently on BiPAP at 65% FiO2.  No significant distress noted..


GI: soft, nontender, nondistended


EXT: good range of motion. 





Impression:


Acute respiratory failure with hypoxia secondary to bilateral COVID 19 pneumonia


Alzheimer dementia


HTN


DM Type 2 


BPH


Hyperlipidemia


Acute on chronic renal disease stage III


Rectal bleeding suspect hemorrhoid related


Mild malnutrition


Possible new sacral decubitus ulcer


Thrombocytopenia likely related to above





Plan: 


Acute respiratory failure with hypoxia secondary to bilateral COVID 19 

pneumonia:  Patient remains on BiPAP at 65% Fi02.  Continue to encourage oral 

intake.  Continue D5W.  Continue to monitor hypernatremia.  Respiratory to wean 

off BiPAP.  Platelet count low.  Hold Lovenox for today.  Recheck LDH, platelet 

count tomorrow.  Haptoglobin pending.  Peripheral smear reviewed.  No 

schistocyte noted. Continue current plan of care.


Alzheimer dementia:  Overall stable.  Continue with mood stabilizers.


HTN:  Continue medication.  parameters in place.


DM Type 2:  Continue Accu-Cheks.  IV fluids adjusted.  Will need to monitor for 

hyperglycemia.  Continue to adjust medication.  Maintain adequate control of 

blood sugar.


BPH:  Continue medication


Hyperlipidemia: continue medication


Acute on chronic renal disease stage III with hypernatremia:  Patient remains on

D5W.  Will monitor closely.


Rectal bleeding suspect hemorrhoid related:  No rectal bleeding noted. Case 

discussed with pulmonology.  Hold Lovenox if platelet count less than 100. Will 

monitor for any bleeding.


Mild malnutrition:  Will have dietary address daily needs.  Pulmonology adjusted

medication-fluids.


Possible new sacral decubitus ulcer:  Will have wound care address and evaluate 

and treat.


Thrombocytopenia likely related to above:  Will monitor closely.  Recheck lab 

tomorrow.  Whole Lovenox if platelet count less than 100.


Time Spent Managing Pts Care (In Minutes): 55

## 2021-02-20 NOTE — PN
Subjective:  The patient is alert, awake, but not able to answer questions appropriately due to his q
uestion baseline dementia, question due to difficulty with breathing.  The patient has an oxygen on w
ith mask non-rebreather currently, still having some difficulty with breathing and as he speaks, he g
ets short of breath, unable to complete sentences.



Objective:  Vital Signs:  His vitals look stable.  Blood pressure is 132/62, pulse is 90 to 95 and re
gular, respirations are around 18 to 20, and afebrile, O2 sats 100%, but he is currently on a non-carmencita
reather, getting oxygen with face mask.  He does have otherwise stable vitals except for his breathin
g situation. 

Lungs:  Coarse breath sounds with decreased breath sounds at the bases.  He does not have good air mo
vement. 

Abdomen:  Soft. 

Extremities:  No edema. 

Heart:  Sounds are regular.



Medications:  Reviewed.



Laboratory Data:  Reviewed.  Labs show WBC count of 2.9, hemoglobin 10.2, hematocrit 31, platelet cou
nt of 97.  His sodium has continued to come up with sodium today at 147, potassium 4.5, chloride 112,
 bicarb is 31, BUN is 51, creatinine is 1.53.



Assessment And Plan:  The patient with history of COVID-19 respiratory bronchitis with question pneum
onia, currently being followed by intensivist Pulmonary/Critical Care.  Dr. Bal appreciate their
 input and assistance.  The patient is requiring high level of oxygen.  His volume status seems to be
 close to euvolemic.  If anything, he is slightly volume depleted, especially free water depleted.  MELISSA byers has not been eating and drinking much due to his high oxygen requirement.  At this point, his sodiu
m has creeped up to 147.  He is currently on 50 cc of D5W, that has been started by Dr. Bal.  Ag
ree with this D5W replenishing free water, we will go ahead and increase it to 75 cc/hour for 1 L and
 then revert back to 50 cc/hour of D5W and assess with BMP to see if further free water repletion adj
ustment is needed in the near future.  I will continue to monitor closely.  The patient's condition c
ontinues to be critical with significant respiratory failure, most likely secondary to COVID-19 as hi
s volume status seems not to be overloaded at this point.  His acute kidney injury could be from the 
COVID-19 infection, also due to some volume depletion. 



Appreciate your consultation.  Do not hesitate to call me if any questions.





MD/SE

DD:  02/20/2021 14:40:35Voice ID:  [id number]

DT:  02/20/2021 19:20:31Report ID:  [id number]

## 2021-02-21 LAB
ANISOCYTOSIS BLD QL: (no result)
BLD SMEAR INTERP: (no result)
BUN BLD-MCNC: 41 MG/DL (ref 7–18)
CRP SERPL-MCNC: 19 MG/L (ref ?–3)
FERRITIN SERPL-MCNC: 258.1 NG/ML (ref 26–388)
GLUCOSE SERPLBLD-MCNC: 215 MG/DL (ref 74–106)
HCT VFR BLD CALC: 29.2 % (ref 39.6–49)
HYPOCHROMIA BLD QL: (no result)
INR BLD: 0.99
LYMPHOCYTES # SPEC AUTO: 0.3 K/UL (ref 0.7–4.9)
MAGNESIUM SERPL-MCNC: 2.5 MG/DL (ref 1.8–2.4)
MORPHOLOGY BLD-IMP: (no result)
OVALOCYTES BLD QL SMEAR: (no result)
PMV BLD: 9.9 FL (ref 7.6–11.3)
POTASSIUM SERPL-SCNC: 4 MMOL/L (ref 3.5–5.1)
RBC # BLD: 3.18 M/UL (ref 4.33–5.43)

## 2021-02-21 RX ADMIN — TAMSULOSIN HYDROCHLORIDE SCH MG: 0.4 CAPSULE ORAL at 20:56

## 2021-02-21 RX ADMIN — Medication SCH MG: at 14:48

## 2021-02-21 RX ADMIN — Medication SCH MG: at 21:07

## 2021-02-21 RX ADMIN — Medication SCH ML: at 17:00

## 2021-02-21 RX ADMIN — Medication SCH ML: at 12:00

## 2021-02-21 RX ADMIN — CALCITRIOL CAPSULES 0.25 MCG SCH MCG: 0.25 CAPSULE ORAL at 10:59

## 2021-02-21 RX ADMIN — FAMOTIDINE SCH MG: 20 TABLET, FILM COATED ORAL at 10:08

## 2021-02-21 RX ADMIN — METHYLPREDNISOLONE SODIUM SUCCINATE SCH MG: 40 INJECTION, POWDER, FOR SOLUTION INTRAMUSCULAR; INTRAVENOUS at 20:55

## 2021-02-21 RX ADMIN — FOLIC ACID SCH MG: 1 TABLET ORAL at 10:11

## 2021-02-21 RX ADMIN — ZINC SULFATE CAP 220 MG (50 MG ELEMENTAL ZN) SCH MG: 220 (50 ZN) CAP at 10:10

## 2021-02-21 RX ADMIN — AMLODIPINE BESYLATE SCH MG: 10 TABLET ORAL at 10:11

## 2021-02-21 RX ADMIN — CHOLECALCIFEROL TAB 25 MCG (1000 UNIT) SCH UNIT: 25 TAB at 10:11

## 2021-02-21 RX ADMIN — HUMAN INSULIN SCH UNITS: 100 INJECTION, SUSPENSION SUBCUTANEOUS at 10:58

## 2021-02-21 RX ADMIN — HUMAN INSULIN SCH UNITS: 100 INJECTION, SUSPENSION SUBCUTANEOUS at 17:58

## 2021-02-21 RX ADMIN — ENOXAPARIN SODIUM SCH: 40 INJECTION SUBCUTANEOUS at 09:00

## 2021-02-21 RX ADMIN — FAMOTIDINE SCH MG: 20 TABLET, FILM COATED ORAL at 20:55

## 2021-02-21 RX ADMIN — Medication SCH MG: at 21:00

## 2021-02-21 RX ADMIN — Medication SCH MG: at 10:10

## 2021-02-21 RX ADMIN — METHYLPREDNISOLONE SODIUM SUCCINATE SCH MG: 40 INJECTION, POWDER, FOR SOLUTION INTRAMUSCULAR; INTRAVENOUS at 10:58

## 2021-02-21 RX ADMIN — ATORVASTATIN CALCIUM SCH MG: 10 TABLET, FILM COATED ORAL at 20:55

## 2021-02-21 RX ADMIN — Medication SCH ML: at 08:00

## 2021-02-21 RX ADMIN — Medication SCH APPL: at 09:00

## 2021-02-21 RX ADMIN — DOCUSATE SODIUM SCH MG: 100 CAPSULE, LIQUID FILLED ORAL at 10:11

## 2021-02-21 RX ADMIN — FLUCONAZOLE SCH MG: 100 TABLET ORAL at 10:10

## 2021-02-21 RX ADMIN — Medication SCH PATCH: at 10:59

## 2021-02-21 RX ADMIN — Medication SCH: at 09:00

## 2021-02-21 NOTE — PN
Location:  Patient was seen in 4th floor at [facility].



History Of Present Illness:  Patient is an 87-year-old male.  Patient is looking improved, compared t
o yesterday.  He is still on non-rebreather, but seems to be breathing better.  He is able to respond
 better states that he is feeling well.  Nods his head and seems to be more interactive, compared to 
yesterday.



Physical Examination:

Vital Signs:  Reviewed.  Blood pressure now is 146/73, pulse around 90-95 and regular, respirations a
round 16-18.  His O2 saturations are 97%.  Still using non-rebreather. 

Lungs:  With coarse sounds, but better air entry, compared to yesterday. 

Abdomen:  Soft. 

Extremities:  Reveal no edema. 

Cardiovascular:  Heart sounds are regular.



Assessment And Plan:  Acute respiratory failure with COVID-19 pneumonia.  Patient also with hypertens
ion.  Patient with acute renal failure with chronic kidney disease history.  At this point, patient i
s improving.  His sodium has improved slightly compared to yesterday.  He does not seem to be volume 
overloaded despite getting some D5W yesterday, which was for hydration.  Continue patient with D5W at
 50 cc an hour, but for now, increase it to about 75 cc an hour for 1 L and then revert back to 50 cc
 an hour.  Recheck BMP tomorrow.  Patient clinically seems to be improved.  Continue to monitor close
ly.  

Condition still guarded with high requirement for oxygen.  Continue treatment with steroids.





MD/SE

DD:  02/21/2021 13:59:35Voice ID:  [id number]

DT:  02/21/2021 18:19:29Report ID:  [id number]

## 2021-02-21 NOTE — P.PN
Subjective


Date of Service: 02/21/21


Primary Care Provider: Dr. Rodrigues


Chief Complaint: Respiratory failure from corona virus


Subjective: Other (Patient appears improved.  More alert.  Patient on non-

rebreather.)





Physical Examination





- Vital Signs


Temperature: 97 F


Blood Pressure: 170/79


Pulse: 96


Respirations: 26


Pulse Ox (%): 98





- Studies


Medications List Reviewed: Yes





Assessment & Plan


Discharge Plan: Home


Plan to discharge in: Greater than 2 days


Physician Review Additional Text: 





Initial chief complaint:


87-year-old  male presented with acute respiratory failure with 

hypoxia secondary to COVID 19





Physical Exam: 


Patient appears more alert.   Patient denies any pain. Dementia noted. Less 

agitation noted today.  Patient afebrile.  Vital signs stable. 


Heart:  Normal rhythm


Lung:  Patient wean down to non-rebreather.  Was on BiPAP yesterday.  No 

significant distress noted..


GI: soft, nontender, nondistended


EXT: good range of motion. 





Impression:


Acute respiratory failure with hypoxia secondary to bilateral COVID 19 pneumonia


Alzheimer dementia


HTN


DM Type 2 


BPH


Hyperlipidemia


Acute on chronic renal disease stage III


Rectal bleeding suspect hemorrhoid related


Mild malnutrition


Possible new sacral decubitus ulcer


Thrombocytopenia likely related to above





Plan: 


Acute respiratory failure with hypoxia secondary to bilateral COVID 19 

pneumonia:  Patient on BiPAP yesterday.  Now on non-rebreather.  Oxygen 

saturations stable.  Platelet count low.  Continue to hold Lovenox.  Will 

monitor platelet count.  Continue IV Solu-Medrol.  Patient on D5W due to 

hypernatremia.  This has resolved.  Will discuss with nephrology and pulmonology

about adjustment in medication.  Spoke with family earlier in the week.  They 

did not desire skilled placement for the patient.  Patient's progress has been 

poor.  Continue with current plan of care.  Will monitor closely.  I will turn 

the service over to the hospitalist team tomorrow.


Alzheimer dementia:  Overall stable.  Continue with mood stabilizers.


HTN:  Continue medication.  parameters in place.


DM Type 2:  Continue Accu-Cheks.  IV fluids adjusted by Nephrology and 

pulmonology.  Will need to monitor for hyperglycemia.  Continue to adjust 

medication.  Maintain adequate control of blood sugar.


BPH:  Continue medication


Hyperlipidemia: continue medication


Acute on chronic renal disease stage III with hypernatremia:  Nephrology 

continues to adjust medication..


Rectal bleeding suspect hemorrhoid related:  No rectal bleeding noted. Case 

discussed with pulmonology.  Hold Lovenox if platelet count less than 100. Will 

monitor for any bleeding.


Mild malnutrition:  Continue to have dietary address daily needs.  Encourage 

oral intake.  


Possible new sacral decubitus ulcer:  Will have wound care address and evaluate 

and treat.


Thrombocytopenia likely related to above:  Platelet count low.  Continue to hold

Lovenox.  LDH improved.   INR/PTT unchanged. Peripheral smear showed no 

schistocytes.  Suspect thrombocytopenia related to above.  Will monitor closely.

 Will monitor closely.  Will discuss with pulmonology.  


Time Spent Managing Pts Care (In Minutes): 55

## 2021-02-22 LAB
ALBUMIN SERPL BCP-MCNC: 2 G/DL (ref 3.4–5)
ALP SERPL-CCNC: 68 U/L (ref 45–117)
ALT SERPL W P-5'-P-CCNC: 202 U/L (ref 12–78)
AST SERPL W P-5'-P-CCNC: 37 U/L (ref 15–37)
BUN BLD-MCNC: 35 MG/DL (ref 7–18)
CRP SERPL-MCNC: 13.6 MG/L (ref ?–3)
FERRITIN SERPL-MCNC: 268.9 NG/ML (ref 26–388)
GLUCOSE SERPLBLD-MCNC: 216 MG/DL (ref 74–106)
HCT VFR BLD CALC: 26.7 % (ref 39.6–49)
INR BLD: 1.04
LYMPHOCYTES # SPEC AUTO: 0.4 K/UL (ref 0.7–4.9)
MAGNESIUM SERPL-MCNC: 2.4 MG/DL (ref 1.8–2.4)
PMV BLD: 9.3 FL (ref 7.6–11.3)
POTASSIUM SERPL-SCNC: 4.4 MMOL/L (ref 3.5–5.1)
RBC # BLD: 2.93 M/UL (ref 4.33–5.43)

## 2021-02-22 RX ADMIN — AMLODIPINE BESYLATE SCH MG: 10 TABLET ORAL at 09:25

## 2021-02-22 RX ADMIN — Medication SCH APPL: at 09:00

## 2021-02-22 RX ADMIN — Medication SCH MG: at 09:25

## 2021-02-22 RX ADMIN — Medication SCH MG: at 12:51

## 2021-02-22 RX ADMIN — Medication SCH ML: at 08:00

## 2021-02-22 RX ADMIN — Medication SCH: at 21:00

## 2021-02-22 RX ADMIN — DEXTROSE MONOHYDRATE SCH: 50 INJECTION, SOLUTION INTRAVENOUS at 14:00

## 2021-02-22 RX ADMIN — ENOXAPARIN SODIUM SCH MG: 40 INJECTION SUBCUTANEOUS at 09:26

## 2021-02-22 RX ADMIN — Medication SCH MG: at 12:43

## 2021-02-22 RX ADMIN — ZINC SULFATE CAP 220 MG (50 MG ELEMENTAL ZN) SCH MG: 220 (50 ZN) CAP at 09:32

## 2021-02-22 RX ADMIN — Medication SCH ML: at 16:35

## 2021-02-22 RX ADMIN — HUMAN INSULIN SCH UNITS: 100 INJECTION, SUSPENSION SUBCUTANEOUS at 16:34

## 2021-02-22 RX ADMIN — Medication SCH MG: at 22:53

## 2021-02-22 RX ADMIN — CHOLECALCIFEROL TAB 25 MCG (1000 UNIT) SCH UNIT: 25 TAB at 09:26

## 2021-02-22 RX ADMIN — FLUCONAZOLE SCH MG: 100 TABLET ORAL at 09:25

## 2021-02-22 RX ADMIN — FOLIC ACID SCH MG: 1 TABLET ORAL at 09:53

## 2021-02-22 RX ADMIN — Medication SCH ML: at 12:00

## 2021-02-22 RX ADMIN — METHYLPREDNISOLONE SODIUM SUCCINATE SCH MG: 40 INJECTION, POWDER, FOR SOLUTION INTRAMUSCULAR; INTRAVENOUS at 22:52

## 2021-02-22 RX ADMIN — FAMOTIDINE SCH MG: 20 TABLET, FILM COATED ORAL at 22:51

## 2021-02-22 RX ADMIN — METHYLPREDNISOLONE SODIUM SUCCINATE SCH MG: 40 INJECTION, POWDER, FOR SOLUTION INTRAMUSCULAR; INTRAVENOUS at 09:27

## 2021-02-22 RX ADMIN — Medication ONE MG: at 22:50

## 2021-02-22 RX ADMIN — CALCITRIOL CAPSULES 0.25 MCG SCH MCG: 0.25 CAPSULE ORAL at 09:26

## 2021-02-22 RX ADMIN — ATORVASTATIN CALCIUM SCH MG: 10 TABLET, FILM COATED ORAL at 22:51

## 2021-02-22 RX ADMIN — Medication ONE MG: at 22:53

## 2021-02-22 RX ADMIN — FAMOTIDINE SCH MG: 20 TABLET, FILM COATED ORAL at 09:26

## 2021-02-22 RX ADMIN — DEXTROSE MONOHYDRATE SCH MLS: 50 INJECTION, SOLUTION INTRAVENOUS at 18:44

## 2021-02-22 RX ADMIN — ENOXAPARIN SODIUM SCH: 40 INJECTION SUBCUTANEOUS at 09:00

## 2021-02-22 RX ADMIN — DOCUSATE SODIUM SCH MG: 100 CAPSULE, LIQUID FILLED ORAL at 09:26

## 2021-02-22 RX ADMIN — HUMAN INSULIN SCH UNITS: 100 INJECTION, SUSPENSION SUBCUTANEOUS at 09:26

## 2021-02-22 RX ADMIN — TAMSULOSIN HYDROCHLORIDE SCH MG: 0.4 CAPSULE ORAL at 22:51

## 2021-02-22 RX ADMIN — HYDROCODONE BITARTRATE AND ACETAMINOPHEN PRN TAB: 5; 325 TABLET ORAL at 09:53

## 2021-02-22 RX ADMIN — Medication SCH: at 09:00

## 2021-02-22 RX ADMIN — Medication SCH PATCH: at 09:25

## 2021-02-22 NOTE — P.PN
Date of Service: 02/22/21


Vital Signs











Temp Pulse Resp BP Pulse Ox


 


 96.5 F L  63   20   148/88 H  96 


 


 02/22/21 20:00  02/22/21 20:00  02/22/21 20:00  02/22/21 20:00  02/22/21 20:00





Medications





Hydrocodone Bitart/Acetaminophen (Hydrocodone/Apap 5/325 Mg Tab)  1 tab PO Q6H 

PRN


   PRN Reason: Pain scale 5-7 (Moderate)


   Last Admin: 02/22/21 09:53 Dose:  1 tab


   Documented by: 


Amlodipine Besylate (Amlodipine 10 Mg Tab)  10 mg PO DAILY [facility]


   Last Admin: 02/22/21 09:25 Dose:  10 mg


   Documented by: 


Ascorbic Acid (Ascorbic Acid 500 Mg Tablet)  500 mg PO TID [facility]


   Last Admin: 02/22/21 12:43 Dose:  500 mg


   Documented by: 


Atorvastatin Calcium (Atorvastatin 10 Mg Tab)  10 mg PO BEDTIME [facility]


   Last Admin: 02/21/21 20:55 Dose:  10 mg


   Documented by: 


Benzonatate (Benzonatate 100 Mg Cap)  200 mg PO Q4HP PRN


   PRN Reason: COUGH


Calcitriol (Calcitrol 0.25 Mcg Cap)  0.5 mcg PO DAILY [facility]


   Last Admin: 02/22/21 09:26 Dose:  0.5 mcg


   Documented by: 


Carvedilol (Carvedilol 12.5 Mg Tab)  12.5 mg PO DAILY [facility]


   Last Admin: 02/22/21 09:32 Dose:  12.5 mg


   Documented by: 


Cholecalciferol (Vitamin D 1000 Unit Tab)  2,000 unit PO DAILY [facility]


   Last Admin: 02/22/21 09:26 Dose:  2,000 unit


   Documented by: 


Dextrose (D50w 25 Gm/50 Ml Vial)  12.5 gm IV PRN PRN; Protocol


   PRN Reason: HYPOGLYCEMIA


Docusate Sodium (Docusate Na 100 Mg Cap)  100 mg PO DAILY [facility]


   Last Admin: 02/22/21 09:26 Dose:  100 mg


   Documented by: 


Emollient Gel (Medihoney 44 Ml Topical Tube)  1 appl TOP DAILY [facility]


   Last Admin: 02/22/21 09:00 Dose:  1 appl


   Documented by: 


Enoxaparin Sodium (Enoxaparin 40 Mg/0.4 Ml)  40 mg SQ DAILY [facility]


   Last Admin: 02/22/21 09:00 Dose:  Not Given


   Documented by: 


Enteral Nutritional Formula (Glucerna Shake 237 Ml Can)  237 ml PO TIDWM [facility]


   Last Admin: 02/22/21 16:35 Dose:  237 ml


   Documented by: 


Famotidine (Famotidine 20 Mg Tab)  20 mg PO BID [facility]; Protocol


   Last Admin: 02/22/21 09:26 Dose:  20 mg


   Documented by: 


Fluconazole (Fluconazole 100 Mg Tab)  100 mg PO DAILY [facility]; Protocol


   Last Admin: 02/22/21 09:25 Dose:  100 mg


   Documented by: 


Folic Acid (Folic Acid 1 Mg Tablet)  1 mg PO DAILY [facility]


   Last Admin: 02/22/21 09:53 Dose:  1 mg


   Documented by: 


Glucagon (Glucagon 1 Mg/Vial)  1 mg IM 1X PRN; Protocol


   PRN Reason: HYPOGLYCEMIA


Dextrose/Water (Dextrose In Water (1-Liter))  1,000 mls @ 50 mls/hr IV .Q20H [facility]


   Last Admin: 02/22/21 18:44 Dose:  1,000 mls


   Documented by: 


Insulin Human Isoph/Insulin Regular (Insulin 70/30 100 Units/Ml)  10 unit SQ 

BIDAC [facility]


   Last Admin: 02/22/21 16:34 Dose:  10 units


   Documented by: 


Lactulose (Lactulose 20 Gm/30 Ml Ucup)  10 gm PO BID PRN


   PRN Reason: CONSTIPATION


   Last Admin: 02/20/21 20:25 Dose:  10 gm


   Documented by: 


Lidocaine (Lidocaine 4% Patch)  1 patch TOP DAILY [facility]


   Last Admin: 02/22/21 09:25 Dose:  1 patch


   Documented by: 


Lorazepam (Lorazepam 2 Mg/Ml Vial)  0.25 mg IV TIDP PRN


   PRN Reason: ANXIETY


Methylprednisolone Sodium Succinate (Methylprednisolone 40 Mg Inj)  40 mg IV BID

[facility]


   Last Admin: 02/22/21 09:27 Dose:  40 mg


   Documented by: 


Sodium Chloride (Sodium Chloride 0.9% 10ml Inj)  10 ml IV UD PRN


   PRN Reason: Diluant


   Stop: 02/28/21 16:17


Sterile Water (Water For Inj,Sterile 10 Ml)  1.2 ml IM UD PRN


   PRN Reason: DILUTION OF MED


Tamsulosin HCl (Tamsulosin 0.4 Mg Sr Cap)  0.4 mg PO BEDTIME [facility]


   Last Admin: 02/21/21 20:56 Dose:  0.4 mg


   Documented by: 


Thiamine HCl (Thiamine Hcl 100 Mg Tablet)  200 mg PO BID [facility]


   Last Admin: 02/22/21 12:51 Dose:  200 mg


   Documented by: 


Zinc Sulfate (Zinc Sulfate 220 Mg Cap)  220 mg PO DAILY [facility]


   Last Admin: 02/22/21 09:32 Dose:  220 mg


   Documented by: 


Microbiology Results





01/20/21 18:00   Blood  - Blood   Aerobic Blood Culture - Final


                            No growth in 5 days.


01/20/21 18:00   Blood  - Blood   Anaerobic Blood Culture - Final


                            No growth in 5 days.


01/20/21 17:00   Blood  - Blood   Aerobic Blood Culture - Final


                            No growth in 5 days.


01/20/21 17:00   Blood  - Blood   Anaerobic Blood Culture - Final


                            No growth in 5 days.


01/20/21 18:30   Clean Catch Urine   [place] Count - Final


                            No growth.


01/20/21 18:30   Clean Catch Urine    - Final


                            No growth.





Assessment/ Plan: 


Nephrology





No acute cardiac or pulmonary complaints.  +COOPER


Weakness and fatigue


No acute events overnight.





Vitals, medications, blood work and imaging reviewed in the chart.


NAD.  MMM.  Neck supple.  CTA.  RRR.  Soft Abd.  No C/C/E.  No rash.  Somnolent.

 Normal Speech. 





A/P:  Continue the current POC and Medications other than the changes listed.  

AM Labs PRN.  Recommend daily weight.  Please see the orders for complete 

details.





YANET


CKD 3B with protienuria


-No NSAIDs





Hyponatremia





Hypocalcemia


-Continue Vitamin D


-Continue Calcitriol





HTN with CKD/ CHF


-Continue Amlodipine and Coreg





Diastolic CHF, chronic


-Continue Coreg





DM II with CKD


-Wean steroids as tolerated





Moderate malnutrition


-Encourage nutrition


-Recommend protein supplementation





Anemia in chronic illness


GI bleed


-Retacrit PRN





BPH with LUTS


-Continue Flomax





Acute hypoxic respiratory failure due to COVID-19


-Continue steroid therapy


-Wean Oxygen as tolerated.

## 2021-02-23 RX ADMIN — Medication SCH MG: at 23:00

## 2021-02-23 RX ADMIN — Medication SCH MG: at 09:00

## 2021-02-23 RX ADMIN — Medication SCH MG: at 13:13

## 2021-02-23 RX ADMIN — DOCUSATE SODIUM SCH MG: 100 CAPSULE, LIQUID FILLED ORAL at 09:48

## 2021-02-23 RX ADMIN — METHYLPREDNISOLONE SODIUM SUCCINATE SCH MG: 40 INJECTION, POWDER, FOR SOLUTION INTRAMUSCULAR; INTRAVENOUS at 09:00

## 2021-02-23 RX ADMIN — FOLIC ACID SCH MG: 1 TABLET ORAL at 09:51

## 2021-02-23 RX ADMIN — HUMAN INSULIN SCH UNITS: 100 INJECTION, SUSPENSION SUBCUTANEOUS at 09:47

## 2021-02-23 RX ADMIN — Medication SCH MG: at 22:59

## 2021-02-23 RX ADMIN — Medication SCH MG: at 09:49

## 2021-02-23 RX ADMIN — FLUCONAZOLE SCH MG: 100 TABLET ORAL at 09:00

## 2021-02-23 RX ADMIN — DEXTROSE MONOHYDRATE SCH: 50 INJECTION, SOLUTION INTRAVENOUS at 10:00

## 2021-02-23 RX ADMIN — METHYLPREDNISOLONE SODIUM SUCCINATE SCH MG: 40 INJECTION, POWDER, FOR SOLUTION INTRAMUSCULAR; INTRAVENOUS at 22:59

## 2021-02-23 RX ADMIN — Medication SCH PATCH: at 09:48

## 2021-02-23 RX ADMIN — Medication SCH ML: at 17:20

## 2021-02-23 RX ADMIN — ENOXAPARIN SODIUM SCH: 40 INJECTION SUBCUTANEOUS at 09:00

## 2021-02-23 RX ADMIN — TAMSULOSIN HYDROCHLORIDE SCH MG: 0.4 CAPSULE ORAL at 22:58

## 2021-02-23 RX ADMIN — HUMAN INSULIN SCH UNITS: 100 INJECTION, SUSPENSION SUBCUTANEOUS at 17:16

## 2021-02-23 RX ADMIN — CALCITRIOL CAPSULES 0.25 MCG SCH MCG: 0.25 CAPSULE ORAL at 09:50

## 2021-02-23 RX ADMIN — FAMOTIDINE SCH MG: 20 TABLET, FILM COATED ORAL at 09:50

## 2021-02-23 RX ADMIN — ZINC SULFATE CAP 220 MG (50 MG ELEMENTAL ZN) SCH MG: 220 (50 ZN) CAP at 09:00

## 2021-02-23 RX ADMIN — Medication SCH APPL: at 09:52

## 2021-02-23 RX ADMIN — FAMOTIDINE SCH MG: 20 TABLET, FILM COATED ORAL at 22:59

## 2021-02-23 RX ADMIN — Medication SCH ML: at 12:32

## 2021-02-23 RX ADMIN — Medication SCH ML: at 09:47

## 2021-02-23 RX ADMIN — CHOLECALCIFEROL TAB 25 MCG (1000 UNIT) SCH UNIT: 25 TAB at 09:50

## 2021-02-23 RX ADMIN — AMLODIPINE BESYLATE SCH MG: 10 TABLET ORAL at 09:49

## 2021-02-23 RX ADMIN — ATORVASTATIN CALCIUM SCH MG: 10 TABLET, FILM COATED ORAL at 22:59

## 2021-02-23 NOTE — P.PN
Date of Service: 02/23/21


Vital Signs











Temp Pulse Resp BP Pulse Ox


 


 97.4 F   79   20   133/64   91 


 


 02/23/21 16:00  02/23/21 16:00  02/23/21 16:00  02/23/21 16:00  02/23/21 16:00





Medications





Hydrocodone Bitart/Acetaminophen (Hydrocodone/Apap 5/325 Mg Tab)  1 tab PO Q6H 

PRN


   PRN Reason: Pain scale 5-7 (Moderate)


   Last Admin: 02/22/21 09:53 Dose:  1 tab


   Documented by: 


Amlodipine Besylate (Amlodipine 10 Mg Tab)  10 mg PO DAILY [facility]


   Last Admin: 02/23/21 09:49 Dose:  10 mg


   Documented by: 


Ascorbic Acid (Ascorbic Acid 500 Mg Tablet)  500 mg PO TID [facility]


   Last Admin: 02/23/21 13:13 Dose:  500 mg


   Documented by: 


Atorvastatin Calcium (Atorvastatin 10 Mg Tab)  10 mg PO BEDTIME [facility]


   Last Admin: 02/22/21 22:51 Dose:  10 mg


   Documented by: 


Benzonatate (Benzonatate 100 Mg Cap)  200 mg PO Q4HP PRN


   PRN Reason: COUGH


Calcitriol (Calcitrol 0.25 Mcg Cap)  0.5 mcg PO DAILY [facility]


   Last Admin: 02/23/21 09:50 Dose:  0.5 mcg


   Documented by: 


Carvedilol (Carvedilol 12.5 Mg Tab)  12.5 mg PO DAILY [facility]


   Last Admin: 02/23/21 09:50 Dose:  12.5 mg


   Documented by: 


Cholecalciferol (Vitamin D 1000 Unit Tab)  2,000 unit PO DAILY [facility]


   Last Admin: 02/23/21 09:50 Dose:  2,000 unit


   Documented by: 


Dextrose (D50w 25 Gm/50 Ml Vial)  12.5 gm IV PRN PRN; Protocol


   PRN Reason: HYPOGLYCEMIA


Docusate Sodium (Docusate Na 100 Mg Cap)  100 mg PO DAILY [facility]


   Last Admin: 02/23/21 09:48 Dose:  100 mg


   Documented by: 


Emollient Gel (Medihoney 44 Ml Topical Tube)  1 appl TOP DAILY [facility]


   Last Admin: 02/23/21 09:52 Dose:  1 appl


   Documented by: 


Enoxaparin Sodium (Enoxaparin 40 Mg/0.4 Ml)  40 mg SQ DAILY [facility]


   Last Admin: 02/23/21 09:00 Dose:  Not Given


   Documented by: 


Enteral Nutritional Formula (Glucerna Shake 237 Ml Can)  237 ml PO TIDWM [facility]


   Last Admin: 02/23/21 17:20 Dose:  237 ml


   Documented by: 


Famotidine (Famotidine 20 Mg Tab)  20 mg PO BID [facility]; Protocol


   Last Admin: 02/23/21 09:50 Dose:  20 mg


   Documented by: 


Fluconazole (Fluconazole 100 Mg Tab)  100 mg PO DAILY [facility]; Protocol


   Last Admin: 02/23/21 09:00 Dose:  100 mg


   Documented by: 


Folic Acid (Folic Acid 1 Mg Tablet)  1 mg PO DAILY [facility]


   Last Admin: 02/23/21 09:51 Dose:  1 mg


   Documented by: 


Glucagon (Glucagon 1 Mg/Vial)  1 mg IM 1X PRN; Protocol


   PRN Reason: HYPOGLYCEMIA


Dextrose/Water (Dextrose In Water (1-Liter))  1,000 mls @ 50 mls/hr IV .Q20H [facility]


   Last Admin: 02/23/21 10:00 Dose:  Not Given


   Documented by: 


Insulin Human Isoph/Insulin Regular (Insulin 70/30 100 Units/Ml)  10 unit SQ 

BIDAC [facility]


   Last Admin: 02/23/21 17:16 Dose:  215 units


   Documented by: 


Lactulose (Lactulose 20 Gm/30 Ml Ucup)  10 gm PO BID PRN


   PRN Reason: CONSTIPATION


   Last Admin: 02/20/21 20:25 Dose:  10 gm


   Documented by: 


Lidocaine (Lidocaine 4% Patch)  1 patch TOP DAILY [facility]


   Last Admin: 02/23/21 09:48 Dose:  1 patch


   Documented by: 


Lorazepam (Lorazepam 2 Mg/Ml Vial)  0.25 mg IV TIDP PRN


   PRN Reason: ANXIETY


Methylprednisolone Sodium Succinate (Methylprednisolone 40 Mg Inj)  40 mg IV BID

[facility]


   Last Admin: 02/23/21 09:00 Dose:  40 mg


   Documented by: 


Sodium Chloride (Sodium Chloride 0.9% 10ml Inj)  10 ml IV UD PRN


   PRN Reason: Diluant


   Stop: 02/28/21 16:17


Sterile Water (Water For Inj,Sterile 10 Ml)  1.2 ml IM UD PRN


   PRN Reason: DILUTION OF MED


Tamsulosin HCl (Tamsulosin 0.4 Mg Sr Cap)  0.4 mg PO BEDTIME [facility]


   Last Admin: 02/22/21 22:51 Dose:  0.4 mg


   Documented by: 


Thiamine HCl (Thiamine Hcl 100 Mg Tablet)  200 mg PO BID [facility]


   Last Admin: 02/23/21 09:49 Dose:  200 mg


   Documented by: 


Zinc Sulfate (Zinc Sulfate 220 Mg Cap)  220 mg PO DAILY [facility]


   Last Admin: 02/23/21 09:00 Dose:  220 mg


   Documented by: 


Microbiology Results





01/20/21 18:00   Blood  - Blood   Aerobic Blood Culture - Final


                            No growth in 5 days.


01/20/21 18:00   Blood  - Blood   Anaerobic Blood Culture - Final


                            No growth in 5 days.


01/20/21 17:00   Blood  - Blood   Aerobic Blood Culture - Final


                            No growth in 5 days.


01/20/21 17:00   Blood  - Blood   Anaerobic Blood Culture - Final


                            No growth in 5 days.


01/20/21 18:30   Clean Catch Urine   [place] Count - Final


                            No growth.


01/20/21 18:30   Clean Catch Urine    - Final


                            No growth.





Assessment/ Plan: 


Nephrology





No acute cardiac or pulmonary complaints.  +COOPER


Weakness and fatigue


No acute events overnight.





Vitals, medications, blood work and imaging reviewed in the chart.


NAD.  MMM.  Neck supple.  CTA.  RRR.  Soft Abd.  No C/C/E.  No rash.  Somnolent.

 Normal Speech. 





A/P:  Continue the current POC and Medications other than the changes listed.  

AM Labs PRN.  Recommend daily weight.  Please see the orders for complete 

details.





YANET


CKD 3B with protienuria


-No NSAIDs





Hyponatremia





Hypocalcemia


-Continue Vitamin D


-Continue Calcitriol





HTN with CKD/ CHF


-Continue Amlodipine and Coreg





Diastolic CHF, chronic


-Continue Coreg





DM II with CKD


-Wean steroids as tolerated





Moderate malnutrition


-Encourage nutrition


-Recommend protein supplementation





Anemia in chronic illness


GI bleed


-Retacrit PRN





BPH with LUTS


-Continue Flomax





Acute hypoxic respiratory failure due to COVID-19


-Continue steroid therapy


-Wean Oxygen as tolerated.

## 2021-02-24 RX ADMIN — Medication SCH PKT: at 20:41

## 2021-02-24 RX ADMIN — Medication SCH MG: at 20:39

## 2021-02-24 RX ADMIN — TAMSULOSIN HYDROCHLORIDE SCH MG: 0.4 CAPSULE ORAL at 20:40

## 2021-02-24 RX ADMIN — HYDROCODONE BITARTRATE AND ACETAMINOPHEN PRN TAB: 5; 325 TABLET ORAL at 09:49

## 2021-02-24 RX ADMIN — Medication SCH MG: at 09:44

## 2021-02-24 RX ADMIN — DOCUSATE SODIUM SCH MG: 100 CAPSULE, LIQUID FILLED ORAL at 09:41

## 2021-02-24 RX ADMIN — CHOLECALCIFEROL TAB 25 MCG (1000 UNIT) SCH UNIT: 25 TAB at 09:44

## 2021-02-24 RX ADMIN — AMLODIPINE BESYLATE SCH MG: 10 TABLET ORAL at 09:43

## 2021-02-24 RX ADMIN — CALCITRIOL CAPSULES 0.25 MCG SCH MCG: 0.25 CAPSULE ORAL at 09:44

## 2021-02-24 RX ADMIN — Medication SCH ML: at 09:28

## 2021-02-24 RX ADMIN — Medication SCH ML: at 16:51

## 2021-02-24 RX ADMIN — Medication SCH MG: at 20:38

## 2021-02-24 RX ADMIN — METHYLPREDNISOLONE SODIUM SUCCINATE SCH MG: 40 INJECTION, POWDER, FOR SOLUTION INTRAMUSCULAR; INTRAVENOUS at 09:41

## 2021-02-24 RX ADMIN — Medication SCH PATCH: at 09:41

## 2021-02-24 RX ADMIN — HUMAN INSULIN SCH: 100 INJECTION, SUSPENSION SUBCUTANEOUS at 07:30

## 2021-02-24 RX ADMIN — FOLIC ACID SCH MG: 1 TABLET ORAL at 09:41

## 2021-02-24 RX ADMIN — HYDROCODONE BITARTRATE AND ACETAMINOPHEN PRN TAB: 5; 325 TABLET ORAL at 20:41

## 2021-02-24 RX ADMIN — ATORVASTATIN CALCIUM SCH MG: 10 TABLET, FILM COATED ORAL at 20:40

## 2021-02-24 RX ADMIN — Medication SCH MG: at 13:41

## 2021-02-24 RX ADMIN — Medication SCH MG: at 09:41

## 2021-02-24 RX ADMIN — ENOXAPARIN SODIUM SCH MG: 40 INJECTION SUBCUTANEOUS at 09:00

## 2021-02-24 RX ADMIN — ZINC SULFATE CAP 220 MG (50 MG ELEMENTAL ZN) SCH MG: 220 (50 ZN) CAP at 09:41

## 2021-02-24 RX ADMIN — Medication SCH ML: at 12:26

## 2021-02-24 RX ADMIN — FAMOTIDINE SCH MG: 20 TABLET, FILM COATED ORAL at 09:42

## 2021-02-24 RX ADMIN — HUMAN INSULIN SCH UNITS: 100 INJECTION, SUSPENSION SUBCUTANEOUS at 16:50

## 2021-02-24 RX ADMIN — FLUCONAZOLE SCH MG: 100 TABLET ORAL at 09:44

## 2021-02-24 RX ADMIN — DEXTROSE MONOHYDRATE SCH: 50 INJECTION, SOLUTION INTRAVENOUS at 06:00

## 2021-02-24 RX ADMIN — Medication SCH APPL: at 09:45

## 2021-02-24 RX ADMIN — METHYLPREDNISOLONE SODIUM SUCCINATE SCH MG: 40 INJECTION, POWDER, FOR SOLUTION INTRAMUSCULAR; INTRAVENOUS at 20:40

## 2021-02-24 RX ADMIN — FAMOTIDINE SCH MG: 20 TABLET, FILM COATED ORAL at 20:40

## 2021-02-25 LAB
ANISOCYTOSIS BLD QL: (no result)
BLD SMEAR INTERP: (no result)
BUN BLD-MCNC: 36 MG/DL (ref 7–18)
GLUCOSE SERPLBLD-MCNC: 169 MG/DL (ref 74–106)
HCT VFR BLD CALC: 31.2 % (ref 39.6–49)
LYMPHOCYTES # SPEC AUTO: 0.6 K/UL (ref 0.7–4.9)
MAGNESIUM SERPL-MCNC: 1.8 MG/DL (ref 1.8–2.4)
MORPHOLOGY BLD-IMP: (no result)
NT-PROBNP SERPL-MCNC: 1676 PG/ML (ref ?–450)
PMV BLD: 10.3 FL (ref 7.6–11.3)
POTASSIUM SERPL-SCNC: 4.6 MMOL/L (ref 3.5–5.1)
RBC # BLD: 3.41 M/UL (ref 4.33–5.43)

## 2021-02-25 RX ADMIN — ZINC SULFATE CAP 220 MG (50 MG ELEMENTAL ZN) SCH MG: 220 (50 ZN) CAP at 09:22

## 2021-02-25 RX ADMIN — DEXTROSE MONOHYDRATE SCH MLS: 50 INJECTION, SOLUTION INTRAVENOUS at 00:04

## 2021-02-25 RX ADMIN — DEXTROSE MONOHYDRATE SCH MLS: 50 INJECTION, SOLUTION INTRAVENOUS at 22:14

## 2021-02-25 RX ADMIN — CHOLECALCIFEROL TAB 25 MCG (1000 UNIT) SCH: 25 TAB at 09:00

## 2021-02-25 RX ADMIN — FLUCONAZOLE SCH: 100 TABLET ORAL at 09:00

## 2021-02-25 RX ADMIN — FAMOTIDINE SCH: 20 TABLET, FILM COATED ORAL at 09:00

## 2021-02-25 RX ADMIN — Medication SCH: at 09:00

## 2021-02-25 RX ADMIN — ZINC SULFATE CAP 220 MG (50 MG ELEMENTAL ZN) SCH: 220 (50 ZN) CAP at 09:00

## 2021-02-25 RX ADMIN — DOCUSATE SODIUM SCH: 100 CAPSULE, LIQUID FILLED ORAL at 09:00

## 2021-02-25 RX ADMIN — HUMAN INSULIN SCH UNITS: 100 INJECTION, SUSPENSION SUBCUTANEOUS at 09:25

## 2021-02-25 RX ADMIN — CALCITRIOL CAPSULES 0.25 MCG SCH MCG: 0.25 CAPSULE ORAL at 09:22

## 2021-02-25 RX ADMIN — HYDRALAZINE HYDROCHLORIDE PRN MG: 20 INJECTION INTRAMUSCULAR; INTRAVENOUS at 13:05

## 2021-02-25 RX ADMIN — Medication SCH: at 12:00

## 2021-02-25 RX ADMIN — METHYLPREDNISOLONE SODIUM SUCCINATE SCH MG: 40 INJECTION, POWDER, FOR SOLUTION INTRAMUSCULAR; INTRAVENOUS at 09:24

## 2021-02-25 RX ADMIN — TAMSULOSIN HYDROCHLORIDE SCH MG: 0.4 CAPSULE ORAL at 22:13

## 2021-02-25 RX ADMIN — Medication SCH PKT: at 09:24

## 2021-02-25 RX ADMIN — Medication SCH PATCH: at 09:25

## 2021-02-25 RX ADMIN — CHOLECALCIFEROL TAB 25 MCG (1000 UNIT) SCH UNIT: 25 TAB at 09:22

## 2021-02-25 RX ADMIN — ATORVASTATIN CALCIUM SCH MG: 10 TABLET, FILM COATED ORAL at 22:13

## 2021-02-25 RX ADMIN — Medication SCH: at 13:03

## 2021-02-25 RX ADMIN — Medication SCH: at 08:00

## 2021-02-25 RX ADMIN — ENOXAPARIN SODIUM SCH: 40 INJECTION SUBCUTANEOUS at 09:00

## 2021-02-25 RX ADMIN — FAMOTIDINE SCH MG: 20 TABLET, FILM COATED ORAL at 22:14

## 2021-02-25 RX ADMIN — FOLIC ACID SCH: 1 TABLET ORAL at 09:00

## 2021-02-25 RX ADMIN — Medication SCH ML: at 17:00

## 2021-02-25 RX ADMIN — CALCITRIOL CAPSULES 0.25 MCG SCH: 0.25 CAPSULE ORAL at 09:00

## 2021-02-25 RX ADMIN — HYDRALAZINE HYDROCHLORIDE PRN MG: 20 INJECTION INTRAMUSCULAR; INTRAVENOUS at 22:30

## 2021-02-25 RX ADMIN — FLUCONAZOLE SCH MG: 100 TABLET ORAL at 09:23

## 2021-02-25 RX ADMIN — Medication SCH MG: at 22:14

## 2021-02-25 RX ADMIN — FAMOTIDINE SCH MG: 20 TABLET, FILM COATED ORAL at 09:22

## 2021-02-25 RX ADMIN — METHYLPREDNISOLONE SODIUM SUCCINATE SCH MG: 40 INJECTION, POWDER, FOR SOLUTION INTRAMUSCULAR; INTRAVENOUS at 22:30

## 2021-02-25 RX ADMIN — AMLODIPINE BESYLATE SCH: 10 TABLET ORAL at 09:00

## 2021-02-25 RX ADMIN — FOLIC ACID SCH MG: 1 TABLET ORAL at 09:22

## 2021-02-25 RX ADMIN — HUMAN INSULIN SCH UNITS: 100 INJECTION, SUSPENSION SUBCUTANEOUS at 16:52

## 2021-02-25 RX ADMIN — Medication SCH APPL: at 10:10

## 2021-02-25 RX ADMIN — Medication SCH PKT: at 21:00

## 2021-02-25 RX ADMIN — Medication SCH ML: at 09:24

## 2021-02-25 RX ADMIN — AMLODIPINE BESYLATE SCH MG: 10 TABLET ORAL at 09:00

## 2021-02-25 RX ADMIN — Medication SCH MG: at 09:22

## 2021-02-25 RX ADMIN — Medication SCH MG: at 09:23

## 2021-02-25 RX ADMIN — DOCUSATE SODIUM SCH MG: 100 CAPSULE, LIQUID FILLED ORAL at 09:22

## 2021-02-25 NOTE — P.PN
Date of Service: 02/25/21





 Subjective 


Subjective: 


Patient is agitated.  Continuing to desat quite a bit.  Prognosis is poor.  

Would probably need to get him set-up with hospice care.





 Physical Examination 





- Vital Signs


Reviewed





- Physical Exam


General: Alert, In no apparent distress, Demented


Respiratory:  Diminished breath sound bilaterally; otherwise basilar crackles


Cardiovascular: Regular rate/rhythm, Normal S1 S2, No murmurs


Gastrointestinal: Normal bowel sounds, Soft and benign, Non-distended, No 

tenderness


Musculoskeletal: No clubbing, No swelling, No tenderness





 Assessment & Plan 





- Problems (Diagnosis)


(1) Pneumonia due to COVID-19 virus; severe hypoxemia


Current Visit: Yes   Status: Acute   





(2) YANET (acute kidney injury)


Current Visit: Yes   Status: Acute   





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


Current Visit: Yes   Status: Acute   





(4) UTI (urinary tract infection)


Onset Date: 09/18/17   Current Visit: No   Status: Acute   





(5) Weakness generalized


Onset Date: 09/18/17   Current Visit: No   Status: Acute   





(6) Hypertension


Current Visit: Yes   Status: Acute   





(7) Type 2 diabetes mellitus


Current Visit: Yes   Status: Acute   





(8) CKD (chronic kidney disease)


Current Visit: Yes   Status: Acute   





(9) Leukocytosis


Onset Date: 09/18/17   Current Visit: No   Status: Acute   





(10) LGIB; hemorrhoidal bleeding


Onset Date: 09/18/17   Current Visit: No   Status: Acute   





- Plan


Continue with plan of care as mentioned below


1. Cont supportive care


2. Wean steroids


3. Patient is a DNR 


4. Advance diet as tolerated; 


5. Supportive care along with wound care for stage II decubitus ulcer


6. GI and DVT prophylaxis

## 2021-02-25 NOTE — P.PN
Date of Service: 02/24/21





 Subjective 


Subjective: 


Spoke with daughter.  She does not want to move him to another hospital.  We 

talked about long-term acute care hospital settings.  We also have talked about 

hospice setting.





 Physical Examination 





- Vital Signs


Reviewed





- Physical Exam


General: Alert, In no apparent distress, Demented


Respiratory:  Diminished breath sound bilaterally; otherwise basilar crackles


Cardiovascular: Regular rate/rhythm, Normal S1 S2, No murmurs


Gastrointestinal: Normal bowel sounds, Soft and benign, Non-distended, No 

tenderness


Musculoskeletal: No clubbing, No swelling, No tenderness





 Assessment & Plan 





- Problems (Diagnosis)


(1) Pneumonia due to COVID-19 virus; severe hypoxemia


Current Visit: Yes   Status: Acute   





(2) YANET (acute kidney injury)


Current Visit: Yes   Status: Acute   





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


Current Visit: Yes   Status: Acute   





(4) UTI (urinary tract infection)


Onset Date: 09/18/17   Current Visit: No   Status: Acute   





(5) Weakness generalized


Onset Date: 09/18/17   Current Visit: No   Status: Acute   





(6) Hypertension


Current Visit: Yes   Status: Acute   





(7) Type 2 diabetes mellitus


Current Visit: Yes   Status: Acute   





(8) CKD (chronic kidney disease)


Current Visit: Yes   Status: Acute   





(9) Leukocytosis


Onset Date: 09/18/17   Current Visit: No   Status: Acute   





(10) LGIB; hemorrhoidal bleeding


Onset Date: 09/18/17   Current Visit: No   Status: Acute   





- Plan


Continue with plan of care as mentioned below


1. Continue monitoring labs closely


2. taper steroid usage


3. Patient is a do not resuscitate; 


4. Advanced diet as tolerated; poor nutritional status


5. Supportive care along with wound care for stage II decubitus ulcer


6. GI and DVT prophylaxis

## 2021-02-25 NOTE — P.PN
Date of Service: 02/25/21


Vital Signs











Temp Pulse Resp BP Pulse Ox


 


 97.3 F   67   18   152/72 H  90 L


 


 02/25/21 16:00  02/25/21 16:00  02/25/21 16:00  02/25/21 16:00  02/25/21 16:00





Medications





Hydrocodone Bitart/Acetaminophen (Hydrocodone/Apap 5/325 Mg Tab)  1 tab PO Q6H 

PRN


   PRN Reason: Pain scale 5-7 (Moderate)


   Last Admin: 02/24/21 20:41 Dose:  1 tab


   Documented by: 


Amlodipine Besylate (Amlodipine 10 Mg Tab)  10 mg PO DAILY [facility]


   Last Admin: 02/25/21 09:00 Dose:  Not Given


   Documented by: 


Ascorbic Acid (Ascorbic Acid 500 Mg Tablet)  500 mg PO TID [facility]


   Last Admin: 02/25/21 13:03 Dose:  Not Given


   Documented by: 


Atorvastatin Calcium (Atorvastatin 10 Mg Tab)  10 mg PO BEDTIME [facility]


   Last Admin: 02/24/21 20:40 Dose:  10 mg


   Documented by: 


Benzonatate (Benzonatate 100 Mg Cap)  200 mg PO Q4HP PRN


   PRN Reason: COUGH


Calcitriol (Calcitrol 0.25 Mcg Cap)  0.5 mcg PO DAILY [facility]


   Last Admin: 02/25/21 09:00 Dose:  Not Given


   Documented by: 


Carvedilol (Carvedilol 12.5 Mg Tab)  12.5 mg PO DAILY [facility]


   Last Admin: 02/25/21 09:00 Dose:  Not Given


   Documented by: 


Cholecalciferol (Vitamin D 1000 Unit Tab)  2,000 unit PO DAILY [facility]


   Last Admin: 02/25/21 09:00 Dose:  Not Given


   Documented by: 


Dextrose (D50w 25 Gm/50 Ml Vial)  12.5 gm IV PRN PRN; Protocol


   PRN Reason: HYPOGLYCEMIA


Docusate Sodium (Docusate Na 100 Mg Cap)  100 mg PO DAILY [facility]


   Last Admin: 02/25/21 09:00 Dose:  Not Given


   Documented by: 


Emollient Gel (Medihoney 44 Ml Topical Tube)  1 appl TOP DAILY [facility]


   Last Admin: 02/25/21 10:10 Dose:  1 appl


   Documented by: 


Enoxaparin Sodium (Enoxaparin 40 Mg/0.4 Ml)  40 mg SQ DAILY [facility]


   Last Admin: 02/25/21 09:00 Dose:  Not Given


   Documented by: 


Enteral Nutritional Formula (Glucerna Shake 237 Ml Can)  237 ml PO TIDWM [facility]


   Last Admin: 02/25/21 17:00 Dose:  237 ml


   Documented by: 


Famotidine (Famotidine 20 Mg Tab)  20 mg PO BID [facility]; Protocol


   Last Admin: 02/25/21 09:00 Dose:  Not Given


   Documented by: 


Fluconazole (Fluconazole 100 Mg Tab)  100 mg PO DAILY [facility]; Protocol


   Last Admin: 02/25/21 09:00 Dose:  Not Given


   Documented by: 


Folic Acid (Folic Acid 1 Mg Tablet)  1 mg PO DAILY [facility]


   Last Admin: 02/25/21 09:00 Dose:  Not Given


   Documented by: 


Glucagon (Glucagon 1 Mg/Vial)  1 mg IM 1X PRN; Protocol


   PRN Reason: HYPOGLYCEMIA


Hydralazine HCl (Hydralazine Hcl 20 Mg/Ml Vial)  10 mg IV Q4HP PRN


   PRN Reason: Titrate to SBP (MUST DEFINE)


   Last Admin: 02/25/21 13:05 Dose:  10 mg


   Documented by: 


Dextrose/Water (Dextrose In Water (1-Liter))  1,000 mls @ 50 mls/hr IV .Q20H [facility]


   Last Admin: 02/25/21 00:04 Dose:  1,000 mls


   Documented by: 


Insulin Human Isoph/Insulin Regular (Insulin 70/30 100 Units/Ml)  10 unit SQ 

BIDAC [facility]


   Last Admin: 02/25/21 16:52 Dose:  10 units


   Documented by: 


L-Arginine/L-Glutamine/HMB ([place] Packet)  1 pkt PO BID [facility]


   Last Admin: 02/25/21 09:00 Dose:  Not Given


   Documented by: 


Lactulose (Lactulose 20 Gm/30 Ml Ucup)  10 gm PO BID PRN


   PRN Reason: CONSTIPATION


   Last Admin: 02/20/21 20:25 Dose:  10 gm


   Documented by: 


Lidocaine (Lidocaine 4% Patch)  1 patch TOP DAILY [facility]


   Last Admin: 02/25/21 09:25 Dose:  1 patch


   Documented by: 


Lorazepam (Lorazepam 2 Mg/Ml Vial)  0.25 mg IV Q4H PRN


   PRN Reason: ANXIETY


   Last Admin: 02/25/21 13:38 Dose:  0.25 mg


   Documented by: 


Methylprednisolone Sodium Succinate (Methylprednisolone 40 Mg Inj)  40 mg IV BID

[facility]


   Last Admin: 02/25/21 09:24 Dose:  40 mg


   Documented by: 


Morphine Sulfate (Morphine 2 Mg/Ml Syr)  2 mg IV Q6H PRN


   PRN Reason: Pain scale 5-7 (Moderate)


Sodium Chloride (Sodium Chloride 0.9% 10ml Inj)  10 ml IV UD PRN


   PRN Reason: Diluant


   Stop: 02/28/21 16:17


Sterile Water (Water For Inj,Sterile 10 Ml)  1.2 ml IM UD PRN


   PRN Reason: DILUTION OF MED


Tamsulosin HCl (Tamsulosin 0.4 Mg Sr Cap)  0.4 mg PO BEDTIME [facility]


   Last Admin: 02/24/21 20:40 Dose:  0.4 mg


   Documented by: 


Thiamine HCl (Thiamine Hcl 100 Mg Tablet)  200 mg PO BID [facility]


   Last Admin: 02/25/21 09:00 Dose:  Not Given


   Documented by: 


Zinc Sulfate (Zinc Sulfate 220 Mg Cap)  220 mg PO DAILY [facility]


   Last Admin: 02/25/21 09:00 Dose:  Not Given


   Documented by: 


Microbiology Results





01/20/21 18:00   Blood  - Blood   Aerobic Blood Culture - Final


                            No growth in 5 days.


01/20/21 18:00   Blood  - Blood   Anaerobic Blood Culture - Final


                            No growth in 5 days.


01/20/21 17:00   Blood  - Blood   Aerobic Blood Culture - Final


                            No growth in 5 days.


01/20/21 17:00   Blood  - Blood   Anaerobic Blood Culture - Final


                            No growth in 5 days.


01/20/21 18:30   Clean Catch Urine   [place] Count - Final


                            No growth.


01/20/21 18:30   Clean Catch Urine    - Final


                            No growth.





Assessment/ Plan: 


Nephrology





No acute cardiac or pulmonary complaints.  +COOPER


Weakness and fatigue


No acute events overnight.





Vitals, medications, blood work and imaging reviewed in the chart.


NAD.  MMM.  Neck supple.  CTA.  RRR.  Soft Abd.  No C/C/E.  No rash.  Awake.  

Normal Speech. 





A/P:  Continue the current POC and Medications other than the changes listed.  

AM Labs PRN.  Recommend daily weight.  Please see the orders for complete 

details.





YANET


CKD 3A with protienuria


-No NSAIDs





Hyponatremia





Hypocalcemia


-Continue Vitamin D


-Continue Calcitriol





HTN with CKD/ CHF


-Continue Amlodipine and Coreg





Diastolic CHF, chronic


-Continue Coreg





DM II with CKD


-Wean steroids as tolerated





Moderate malnutrition


-Encourage nutrition


-Recommend protein supplementation





Anemia in chronic illness


GI bleed


-Retacrit PRN





BPH with LUTS


-Continue Flomax





Acute hypoxic respiratory failure due to COVID-19


-Continue steroid therapy


-Wean Oxygen as tolerated.

## 2021-02-25 NOTE — P.PN
Date of Service: 02/22/21





 Subjective 


Subjective: 


Patient is doing better with no new complaints.  We need to try continue weaning

down the oxygen





 Physical Examination 





- Vital Signs


Reviewed





- Physical Exam


General: Alert, In no apparent distress, Demented


Respiratory:  Diminished breath sound bilaterally; otherwise basilar crackles


Cardiovascular: Regular rate/rhythm, Normal S1 S2, No murmurs


Gastrointestinal: Normal bowel sounds, Soft and benign, Non-distended, No 

tenderness


Musculoskeletal: No clubbing, No swelling, No tenderness





 Assessment & Plan 





- Problems (Diagnosis)


(1) Pneumonia due to COVID-19 virus; severe hypoxemia


Current Visit: Yes   Status: Acute   





(2) YANET (acute kidney injury)


Current Visit: Yes   Status: Acute   





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


Current Visit: Yes   Status: Acute   





(4) UTI (urinary tract infection)


Onset Date: 09/18/17   Current Visit: No   Status: Acute   





(5) Weakness generalized


Onset Date: 09/18/17   Current Visit: No   Status: Acute   





(6) Hypertension


Current Visit: Yes   Status: Acute   





(7) Type 2 diabetes mellitus


Current Visit: Yes   Status: Acute   





(8) CKD (chronic kidney disease)


Current Visit: Yes   Status: Acute   





(9) Leukocytosis


Onset Date: 09/18/17   Current Visit: No   Status: Acute   





(10) LGIB; hemorrhoidal bleeding


Onset Date: 09/18/17   Current Visit: No   Status: Acute   





- Plan


Continue with plan of care as mentioned below


1. heplock IV; repeat labs as needed-monitor fluids closely


2. Monitor steroid usage


3. Patient is a do not resuscitate; need to readdress and discuss with family 

regarding plan of care


4. Advanced diet as tolerated


5. Encourage physical therapy on Monday


6. GI and DVT prophylaxis

## 2021-02-25 NOTE — P.PN
Date of Service: 02/23/21





 Subjective 


Subjective: 


We need to keep him off the non-rebreather and wean down the oxygen saturation 

and keep his sats greater than 85%.





 Physical Examination 





- Vital Signs


Reviewed





- Physical Exam


General: Alert, In no apparent distress, Demented


Respiratory:  Diminished breath sound bilaterally; otherwise basilar crackles


Cardiovascular: Regular rate/rhythm, Normal S1 S2, No murmurs


Gastrointestinal: Normal bowel sounds, Soft and benign, Non-distended, No 

tenderness


Musculoskeletal: No clubbing, No swelling, No tenderness





 Assessment & Plan 





- Problems (Diagnosis)


(1) Pneumonia due to COVID-19 virus; severe hypoxemia


Current Visit: Yes   Status: Acute   





(2) YANET (acute kidney injury)


Current Visit: Yes   Status: Acute   





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


Current Visit: Yes   Status: Acute   





(4) UTI (urinary tract infection)


Onset Date: 09/18/17   Current Visit: No   Status: Acute   





(5) Weakness generalized


Onset Date: 09/18/17   Current Visit: No   Status: Acute   





(6) Hypertension


Current Visit: Yes   Status: Acute   





(7) Type 2 diabetes mellitus


Current Visit: Yes   Status: Acute   





(8) CKD (chronic kidney disease)


Current Visit: Yes   Status: Acute   





(9) Leukocytosis


Onset Date: 09/18/17   Current Visit: No   Status: Acute   





(10) LGIB; hemorrhoidal bleeding


Onset Date: 09/18/17   Current Visit: No   Status: Acute   





- Plan


Continue with plan of care as mentioned below


1. Recheck renal function


2. Monitor steroid usage


3. Patient is a do not resuscitate; 


4. Advanced diet as tolerated


5. Encourage physical therapy on Monday


6. GI and DVT prophylaxis

## 2021-02-26 ENCOUNTER — HOSPITAL ENCOUNTER (INPATIENT)
Dept: HOSPITAL 97 - 4TH | Age: 86
LOS: 5 days | Discharge: HOSPICE | DRG: 951 | End: 2021-03-03
Attending: FAMILY MEDICINE | Admitting: HOSPITALIST
Payer: COMMERCIAL

## 2021-02-26 VITALS — TEMPERATURE: 96.1 F | SYSTOLIC BLOOD PRESSURE: 168 MMHG | DIASTOLIC BLOOD PRESSURE: 76 MMHG

## 2021-02-26 VITALS — BODY MASS INDEX: 30.7 KG/M2

## 2021-02-26 VITALS — OXYGEN SATURATION: 93 %

## 2021-02-26 DIAGNOSIS — Z51.5: Primary | ICD-10-CM

## 2021-02-26 LAB
BUN BLD-MCNC: 31 MG/DL (ref 7–18)
CRP SERPL-MCNC: 18.7 MG/L (ref ?–3)
FERRITIN SERPL-MCNC: 231.4 NG/ML (ref 26–388)
GLUCOSE SERPLBLD-MCNC: 82 MG/DL (ref 74–106)
HCT VFR BLD CALC: 32.8 % (ref 39.6–49)
LYMPHOCYTES # SPEC AUTO: 0.4 K/UL (ref 0.7–4.9)
MAGNESIUM SERPL-MCNC: 1.9 MG/DL (ref 1.8–2.4)
NT-PROBNP SERPL-MCNC: 1302 PG/ML (ref ?–450)
PMV BLD: 10.6 FL (ref 7.6–11.3)
POTASSIUM SERPL-SCNC: 4.7 MMOL/L (ref 3.5–5.1)
RBC # BLD: 3.6 M/UL (ref 4.33–5.43)

## 2021-02-26 PROCEDURE — 94660 CPAP INITIATION&MGMT: CPT

## 2021-02-26 RX ADMIN — FAMOTIDINE SCH MG: 20 TABLET, FILM COATED ORAL at 09:19

## 2021-02-26 RX ADMIN — Medication SCH: at 12:50

## 2021-02-26 RX ADMIN — MORPHINE SULFATE SCH: 2 INJECTION, SOLUTION INTRAMUSCULAR; INTRAVENOUS at 17:00

## 2021-02-26 RX ADMIN — DOCUSATE SODIUM SCH MG: 100 CAPSULE, LIQUID FILLED ORAL at 09:20

## 2021-02-26 RX ADMIN — ZINC SULFATE CAP 220 MG (50 MG ELEMENTAL ZN) SCH MG: 220 (50 ZN) CAP at 09:19

## 2021-02-26 RX ADMIN — MORPHINE SULFATE PRN MG: 2 INJECTION, SOLUTION INTRAMUSCULAR; INTRAVENOUS at 15:05

## 2021-02-26 RX ADMIN — AMLODIPINE BESYLATE SCH MG: 10 TABLET ORAL at 09:19

## 2021-02-26 RX ADMIN — MORPHINE SULFATE PRN MG: 2 INJECTION, SOLUTION INTRAMUSCULAR; INTRAVENOUS at 09:52

## 2021-02-26 RX ADMIN — Medication SCH: at 09:00

## 2021-02-26 RX ADMIN — Medication SCH APPL: at 09:25

## 2021-02-26 RX ADMIN — Medication SCH: at 08:00

## 2021-02-26 RX ADMIN — HUMAN INSULIN SCH: 100 INJECTION, SUSPENSION SUBCUTANEOUS at 07:30

## 2021-02-26 RX ADMIN — CALCITRIOL CAPSULES 0.25 MCG SCH MCG: 0.25 CAPSULE ORAL at 09:20

## 2021-02-26 RX ADMIN — Medication SCH PATCH: at 09:21

## 2021-02-26 RX ADMIN — DEXTROSE MONOHYDRATE SCH MLS: 50 INJECTION, SOLUTION INTRAVENOUS at 15:12

## 2021-02-26 RX ADMIN — FLUCONAZOLE SCH MG: 100 TABLET ORAL at 09:20

## 2021-02-26 RX ADMIN — Medication SCH MG: at 09:20

## 2021-02-26 RX ADMIN — CHOLECALCIFEROL TAB 25 MCG (1000 UNIT) SCH UNIT: 25 TAB at 09:19

## 2021-02-26 RX ADMIN — DEXTROSE MONOHYDRATE SCH MLS: 50 INJECTION, SOLUTION INTRAVENOUS at 14:22

## 2021-02-26 RX ADMIN — FOLIC ACID SCH MG: 1 TABLET ORAL at 09:19

## 2021-02-26 RX ADMIN — Medication SCH: at 12:00

## 2021-02-26 RX ADMIN — MORPHINE SULFATE SCH MG: 2 INJECTION, SOLUTION INTRAMUSCULAR; INTRAVENOUS at 20:57

## 2021-02-26 RX ADMIN — ENOXAPARIN SODIUM SCH: 40 INJECTION SUBCUTANEOUS at 09:00

## 2021-02-26 RX ADMIN — Medication SCH MG: at 09:22

## 2021-02-26 NOTE — RAD REPORT
EXAM DESCRIPTION:  RAD - Chest Single View - 2/26/2021 4:41 am

 

CLINICAL HISTORY:  pneumonia

 

COMPARISON:  February 14

 

TECHNIQUE:  AP portable chest image was obtained 2/26/2021 4:41 am .

 

FINDINGS:  Lung volumes are low but improved from the prior study. Interstitial and alveolar opacitie
s are present throughout the lung fields also showing interval improvement. Cardiomegaly and vascular
 engorgement are still present. No pneumothorax or large pleural effusion. No measurable pleural effu
ronny and no pneumothorax. No acute bony abnormality seen. No acute aortic findings suspected.

 

IMPRESSION:  Bilateral pneumonia or edema present in both lung fields significantly improved from the
 February 14 study.

## 2021-02-26 NOTE — P.PN
Subjective


Date of Service: 02/26/21


Primary Care Provider: Dr. Rodrigues


Chief Complaint: Respiratory failure from corona virus


Patient is not doing well he continues to remain agitated confused combative 

acquiring high concentrations of oxygen





Review of Systems


is unable to be obtained





Physical Examination





- Vital Signs


Temperature: 96.9 F


Blood Pressure: 153/82


Pulse: 79


Respirations: 18


Pulse Ox (%): 94





- Studies


Medications List Reviewed: Yes





Assessment & Plan





- Problems (Diagnosis)


(1) Acute respiratory failure due to severe acute respiratory syndrome 

coronavirus 2 (SARS-CoV-2) infection


Current Visit: Yes   Status: Acute   


Plan: 


Respiratory failure patient is not doing well as not made any progress recommend

hospice care is been year a long time without any change recommend withdrawal of

care he has been here for over a month not cooperate

## 2021-02-26 NOTE — XMS REPORT
Continuity of Care Document

                          Created on:2021



Patient:ZUNILDA HINES

Sex:Male

:1933

External Reference #:[id number]





Demographics







                          Address                   [address]



                                                    [place] 



                                                    [place] [address]

 

                          Home Phone                [phone]

 

                          Mobile Phone              [phone]

 

                          Email Address             [address]

 

                          Preferred Language        English

 

                          Marital Status            Unknown

 

                          Orthodox Affiliation     Unknown

 

                          Race                      Unknown

 

                          Additional Race(s)        Black or 



                                                    Unavailable

 

                          Ethnic Group              Unknown









Author







                          Organization              [facility]

t

 

                          Address                   [address]



                                                    [place] [address]

 

                          Phone                     [phone]









Support







                Name            Relationship    Address         Phone

 

                Will Hines Daughter        Unavailable     [phone]

 

                Yaritza Childers Daughter        Unavailable     [phone]









Care Team Providers







                    Name                Role                Phone

 

                    Abdullahi Rodrigues MD  Primary Care Physician [phone]

 

                    Jenise Escamilla MD Attending Clinician [phone] 

 

                    JONN DAWSON             Attending Clinician Unavailable

 

                    TRINIDAD GOLDMAN    Attending Clinician Unavailable

 

                    HELADIO MUÑOZ         Admitting Clinician Unavailable

 

                    TRINIDAD GOLDMAN    Admitting Clinician Unavailable









Problems







       Condition Condition Condition Status Onset  Resolution Last   Treating Co

mments 

Source



       Name   Details Category        Date   Date   Treatment Clinician        



                                                 Date                 

 

       Altered Altered Disease Active                              [facility]



       mental mental               9-29                               [facility]



       status status               00:00:                             Medical



                                   00                                 [place]

 

       Syncope, Syncope, Disease Active                              [facility]

[facility]



       unspecifie unspecifie                                              [facility]

[facility] -



       d syncope d syncope               00:00:                             Medi

[facility]



       type   type                 00                                 Center

 

       Carotid Carotid Disease Active                              [facility]



       artery artery               8-20                               [facility]



       stenosis stenosis               00:00:                             [facility]

l



                                   00                                 Center

 

       S/P    S/P    Disease Active 2019                             [facility]



       carotid carotid               8-20                               [facility]



       endarterec endarterec               00:00:                             Me

dical



       roseann   roseann                 00                                 [place]



       ([facility] ([facility]                                                  



       )  )                                                   

 

       Respirator Respirator Disease Active -                             [facility]

[facility]



       y      y                    8-20                               [facility]



       insufficie insufficie               00:00:                             Me

dical



       ncy    ncy                  00                                 Center

 

       Hypertensi Hypertensi Disease Active 2019                             [facility]

[facility]



       ve urgency ve urgency               8-                               [facility]

[facility] -



                                   00:00:                             Medical



                                   00                                 Center

 

       Diabetes Diabetes Disease Active                              [facility]

[facility]



       mellitus mellitus                                              [facility] 

-



                                   00:00:                             Medical



                                   00                                 Center

 

       GERD   GERD   Disease Active                              [facility]



       (gastroeso (gastroeso                                              [facility]

[facility] -



       phageal phageal               00:00:                             Medical



       reflux reflux               00                                 Center



       disease) disease)                                                  

 

       Hyperlipid Hyperlipid Disease Active                              [facility]

[facility]



       emia   emia                                                [facility]



                                   00:00:                             Medical



                                   00                                 [place]

 

       Hypertensi Hypertensi Disease Active                              [facility]

[facility]



       on     on                                                  [facility]



                                   00:00:                             Medical



                                   00                                 [place]

 

       Carotid Carotid Disease Active                                    [facility]



       artery artery                                                  [facility] -



       occlusion occlusion                                                  [facility]







Allergies, Adverse Reactions, Alerts

This patient has no known allergies or adverse reactions.



Family History







           Family Member Diagnosis  Comments   Start Date Stop Date  Source

 

           Natural sister Heart disease                                  [facility]

 

           Natural brother Heart disease                                  [facility]

 

           Natural father Heart disease                                  [facility]

 

           Natural father Hyperlipidemia                                  [facility]

 

           Natural father Hypertension                                  [facility]

 

           Natural mother Heart disease                                  [facility]

 

           Natural mother Hyperlipidemia                                  [facility]

 

           Natural mother Hypertension                                  [facility]







Social History







           Social Habit Start Date Stop Date  Quantity   Comments   Source

 

           [facility]

 -



           Alcohol Std Drinks                                             [facility]

[facility]

 

           [facility]

 -



           Alcohol Binge                                             Medical [facility]

ter

 

           Sex Assigned At                                             [facility]

 

           Tobacco use and 2019 Never used            [facility] -



           exposure   00:00:00   00:00:00                         [facility]

 

           Alcohol intake 2019 Current               [facility]

es -



                      00:00:00   00:00:00   non-drinker of            Medical 

nter



                                            alcohol               



                                            (finding)             

 

           History SDOH 2019 1                     [facility]

 -



           Alcohol Frequency 00:00:00   00:00:00                         [facility]









                Smoking Status  Start Date      Stop Date       Source

 

                Never smoker                                    [facility]







Medications







       Ordered Filled Start  Stop   Current Ordering Indication Dosage Frequency

 Signature

                    Comments            Components          Source



     Medication Medication Date Date Medication? Clinician                (SIG) 

          



     Name Name                                                   

 

     losartan      2019 2020- No             25mg QD   Take 1           [facility]



     (COZAAR) 25      0-03 10-02                          tablet (25           L

[facility] -



     MG tablet      00:00: 23:59                          mg total)           Me

dical



               00   :00                           by mouth           Center



                                                  daily.           

 

     glipiZIDE      2019      Yes            5mg  QD   Take 1           [facility]



     (GLUCOTROL)      0-02                               tablet (5           [place]

es -



     5 MG tablet      00:00:                               mg total)           M

edical



               00                                 by mouth           Center



                                                  daily.           

 

     famotidine      2019      Yes            20mg QD   Take 1           [facility]

[facility]



     (PEPCID) 20      0-02                               tablet (20           [facility]

[facility] -



     MG tablet      00:00:                               mg total)           Med

ical



               00                                 by mouth           Center



                                                  daily.           

 

     aspirin 81      2020- No             81mg QD   Take 1           [facility]



     MG EC                                tablet (81           [facility]



     tablet      00:00: 23:59                          mg total)           Medic

al



               00   :00                           by mouth           Center



                                                  daily.           

 

     clopidogrel      2020- No             75mg QD   Take 1           [facility]



     (PLAVIX) 75                                tablet (75           L

ukes -



     mg tablet      00:00: 23:59                          mg total)           Me

dical



               00   :00                           by mouth           Center



                                                  daily.           

 

     carvedilol            Yes            12.5mg Q.5D Take 12.5           

[facility]



     (COREG)      7-25                               mg by           [facility]



     12.5 MG      00:00:                               mouth 2           Medical



     tablet      00                                 (two)           Center



                                                  times           



                                                  daily.           

 

     tamsulosin            Yes            .4mg Q.5D Take 0.4           [facility]



     (FLOMAX)      7-01                               mg by           [facility]



     0.4 mg Cap      00:00:                               mouth 2           Medi

daniel



     24 hr      00                                 (two)           Center



     capsule                                         times           



                                                  daily.           

 

     lactulose            Yes                      TK 30 ML           [facility]



     (GENERLAC)      2-15                               PO BID PRF           [place]

es -



     10 gram/15      00:00:                               CONSTIPATI           M

edical



     mL solution      00                                 ON             Center

 

     lovastatin            Yes            20mg QD   Take 20 mg           C

[facility]



     (MEVACOR)      1-06                               by mouth           [facility] 

-



     20 MG      00:00:                               every           Medical



     tablet      00                                 evening.           Center

 

     amLODIPine            Yes            10mg QD   Take 10 mg           C

[facility]



     (NORVASC)      1-05                               by mouth           [facility] 

-



     10 MG      00:00:                               daily.           Medical



     tablet      00                                                Center







Procedures

This patient has no known procedures.



Plan of Care







             Planned Activity Planned Date Details      Comments     Source

 

             Future Scheduled 2020   INFLUENZA VACCINE (#1)              [facility]

[facility]



             Test         00:00:00     [code = INFLUENZA              Medical Ce

nter



                                       VACCINE (#1)]              

 

             Future Scheduled 2020   Urine screening for              [facility]



             Test         00:00:00     protein (procedure)              Medical 

Center



                                       [code = 844476524]              

 

             Future Scheduled 2020   Hemoglobin A1c              [facility]

[facility] -



             Test         00:00:00     measurement               Medical Center



                                       (procedure) [code =              



                                       24211287]                 

 

             Future Scheduled 2020   MEDICARE ANNUAL              [facility]

[facility] -



             Test         00:00:00     WELLNESS (YEAR 2 or              Medical 

Center



                                       FIRST YEAR if no IPPE)              



                                       [code = MEDICARE              



                                       ANNUAL WELLNESS (YEAR              



                                       2 or FIRST YEAR if no              



                                       IPPE)]                    

 

             Future Scheduled 1998-10-03   PNEUMOCOCCAL 65+ YRS              [facility]



             Test         00:00:00     (1 of 1 -                 Medical Center



                                       [id number])              



                                       [code = PNEUMOCOCCAL              



                                       65+ YRS (1 of 1 -              



                                       [id number] PCV13)]              

 

             Future Scheduled 1943-10-03   DIABETIC EYE EXAM              [facility]



             Test         00:00:00     [code = DIABETIC EYE              Medical

 Center



                                       EXAM]                     

 

             Future Scheduled 1943-10-03   Diabetic foot              [facility]

es -



             Test         00:00:00     examination               Medical Center



                                       (regime/therapy) [code              



                                       = [id number]]              







Encounters







        Start   End     Encounter Admission Attending Care    Care    Encounter 

Source



        Date/Time Date/Time Type    Type    Clinicians Facility Department ID   

   

 

        2020 Office          Khurram ACEVEDO     [id number]

97 



        13:53:57 14:23:57 Visit           Sendy, AMBULATOR [id number]         



                                        Jenise  Y       [id number]         



                                        Sally          [phone]         



                                                        300             







Results







           Test Description Test Time  Test Comments Results    Result Comments 

Source









                    POCT-GLUCOSE METER  2019-10-02 12:05:00 









                      Test Item  Value      Reference Range Interpretation Comme

nts









             POC-GLUCOSE METER (BEAKER) (test 140 mg/dL           H       

     TESTED AT [facility] [address]



             code = 1538)                                        [facility] [address]

0



POCT-GLUCOSE METER2019-10-02 09:37:00





             Test Item    Value        Reference Range Interpretation Comments

 

             POC-GLUCOSE METER 133 mg/dL           H            TESTED AT 

[facility] [address]



             (BEAKER) (test code =                                        [address] [facility]



             1538)                                               [address]



BASIC METABOLIC PANEL2019-10-02 06:58:00





             Test Item    Value        Reference Range Interpretation Comments

 

             SODIUM (BEAKER) 135 meq/L    136-145      L            



             (test code = 381)                                        

 

             POTASSIUM (BEAKER) 3.9 meq/L    3.5-5.1                   



             (test code = 379)                                        

 

             CHLORIDE (BEAKER) 105 meq/L                        



             (test code = 382)                                        

 

             CO2 (BEAKER) (test 22 meq/L     22-29                     



             code = 355)                                         

 

             BLOOD UREA NITROGEN 27 mg/dL     7-21         H            



             (BEAKER) (test code                                        



             = 354)                                              

 

             CREATININE (BEAKER) 1.61 mg/dL   0.57-1.25    H            



             (test code = 358)                                        

 

             GLUCOSE RANDOM 116 mg/dL           H            



             (BEAKER) (test code                                        



             = 652)                                              

 

             CALCIUM (BEAKER) 9.2 mg/dL    8.4-10.2                  



             (test code = 697)                                        

 

             EGFR (BEAKER) (test 50 mL/min/1.73                           [facility]

YESSICA GFR IS



             code = 1092) sq m                                   NOT AS ACCURATE

 AS



                                                                 CREATININE



                                                                 CLEARANCE IN



                                                                 PREDICTING



                                                                 GLOMERULAR



                                                                 FILTRATION RATE

.



                                                                 ESTIMATED GFR I

S



                                                                 NOT APPLICABLE 

FOR



                                                                 DIALYSIS PATIEN

TS.



POCT-GLUCOSE METER2019-10-01 21:04:00





             Test Item    Value        Reference Range Interpretation Comments

 

             POC-GLUCOSE METER 161 mg/dL           H            TESTED AT 

[facility] [address]



             (BEAKER) (test code =                                        [address] [place]



             1538)                                               [address]



EEG AWAKE AND DROWSY2019-10-01 18:24:00Reason for exam:-&gt;CEREBROVASCULAR 
ACCIDENTDate(s) of EEG:  10/1/2019     DATE OF REPORT:  10/1/2019 ACC:   
[id number] EEG Number: [id number] TestLocation: Inpatient Room Start time: 
10/1/2019 09:03 Stop time: 10/1/2019 09:24 ICD-10:   R41.82, R55 CPT Code:   
15968    HISTORY:   85 y.o. male with hypertension, chronic kidney disease, 
right carotid stenosis status post right CEA who presented with syncope and 
altered mental status.    MEDICATIONS THAT COULD AFFECT EEG:  Amlodipine, 
Atorvastatin, Carvedilol, Famotidine, Losartan, Tamsulosin   TECHNICAL SUMMARY: 
This is a digital video-EEG recorded with 32 input channels reviewed with 
bipolar and referential montages using the modified combinatorial system 
nomenclature.    DESCRIPTION OF RECORD:  During the maximally alert state a 8.5 
Hz posterior dominant rhythm was seen that was symmetric, reactive to eye 
opening and well regulated.  More anteriorly, low voltage frontocentral beta 
predominated.  Drowsiness was characterized by decreased eye blinks, alpha 
attenuation, increased frontocentral theta, and increased frontocentral delta. 
Stage 2 sleep was not reached.    SIGNIFICANT VIDEO EVENTS: None   SIGNIFICANT 
ELECTROCARDIOGRAM EVENTS: None   HV: Hyperventilation was not performed.     PH
OTIC STIMULATION: Photic stimulation was done from 3-30 Hz; no photic driving 
was seen; photoparoxysmal responses were absent.     IMPRESSION: Normal Awake 
and Drowsy EEG   CLINICAL CORRELATION: An EEGwithout epileptiform discharges 
does not exclude the possibility of epilepsy.  If the clinical suspicion of 
epilepsy remains, consider additional EEG recordings.      Paulo Nino MD 
NeurophysiologyFellow I have reviewed the electroencephalogram and this report 
and agree with its interpretation.  Doni Ortiz MD Neurophysiology Attending
       Electronically signed by: DONI ORTIZ MD on 10/01/2019 06:24 PMPOCT-
GLUCOSE METER2019-10-01 18:04:00





             Test Item    Value        Reference Range Interpretation Comments

 

             POC-GLUCOSE METER 111 mg/dL           H            TESTED AT 

[place] [address]



             ([facility]) (test code =                                        [facility]



             [address])                                               [id number]



POCT-GLUCOSE [id number] 14:11:00





             Test Item    Value        Reference Range Interpretation Comments

 

             POC-GLUCOSE METER 113 mg/dL           H            TESTED AT 

[place] [address]



             ([facility]) (test code =                                        [facility]



             1538)                                               [id number]



POCT-GLUCOSE [id number] 08:00:00





             Test Item    Value        Reference Range Interpretation Comments

 

             POC-GLUCOSE METER 107 mg/dL                        TESTED AT 

[place] [address]



             ([facility]) (test code =                                        [facility]



             1538)                                               [address]



BASIC METABOLIC PANEL2019-10-01 07:50:00





             Test Item    Value        Reference Range Interpretation Comments

 

             SODIUM (BEAKER) 137 meq/L    136-145                   



             (test code = 381)                                        

 

             POTASSIUM (BEAKER) 3.8 meq/L    3.5-5.1                   



             (test code = 379)                                        

 

             CHLORIDE (BEAKER) 104 meq/L                        



             (test code = 382)                                        

 

             CO2 (BEAKER) (test 25 meq/L     22-29                     



             code = 355)                                         

 

             BLOOD UREA NITROGEN 34 mg/dL     7-21         H            



             (BEAKER) (test code                                        



             = 354)                                              

 

             CREATININE (BEAKER) 1.90 mg/dL   0.57-1.25    H            



             (test code = 358)                                        

 

             GLUCOSE RANDOM 104 mg/dL                        



             (BEAKER) (test code                                        



             = 652)                                              

 

             CALCIUM (BEAKER) 9.2 mg/dL    8.4-10.2                  



             (test code = 697)                                        

 

             EGFR (BEAKER) (test 41 mL/min/1.73                           ESTIMA

YESSICA GFR IS



             code = 1092) sq m                                   NOT AS ACCURATE

 AS



                                                                 CREATININE



                                                                 CLEARANCE IN



                                                                 PREDICTING



                                                                 GLOMERULAR



                                                                 FILTRATION RATE

.



                                                                 ESTIMATED GFR I

S



                                                                 NOT APPLICABLE 

FOR



                                                                 DIALYSIS PATIEN

TS.



CBC (HEMOGRAM ONLY)2019-10-01 07:24:00





             Test Item    Value        Reference Range Interpretation Comments

 

             WHITE BLOOD CELL COUNT (BEAKER) 10.0 K/ L    3.5-10.5              

    



             (test code = 775)                                        

 

             RED BLOOD CELL COUNT (BEAKER) 3.68 M/ L    4.63-6.08    L          

  



             (test code = 761)                                        

 

             HEMOGLOBIN (BEAKER) (test code = 11.3 GM/DL   13.7-17.5    L       

     



             410)                                                

 

             HEMATOCRIT (BEAKER) (test code = 33.6 %       40.1-51.0    L       

     



             411)                                                

 

             MEAN CORPUSCULAR VOLUME (BEAKER) 91.3 fL      79.0-92.2            

     



             (test code = 753)                                        

 

             MEAN CORPUSCULAR HEMOGLOBIN 30.7 pg      25.7-32.2                 



             (BEAKER) (test code = 751)                                        

 

             MEAN CORPUSCULAR HEMOGLOBIN CONC 33.6 GM/DL   32.3-36.5            

     



             (BEAKER) (test code = 752)                                        

 

             RED CELL DISTRIBUTION WIDTH 13.7 %       11.6-14.4                 



             (BEAKER) (test code = 412)                                        

 

             PLATELET COUNT (BEAKER) (test 198 K/CU MM  150-450                 

  



             code = 756)                                         

 

             MEAN PLATELET VOLUME (BEAKER) 10.6 fL      9.4-12.4                

  



             (test code = 754)                                        

 

             NUCLEATED RED BLOOD CELLS 0 /100 WBC   0-0                       



             (BEAKER) (test code = 413)                                        



POCT-GLUCOSE [id number] 21:38:00





             Test Item    Value        Reference Range Interpretation Comments

 

             POC-GLUCOSE METER 110 mg/dL                        TESTED AT 

[facility] [address]



             ([facility]) (test code =                                        [place]



             1538)                                               [address]



POCT-GLUCOSE [id number] 17:14:00





             Test Item    Value        Reference Range Interpretation Comments

 

             POC-GLUCOSE METER 146 mg/dL           H            TESTED AT 

[place] [address]



             ([facility]) (test code =                                        [place] [facility]



             1538)                                               [address]



POCT-GLUCOSE [id number] 12:23:00





             Test Item    Value        Reference Range Interpretation Comments

 

             POC-GLUCOSE METER 139 mg/dL           H            TESTED AT 

[place] [address]



             ([facility]) (test code =                                        [place] [facility]



             1538)                                               [address]



POCT-GLUCOSE [id number] 10:11:00





             Test Item    Value        Reference Range Interpretation Comments

 

             POC-GLUCOSE METER 118 mg/dL           H            TESTED AT 

[place] [address]



             ([facility]) (test code =                                        [place] [facility]



             1538)                                               [address]



POCT-GLUCOSE [id number] 20:41:00





             Test Item    Value        Reference Range Interpretation Comments

 

             POC-GLUCOSE METER 118 mg/dL           H            TESTED AT 

[place] [address]



             ([facility]) (test code =                                        [place] [facility]



             1538)                                               [address]



POCT-GLUCOSE [id number] 18:31:00





             Test Item    Value        Reference Range Interpretation Comments

 

             POC-GLUCOSE METER 110 mg/dL                        TESTED AT 

[place] [address]



             ([facility]) (test code =                                        [place] [facility]



             1538)                                               [address]



POCT-GLUCOSE [id number] 14:25:00





             Test Item    Value        Reference Range Interpretation Comments

 

             POC-GLUCOSE METER 101 mg/dL                        TESTED AT 

[place] [address]



             ([facility]) (test code =                                        [place] [facility]



             1538)                                               [address]



[id number] 12:15:00





             Test Item    Value        Reference Range Interpretation Comments

 

             RPR SCREEN ([facility]) (test code = Nonreactive  Nonreactive          

     



             420)                                                



POCT-GLUCOSE [id number] 10:11:00





             Test Item    Value        Reference Range Interpretation Comments

 

             POC-GLUCOSE METER 98 mg/dL                         TESTED AT 

[place] [address]



             ([facility] [address]



             1538)                                               



BASIC METABOLIC [id number] 06:57:00





             Test Item    Value        Reference Range Interpretation Comments

 

             SODIUM ([facility]) 138 meq/L    136-145                   



             (test code = 381)                                        

 

             POTASSIUM ([facility]) 4.2 meq/L    3.5-5.1                   



             (test code = 379)                                        

 

             CHLORIDE (BEAKER) 104 meq/L                        



             (test code = 382)                                        

 

             CO2 (BEAKER) (test 25 meq/L     22-29                     



             code = 355)                                         

 

             BLOOD UREA NITROGEN 24 mg/dL     7-21         H            



             (BEAKER) (test code                                        



             = 354)                                              

 

             CREATININE (BEAKER) 1.82 mg/dL   0.57-1.25    H            



             (test code = 358)                                        

 

             GLUCOSE RANDOM 104 mg/dL                        



             (BEAKER) (test code                                        



             = 652)                                              

 

             CALCIUM (BEAKER) 9.9 mg/dL    8.4-10.2                  



             (test code = 697)                                        

 

             EGFR (BEAKER) (test 43 mL/min/1.73                           ESTIMA

YESSICA GFR IS



             code = 1092) sq m                                   NOT AS ACCURATE

 AS



                                                                 CREATININE



                                                                 CLEARANCE IN



                                                                 PREDICTING



                                                                 GLOMERULAR



                                                                 FILTRATION RATE

.



                                                                 ESTIMATED GFR I

S



                                                                 NOT APPLICABLE 

FOR



                                                                 DIALYSIS PATIEN

TS.



CBC (HEMOGRAM ONLY)2019 06:35:00





             Test Item    Value        Reference Range Interpretation Comments

 

             WHITE BLOOD CELL COUNT (BEAKER) 7.9 K/ L     3.5-10.5              

    



             (test code = 775)                                        

 

             RED BLOOD CELL COUNT (BEAKER) 3.92 M/ L    4.63-6.08    L          

  



             (test code = 761)                                        

 

             HEMOGLOBIN (BEAKER) (test code = 11.8 GM/DL   13.7-17.5    L       

     



             410)                                                

 

             HEMATOCRIT (BEAKER) (test code = 36.6 %       40.1-51.0    L       

     



             411)                                                

 

             MEAN CORPUSCULAR VOLUME (BEAKER) 93.4 fL      79.0-92.2    H       

     



             (test code = 753)                                        

 

             MEAN CORPUSCULAR HEMOGLOBIN 30.1 pg      25.7-32.2                 



             (BEAKER) (test code = 751)                                        

 

             MEAN CORPUSCULAR HEMOGLOBIN CONC 32.2 GM/DL   32.3-36.5    L       

     



             (BEAKER) (test code = 752)                                        

 

             RED CELL DISTRIBUTION WIDTH 13.6 %       11.6-14.4                 



             (BEAKER) (test code = 412)                                        

 

             PLATELET COUNT (BEAKER) (test 214 K/CU MM  150-450                 

  



             code = 756)                                         

 

             MEAN PLATELET VOLUME (BEAKER) 10.6 fL      9.4-12.4                

  



             (test code = 754)                                        

 

             NUCLEATED RED BLOOD CELLS 0 /100 WBC   0-0                       



             (BEAKER) (test code = 413)                                        



POCT-GLUCOSE [id number] 21:33:00





             Test Item    Value        Reference Range Interpretation Comments

 

             POC-GLUCOSE METER 91 mg/dL                         TESTED AT 

[facility] [address]



             (BEAKER) (test code =                                        [facility]

[place] [facility] [address]



             1538)                                               



POCT-GLUCOSE [id number] 17:12:00





             Test Item    Value        Reference Range Interpretation Comments

 

             POC-GLUCOSE METER 128 mg/dL           H            TESTED AT 

[place] [address]



             (BEAKER) (test code =                                        [facility]



             1538)                                               [address]



POCT-GLUCOSE [id number] 17:08:00





             Test Item    Value        Reference Range Interpretation Comments

 

             POC-GLUCOSE METER 115 mg/dL           H            TESTED AT 

[place] [address]



             [facility] ([facility]                                        [facility]



             1538)                                               [address]



BASIC METABOLIC [id number] 08:50:00





             Test Item    Value        Reference Range Interpretation Comments

 

             SODIUM (BEAKER) 136 meq/L    136-145                   



             (test code = 381)                                        

 

             POTASSIUM (BEAKER) 4.3 meq/L    3.5-5.1                   Specimen 

slightly



             (test code = 379)                                        hemolyzed

 

             CHLORIDE (BEAKER) 103 meq/L                        



             (test code = 382)                                        

 

             CO2 (BEAKER) (test 24 meq/L     22-29                     



             code = 355)                                         

 

             BLOOD UREA NITROGEN 21 mg/dL     7-21                      



             (BEAKER) (test code                                        



             = 354)                                              

 

             CREATININE (BEAKER) 1.64 mg/dL   0.57-1.25    H            Specimen

 slightly



             (test code = 358)                                        hemolyzed

 

             GLUCOSE RANDOM 113 mg/dL           H            



             (BEAKER) (test code                                        



             = 652)                                              

 

             CALCIUM (BEAKER) 9.7 mg/dL    8.4-10.2                  



             (test code = 697)                                        

 

             EGFR (BEAKER) (test 49 mL/min/1.73                           ESTIMA

YESSICA GFR IS



             code = 1092) sq m                                   NOT AS ACCURATE

 AS



                                                                 CREATININE



                                                                 CLEARANCE IN



                                                                 PREDICTING



                                                                 GLOMERULAR



                                                                 FILTRATION RATE

.



                                                                 ESTIMATED GFR I

S



                                                                 NOT APPLICABLE 

FOR



                                                                 DIALYSIS PATIEN

TS.



CBC (HEMOGRAM ONLY)2019 08:36:00





             Test Item    Value        Reference Range Interpretation Comments

 

             WHITE BLOOD CELL COUNT (BEAKER) 9.0 K/ L     3.5-10.5              

    



             (test code = 775)                                        

 

             RED BLOOD CELL COUNT (BEAKER) 4.20 M/ L    4.63-6.08    L          

  



             (test code = 761)                                        

 

             HEMOGLOBIN (BEAKER) (test code = 12.6 GM/DL   13.7-17.5    L       

     



             410)                                                

 

             HEMATOCRIT (BEAKER) (test code = 39.2 %       40.1-51.0    L       

     



             411)                                                

 

             MEAN CORPUSCULAR VOLUME (BEAKER) 93.3 fL      79.0-92.2    H       

     



             (test code = 753)                                        

 

             MEAN CORPUSCULAR HEMOGLOBIN 30.0 pg      25.7-32.2                 



             (BEAKER) (test code = 751)                                        

 

             MEAN CORPUSCULAR HEMOGLOBIN CONC 32.1 GM/DL   32.3-36.5    L       

     



             (BEAKER) (test code = 752)                                        

 

             RED CELL DISTRIBUTION WIDTH 13.7 %       11.6-14.4                 



             (BEAKER) (test code = 412)                                        

 

             PLATELET COUNT (BEAKER) (test 214 K/CU MM  150-450                 

  



             code = 756)                                         

 

             MEAN PLATELET VOLUME (BEAKER) 10.1 fL      9.4-12.4                

  



             (test code = 754)                                        

 

             NUCLEATED RED BLOOD CELLS 0 /100 WBC   0-0                       



             (BEAKER) (test code = 413)                                        



MR, BRAIN, WITHOUT [id number] 06:09:00Reason for exam:-
&gt;CEREBROVASCULAR ACCIDENTFINAL REPORT PATIENT ID:   [id number] MRI Brain 
without contrast CLINICAL HISTORY: Dementia, vascular suspectedCEREBROVASCULAR 
ACCIDENT Technique: MRI of the brain utilizing axial T2, FLAIR, GRE, DWI; sag
ittal and coronal T1-weighted images. Comparisons: CT head 2019 
Findings: There is no evidence of acute infarct or hemorrhage. There is no 
midline shift. There are no extra-axial fluid collections. The craniocervical 
junction is preserved. Punctate chronic microhemorrhage involving the left 
cerebellar hemisphere and posterior left temporal lobe. Moderate parenchymal 
volume loss with temporal lobe predominance. Lateral ventriculomegaly present. 
Confluent cerebral white matter T2 FLAIR hyperintensities. Chronic lacunar 
infarcts of the basal ganglia and left cerebellar hemisphere. Chronic ischemic 
changes of the thalami also noted. The orbits and globes are unremarkable. Mild 
paranasal sinus mucosal thickening. Diminutive right vertebral artery, and 
hypoplastic V4 segment flow void, likely hypoplastic. IMPRESSION: No acute 
infarct. Moderate chronic microangiopathic ischemic changes. Lateral 
ventriculomegaly likely related to moderate parenchymal atrophy. However, a 
component of normal pressure hydrocephalus is not excluded by imaging 
appearance. Small foci of chronic microhemorrhageinvolving the cerebellum and 
left temporal lobe. Signed: Po Yen MDReport Verified Date/Time:  
2019 06:09:32     Electronically signed by: PO YEN MD on 
2019 06:09 AMTSH/FREE T4 IF [id number] 19:42:00





             Test Item    Value        Reference Range Interpretation Comments

 

             THYROID STIMULATING HORMONE 1.98 uIU/mL  0.35-4.94                 



             (BEAKER) (test code = 772)                                        



VITAMIN B12 AND [id number] 19:42:00





             Test Item    Value        Reference Range Interpretation Comments

 

             VITAMIN B12 (BEAKER) (test code = 243 pg/mL    213-816             

      



             774)                                                

 

             FOLATE (BEAKER) (test code = 362) 9.3 ng/mL    >=7.0               

      



URINALYSIS W/ REFLEX URINE [id number] 19:07:00





             Test Item    Value        Reference Range Interpretation Comments

 

             COLOR (BEAKER) (test code = 470) Yellow                            

     

 

             CLARITY (BEAKER) (test code = 469) Clear                           

       

 

             SPECIFIC GRAVITY UA (BEAKER) (test 1.016        1.001-1.035        

       



             code = 468)                                         

 

             PH UA (BEAKER) (test code = 467) 6.5          5.0-8.0              

     

 

             PROTEIN UA (BEAKER) (test code = 50 mg/dL     Negative     A       

     



             464)                                                

 

             GLUCOSE UA (BEAKER) (test code = Negative     Negative             

     



             365)                                                

 

             KETONES UA (BEAKER) (test code = Negative     Negative             

     



             371)                                                

 

             BILIRUBIN UA (BEAKER) (test code = Negative     Negative           

       



             462)                                                

 

             BLOOD UA (BEAKER) (test code = 461) Negative     Negative          

        

 

             NITRITE UA (BEAKER) (test code = Negative     Negative             

     



             465)                                                

 

             LEUKOCYTE ESTERASE UA (BEAKER) Negative     Negative               

   



             (test code = 466)                                        

 

             UROBILINOGEN UA (BEAKER) (test code 6.0 mg/dL    0.2-1.0      H    

        



             = 463)                                              

 

             RBC UA (BEAKER) (test code = 519) 0 /HPF                           

      

 

             WBC UA (BEAKER) (test code = 520) 1 /HPF                           

      

 

             SQUAMOUS EPITHELIAL (BEAKER) (test 1 /HPF                          

       



             code = 516)                                         

 

             HYALINE CASTS (BEAKER) (test code = 2 /LPF                         

        



             514)                                                

 

             SOURCE(BEAKER) (test code = 2795)                                  

      



POCT-GLUCOSE [id number] 19:04:00





             Test Item    Value        Reference Range Interpretation Comments

 

             POC-GLUCOSE METER 100 mg/dL                        TESTED AT 

[facility] [address]



             ([facility]) (test code =                                        [place]



             [address])                                               [id number]



[id number] 14:38:00





             Test Item    Value        Reference Range Interpretation Comments

 

             PROTIME (BEAKER) (test code = 13.5 seconds 11.9-14.2               

  



             759)                                                

 

             INR (BEAKER) (test code = 370) 1.1          <=5.9                  

   

 

             PARTIAL THROMBOPLASTIN TIME 24.1 seconds 22.5-36.0                 



             (BEAKER) (test code = 760)                                        



Effective 2019: PT Reference Range ChangeNew: 11.9-14.2  Previous: 11.7-
14.7RECOMMENDED COUMADIN/WARFARIN INR THERAPY RANGESSTANDARD DOSE: 2.0-3.0  
Includes: PROPHYLAXIS for venous thrombosis, systemic embolization; TREATMENT 
for venous thrombosis and/or pulmonary embolus.HIGH RISK: Target INR is2.5-3.5 
for patients wiht mechanical heart valves.CBC W/PLT COUNT &amp; AUTO 
[id number] 14:23:00





             Test Item    Value        Reference Range Interpretation Comments

 

             WHITE BLOOD CELL COUNT (BEAKER) 8.3 K/ L     3.5-10.5              

    



             (test code = 775)                                        

 

             RED BLOOD CELL COUNT (BEAKER) 3.73 M/ L    4.63-6.08    L          

  



             (test code = 761)                                        

 

             HEMOGLOBIN (BEAKER) (test code = 11.3 GM/DL   13.7-17.5    L       

     



             410)                                                

 

             HEMATOCRIT (BEAKER) (test code = 34.5 %       40.1-51.0    L       

     



             411)                                                

 

             MEAN CORPUSCULAR VOLUME (BEAKER) 92.5 fL      79.0-92.2    H       

     



             (test code = 753)                                        

 

             MEAN CORPUSCULAR HEMOGLOBIN 30.3 pg      25.7-32.2                 



             (BEAKER) (test code = 751)                                        

 

             MEAN CORPUSCULAR HEMOGLOBIN CONC 32.8 GM/DL   32.3-36.5            

     



             (BEAKER) (test code = 752)                                        

 

             RED CELL DISTRIBUTION WIDTH 13.6 %       11.6-14.4                 



             (BEAKER) (test code = 412)                                        

 

             PLATELET COUNT (BEAKER) (test 192 K/CU MM  150-450                 

  



             code = 756)                                         

 

             MEAN PLATELET VOLUME (BEAKER) 10.4 fL      9.4-12.4                

  



             (test code = 754)                                        

 

             NUCLEATED RED BLOOD CELLS 0 /100 WBC   0-0                       



             (BEAKER) (test code = 413)                                        

 

             NEUTROPHILS RELATIVE PERCENT 43 %                                  

 



             (BEAKER) (test code = 429)                                        

 

             LYMPHOCYTES RELATIVE PERCENT 46 %                                  

 



             (BEAKER) (test code = 430)                                        

 

             MONOCYTES RELATIVE PERCENT 9 %                                    



             (BEAKER) (test code = 431)                                        

 

             EOSINOPHILS RELATIVE PERCENT 1 %                                   

 



             (BEAKER) (test code = 432)                                        

 

             BASOPHILS RELATIVE PERCENT 1 %                                    



             (BEAKER) (test code = 437)                                        

 

             NEUTROPHILS ABSOLUTE COUNT 3.57 K/ L    1.78-5.38                 



             (BEAKER) (test code = 670)                                        

 

             LYMPHOCYTES ABSOLUTE COUNT 3.84 K/ L    1.32-3.57    H            



             (BEAKER) (test code = 414)                                        

 

             MONOCYTES ABSOLUTE COUNT (BEAKER) 0.73 K/ L    0.30-0.82           

      



             (test code = 415)                                        

 

             EOSINOPHILS ABSOLUTE COUNT 0.10 K/ L    0.04-0.54                 



             (BEAKER) (test code = 416)                                        

 

             BASOPHILS ABSOLUTE COUNT (BEAKER) 0.04 K/ L    0.01-0.08           

      



             (test code = 417)                                        

 

             IMMATURE GRANULOCYTES-RELATIVE 0 %          0-1                    

   



             PERCENT (BEAKER) (test code =                                      

  



             2801)                                               



TROPONIN [id number] 14:13:00





             Test Item    Value        Reference Range Interpretation Comments

 

             TROPONIN I (BEAKER) (test code = 0.02 ng/mL   0.00-0.03            

     



             397)                                                








             Test Item    Value        Reference Range Interpretation Comments

 

             MAGNESIUM (BEAKER) (test code = 2.0 mg/dL    1.6-2.6               

    



             627)                                                



BASIC METABOLIC [id number] 14:07:00





             Test Item    Value        Reference Range Interpretation Comments

 

             SODIUM (BEAKER) 140 meq/L    136-145                   



             (test code = 381)                                        

 

             POTASSIUM (BEAKER) 4.2 meq/L    3.5-5.1                   



             (test code = 379)                                        

 

             CHLORIDE (BEAKER) 106 meq/L                        



             (test code = 382)                                        

 

             CO2 (BEAKER) (test 29 meq/L     22-29                     



             code = 355)                                         

 

             BLOOD UREA NITROGEN 23 mg/dL     7-21         H            



             (BEAKER) (test code                                        



             = 354)                                              

 

             CREATININE (BEAKER) 1.79 mg/dL   0.57-1.25    H            



             (test code = 358)                                        

 

             GLUCOSE RANDOM 121 mg/dL           H            



             (BEAKER) (test code                                        



             = 652)                                              

 

             CALCIUM (BEAKER) 10.0 mg/dL   8.4-10.2                  



             (test code = 697)                                        

 

             EGFR (BEAKER) (test 44 mL/min/1.73                           ESTIMA

YESSICA GFR IS



             code = 1092) sq m                                   NOT AS ACCURATE

 AS



                                                                 CREATININE



                                                                 CLEARANCE IN



                                                                 PREDICTING



                                                                 GLOMERULAR



                                                                 FILTRATION RATE

.



                                                                 ESTIMATED GFR I

S



                                                                 NOT APPLICABLE 

FOR



                                                                 DIALYSIS PATIEN

TS.



RAD, CHEST, 1 VIEW, NON [id number] 14:01:00Reason for exam:-&gt;strokeFINAL 
REPORT PATIENT ID:   [id number] Clinical History: stroke Comparison Study: None 
Findings:  The heart and lungs are within normal limits.  The pleural spaces are
clear.  There is no pneumothorax. Degenerative changes are seen. Impression: No 
active cardiopulmonary disease. Signed: Jet [place] Verified 
Date/Time:  2019 14:01:41 Reading Location: [facility] Radiology 
Reading Room      Electronically signed by: JET NEWTON M.D. on 2019 
02:[facility] [id number] 13:28:00Reason for exam:-&gt;AMSWhat
is the patient's sedation requirement?-&gt;No SedationFINAL REPORT PATIENT ID:  
[id number] CT Head without contrast CLINICAL HISTORY: Decreased alertnessAMS 
TECHNIQUE: Contiguous axial images through the head without contrast. This exam 
was performed according to the departmental dose optimization program which 
includes automated exposure control, adjustment of the mA and/or kV according to
the patient size, and/or use of an iterative reconstruction technique. 
COMPARISON: None FINDINGS: There is no CT evidence of acute infarct. There is no
intracranial hemorrhage. There is nonspecific white matter disease. There is 
generalized parenchymal volume loss with slightly disproportionate 
ventriculomegaly. There is no midline shift. There are atherosclerotic c
alcifications of the intracranial circulation. There are no extra-axial fluid 
collections. The skullis intact. The paranasal sinuses are well-aerated. 
IMPRESSION: There is no CT evidence of acute infarct or intracranial hemorrhage.
Mild ventriculomegaly, for which clinical correlation for communicating 
hydrocephalus is recommended. The findings were discussed with the stroke 
neurologist at 1:30 PM. Signed: Alyssa Verma MDReport Verified Date/Time:  
2019 13:28:27 Reading Location: [facility] C013V Neuro Reading Room      
Electronically signed by: ALYSSA VERMA M.D. on 2019 01:28 PMTISSUE EXAM
2019 17:52:00Surgical Pathology Report                         Case: [id number]
[id number]                                 Authorizing Provider:  Lorenzo Goldman,   Collected:           2019 0829                 MD               
                                                         OrderingLocation:     
[facility]             Received:            2019 0938           
PERIOPERATIVE SERVICES                                                     
Pathologist:        Tank Marino MD                                         
              Specimen:    Plaque, right carotid plaque                         
                               ARTERY, RIGHT CAROTID, ENDARTERECTOMY:CALCIFIC 
ATHEROSCLEROTIC PLAQUE      Signing Pathologist Direct Phone Line: [phone]
[phone] signed by Tank Marino MD on 2019 at  5:52 [id number]; 
[id number] carotid plaque The specimen is received in formalin and consists of a
portion of fibrotic, calcified, vasculartissue measuring 3.5 x 1.1 x 0.5 cm. 
Representative sections are submitted in one cassette for decalcification. 
CB/ewPerformedPOCT-GLUCOSE [id number] 17:51:00





             Test Item    Value        Reference Range Interpretation Comments

 

             POC-GLUCOSE METER 129 mg/dL           H            TESTED AT 

[facility] [address]



             [facility] (test code =                                        [facility]

[facility]



             1538)                                               [id number]



POCT-GLUCOSE [id number] 08:26:00





             Test Item    Value        Reference Range Interpretation Comments

 

             POC-GLUCOSE METER 136 mg/dL           H            TESTED AT 

[facility] [address]



             ([facility] (test code =                                        [place]

[facility]



             1538)                                               [address]



[id number] 06:14:00





             Test Item    Value        Reference Range Interpretation Comments

 

             MAGNESIUM ([employer]) (test code = 2.0 mg/dL    1.6-2.6               

    



             627)                                                



BASIC METABOLIC [id number] 06:14:00





             Test Item    Value        Reference Range Interpretation Comments

 

             SODIUM (BEAKER) 132 meq/L    136-145      L            



             (test code = 381)                                        

 

             POTASSIUM (BEAKER) 3.9 meq/L    3.5-5.1                   



             (test code = 379)                                        

 

             CHLORIDE (BEAKER) 99 meq/L                         



             (test code = 382)                                        

 

             CO2 (BEAKER) (test 24 meq/L     22-29                     



             code = 355)                                         

 

             BLOOD UREA NITROGEN 34 mg/dL     7-21         H            



             (BEAKER) (test code                                        



             = 354)                                              

 

             CREATININE (BEAKER) 1.81 mg/dL   0.57-1.25    H            



             (test code = 358)                                        

 

             GLUCOSE RANDOM 138 mg/dL           H            



             (BEAKER) (test code                                        



             = 652)                                              

 

             CALCIUM (BEAKER) 10.1 mg/dL   8.4-10.2                  



             (test code = 697)                                        

 

             EGFR (BEAKER) (test 43 mL/min/1.73                           ESTIMA

YESSICA GFR IS



             code = 1092) sq m                                   NOT AS ACCURATE

 AS



                                                                 CREATININE



                                                                 CLEARANCE IN



                                                                 PREDICTING



                                                                 GLOMERULAR



                                                                 FILTRATION RATE

.



                                                                 ESTIMATED GFR I

S



                                                                 NOT APPLICABLE 

FOR



                                                                 DIALYSIS PATIEN

TS.



CBC (HEMOGRAM ONLY)2019 05:58:00





             Test Item    Value        Reference Range Interpretation Comments

 

             WHITE BLOOD CELL COUNT (BEAKER) 12.8 K/ L    3.5-10.5     H        

    



             (test code = 775)                                        

 

             RED BLOOD CELL COUNT (BEAKER) 3.73 M/ L    4.63-6.08    L          

  



             (test code = 761)                                        

 

             HEMOGLOBIN (BEAKER) (test code = 11.1 GM/DL   13.7-17.5    L       

     



             410)                                                

 

             HEMATOCRIT (BEAKER) (test code = 33.9 %       40.1-51.0    L       

     



             411)                                                

 

             MEAN CORPUSCULAR VOLUME (BEAKER) 90.9 fL      79.0-92.2            

     



             (test code = 753)                                        

 

             MEAN CORPUSCULAR HEMOGLOBIN 29.8 pg      25.7-32.2                 



             (BEAKER) (test code = 751)                                        

 

             MEAN CORPUSCULAR HEMOGLOBIN CONC 32.7 GM/DL   32.3-36.5            

     



             (BEAKER) (test code = 752)                                        

 

             RED CELL DISTRIBUTION WIDTH 14.0 %       11.6-14.4                 



             (BEAKER) (test code = 412)                                        

 

             PLATELET COUNT (BEAKER) (test 160 K/CU MM  150-450                 

  



             code = 756)                                         

 

             MEAN PLATELET VOLUME (BEAKER) 10.3 fL      9.4-12.4                

  



             (test code = 754)                                        

 

             NUCLEATED RED BLOOD CELLS 0 /100 WBC   0-0                       



             (BEAKER) (test code = 413)                                        



POCT-GLUCOSE [id number] 18:44:00





             Test Item    Value        Reference Range Interpretation Comments

 

             POC-GLUCOSE METER 148 mg/dL           H            TESTED AT 

[place] [address]



             (BEAKER) (test code =                                        [facility]



             1538)                                               [address]



POCT-GLUCOSE [id number] 14:58:00





             Test Item    Value        Reference Range Interpretation Comments

 

             POC-GLUCOSE METER 138 mg/dL           H            TESTED AT 

[place] [address]



             ([facility]) (test code =                                        [facility]



             1538)                                               [address]



HEMOGLOBIN [id number] 11:52:00





             Test Item    Value        Reference Range Interpretation Comments

 

             HEMOGLOBIN A1C (BEAKER) (test code = 6.4 %        4.3-6.1      H   

         



             368)                                                



POCT-GLUCOSE [id number] 08:45:00





             Test Item    Value        Reference Range Interpretation Comments

 

             POC-GLUCOSE METER 144 mg/dL           H            TESTED AT 

[place] [address]



             [facility] (test code =                                        [facility]



             1538)                                               [address]



[id number] 04:50:00





             Test Item    Value        Reference Range Interpretation Comments

 

             PHOSPHORUS (BEAKER) (test code = 5.2 mg/dL    2.3-4.7      H       

     



             604)                                                



[id number] 04:50:00





             Test Item    Value        Reference Range Interpretation Comments

 

             MAGNESIUM (BEAKER) (test code = 2.0 mg/dL    1.6-2.6               

    



             627)                                                



BASIC METABOLIC [id number] 04:50:00





             Test Item    Value        Reference Range Interpretation Comments

 

             SODIUM (BEAKER) 136 meq/L    136-145                   



             (test code = 381)                                        

 

             POTASSIUM (BEAKER) 4.7 meq/L    3.5-5.1                   



             (test code = 379)                                        

 

             CHLORIDE (BEAKER) 104 meq/L                        



             (test code = 382)                                        

 

             CO2 (BEAKER) (test 24 meq/L     22-29                     



             code = 355)                                         

 

             BLOOD UREA NITROGEN 36 mg/dL     7-21         H            



             (BEAKER) (test code                                        



             = 354)                                              

 

             CREATININE (BEAKER) 1.98 mg/dL   0.57-1.25    H            



             (test code = 358)                                        

 

             GLUCOSE RANDOM 145 mg/dL           H            



             (BEAKER) (test code                                        



             = 652)                                              

 

             CALCIUM (BEAKER) 9.5 mg/dL    8.4-10.2                  



             (test code = 697)                                        

 

             EGFR (BEAKER) (test 39 mL/min/1.73                           ESTIMA

YESSICA GFR IS



             code = 1092) sq m                                   NOT AS ACCURATE

 AS



                                                                 CREATININE



                                                                 CLEARANCE IN



                                                                 PREDICTING



                                                                 GLOMERULAR



                                                                 FILTRATION RATE

.



                                                                 ESTIMATED GFR I

S



                                                                 NOT APPLICABLE 

FOR



                                                                 DIALYSIS PATIEN

TS.



CBC (HEMOGRAM ONLY)2019 04:29:00





             Test Item    Value        Reference Range Interpretation Comments

 

             WHITE BLOOD CELL COUNT (BEAKER) 12.4 K/ L    3.5-10.5     H        

    



             (test code = 775)                                        

 

             RED BLOOD CELL COUNT (BEAKER) 3.90 M/ L    4.63-6.08    L          

  



             (test code = 761)                                        

 

             HEMOGLOBIN (BEAKER) (test code = 11.6 GM/DL   13.7-17.5    L       

     



             410)                                                

 

             HEMATOCRIT (BEAKER) (test code = 35.8 %       40.1-51.0    L       

     



             411)                                                

 

             MEAN CORPUSCULAR VOLUME (BEAKER) 91.8 fL      79.0-92.2            

     



             (test code = 753)                                        

 

             MEAN CORPUSCULAR HEMOGLOBIN 29.7 pg      25.7-32.2                 



             (BEAKER) (test code = 751)                                        

 

             MEAN CORPUSCULAR HEMOGLOBIN CONC 32.4 GM/DL   32.3-36.5            

     



             (BEAKER) (test code = 752)                                        

 

             RED CELL DISTRIBUTION WIDTH 14.4 %       11.6-14.4                 



             (BEAKER) (test code = 412)                                        

 

             PLATELET COUNT (BEAKER) (test 187 K/CU MM  150-450                 

  



             code = 756)                                         

 

             MEAN PLATELET VOLUME (BEAKER) 9.6 fL       9.4-12.4                

  



             (test code = 754)                                        

 

             NUCLEATED RED BLOOD CELLS 0 /100 WBC   0-0                       



             (BEAKER) (test code = 413)                                        



BLOOD GAS, [id number] 04:27:00





             Test Item    Value        Reference Range Interpretation Comments

 

             PH ARTERIAL (BEAKER) (test code = 7.36         7.35-7.45           

      



             383)                                                

 

             PCO2 ARTERIAL (BEAKER) (test code 45 mmHg      35-45               

      



             = 384)                                              

 

             PO2 ARTERIAL (BEAKER) (test code 120 mmHg     80-90        H       

     



             = 385)                                              

 

             O2 SATURATION ARTERIAL (BEAKER) 98.3 %       96.0-97.0    H        

    



             (test code = 386)                                        

 

             HCO3 ARTERIAL (BEAKER) (test code 25 mmol/L    21-29               

      



             = 388)                                              

 

             BASE EXCESS ARTERIAL (BEAKER) -0.9 mmol/L  -2.0-3.0                

  



             (test code = 387)                                        

 

             PATIENT TEMPERATURE (BEAKER) 36.6 C                                

 



             (test code = 1818)                                        

 

             FIO2 (BEAKER) (test code = 1819) 28.0 %                            

     



CALCIUM, [id number] 04:26:00





             Test Item    Value        Reference Range Interpretation Comments

 

             CALCIUM IONIZED (BEAKER) (test 1.26 mmol/L  1.12-1.27              

   



             code = 698)                                         

 

             PH, BLOOD (BEAKER) (test code = 7.35                               

    



             1810)                                               



POCT-GLUCOSE [id number] 00:57:00





             Test Item    Value        Reference Range Interpretation Comments

 

             POC-GLUCOSE METER 110 mg/dL                        TESTED AT 

[facility] [address]



             [facility] (test code =                                        [place]



             [address])                                               [address]



POCT-GLUCOSE [id number] 18:45:00





             Test Item    Value        Reference Range Interpretation Comments

 

             POC-GLUCOSE METER 122 mg/dL           H            TESTED AT 

[facility] [address]



             ([facility] (test code =                                        [place]



             [address])                                               [address]



[id number] 11:33:00





             Test Item    Value        Reference Range Interpretation Comments

 

             PHOSPHORUS (BEAKER) (test code = 3.6 mg/dL    2.3-4.7              

     



             604)                                                



[id number] 11:33:00





             Test Item    Value        Reference Range Interpretation Comments

 

             MAGNESIUM (BEAKER) (test code = 2.0 mg/dL    1.6-2.6               

    



             627)                                                



BASIC METABOLIC [id number] 11:33:00





             Test Item    Value        Reference Range Interpretation Comments

 

             SODIUM (BEAKER) 137 meq/L    136-145                   



             (test code = 381)                                        

 

             POTASSIUM (BEAKER) 4.1 meq/L    3.5-5.1                   



             (test code = 379)                                        

 

             CHLORIDE (BEAKER) 105 meq/L                        



             (test code = 382)                                        

 

             CO2 (BEAKER) (test 26 meq/L     22-29                     



             code = 355)                                         

 

             BLOOD UREA NITROGEN 33 mg/dL     7-21         H            



             (BEAKER) (test code                                        



             = 354)                                              

 

             CREATININE (BEAKER) 1.81 mg/dL   0.57-1.25    H            



             (test code = 358)                                        

 

             GLUCOSE RANDOM 148 mg/dL           H            



             (BEAKER) (test code                                        



             = 652)                                              

 

             CALCIUM (BEAKER) 9.5 mg/dL    8.4-10.2                  



             (test code = 697)                                        

 

             EGFR (BEAKER) (test 43 mL/min/1.73                           ESTIMA

YESSICA GFR IS



             code = 1092) sq m                                   NOT AS ACCURATE

 AS



                                                                 CREATININE



                                                                 CLEARANCE IN



                                                                 PREDICTING



                                                                 GLOMERULAR



                                                                 FILTRATION RATE

.



                                                                 ESTIMATED GFR I

S



                                                                 NOT APPLICABLE 

FOR



                                                                 DIALYSIS PATIEN

TS.



HEPATIC FUNCTION [id number] 11:33:00





             Test Item    Value        Reference Range Interpretation Comments

 

             TOTAL PROTEIN (BEAKER) (test code = 7.7 gm/dL    6.0-8.3           

        



             770)                                                

 

             ALBUMIN (BEAKER) (test code = 1145) 4.1 g/dL     3.5-5.0           

        

 

             BILIRUBIN TOTAL (BEAKER) (test code 1.1 mg/dL    0.2-1.2           

        



             = 377)                                              

 

             BILIRUBIN DIRECT (BEAKER) (test 0.5 mg/dL    0.1-0.5               

    



             code = 706)                                         

 

             ALKALINE PHOSPHATASE (BEAKER) (test 76 U/L                   

        



             code = 346)                                         

 

             AST (SGOT) (BEAKER) (test code = 18 U/L       5-34                 

     



             353)                                                

 

             ALT (SGPT) (BEAKER) (test code = 13 U/L       6-55                 

     



             347)                                                



[id number] 11:03:00





             Test Item    Value        Reference Range Interpretation Comments

 

             PROTIME (BEAKER) (test code = 14.7 seconds 11.9-14.2    H          

  



             759)                                                

 

             INR (BEAKER) (test code = 370) 1.2          <=5.9                  

   

 

             PARTIAL THROMBOPLASTIN TIME 29.7 seconds 22.5-36.0                 



             (BEAKER) (test code = 760)                                        



Effective 2019: PT Reference Range ChangeNew: 11.9-14.2  Previous: 11.7-
14.7RECOMMENDED COUMADIN/WARFARIN INR THERAPY RANGESSTANDARD DOSE: 2.0-3.0  
Includes: PROPHYLAXIS for venous thrombosis, systemic embolization; TREATMENT 
for venous thrombosis and/or pulmonary embolus.HIGH RISK: Target INR is2.5-3.5 
for patients wiht mechanical heart valves.CBC (HEMOGRAM ONLY)2019 10:55:00





             Test Item    Value        Reference Range Interpretation Comments

 

             WHITE BLOOD CELL COUNT (BEAKER) 11.7 K/ L    3.5-10.5     H        

    



             (test code = 775)                                        

 

             RED BLOOD CELL COUNT (BEAKER) 4.01 M/ L    4.63-6.08    L          

  



             (test code = 761)                                        

 

             HEMOGLOBIN (BEAKER) (test code = 12.0 GM/DL   13.7-17.5    L       

     



             410)                                                

 

             HEMATOCRIT (BEAKER) (test code = 36.6 %       40.1-51.0    L       

     



             411)                                                

 

             MEAN CORPUSCULAR VOLUME (BEAKER) 91.3 fL      79.0-92.2            

     



             (test code = 753)                                        

 

             MEAN CORPUSCULAR HEMOGLOBIN 29.9 pg      25.7-32.2                 



             (BEAKER) (test code = 751)                                        

 

             MEAN CORPUSCULAR HEMOGLOBIN CONC 32.8 GM/DL   32.3-36.5            

     



             (BEAKER) (test code = 752)                                        

 

             RED CELL DISTRIBUTION WIDTH 13.9 %       11.6-14.4                 



             (BEAKER) (test code = 412)                                        

 

             PLATELET COUNT (BEAKER) (test 193 K/CU MM  150-450                 

  



             code = 756)                                         

 

             MEAN PLATELET VOLUME (BEAKER) 9.5 fL       9.4-12.4                

  



             (test code = 754)                                        

 

             NUCLEATED RED BLOOD CELLS 0 /100 WBC   0-0                       



             (BEAKER) (test code = 413)                                        



BLOOD GAS, [id number] 10:50:00





             Test Item    Value        Reference Range Interpretation Comments

 

             PH ARTERIAL (BEAKER) (test code = 7.37         7.35-7.45           

      



             383)                                                

 

             PCO2 ARTERIAL (BEAKER) (test code 42 mmHg      35-45               

      



             = 384)                                              

 

             PO2 ARTERIAL (BEAKER) (test code 106 mmHg     80-90        H       

     



             = 385)                                              

 

             O2 SATURATION ARTERIAL (BEAKER) 97.8 %       96.0-97.0    H        

    



             (test code = 386)                                        

 

             HCO3 ARTERIAL (BEAKER) (test code 24 mmol/L    21-29               

      



             = 388)                                              

 

             BASE EXCESS ARTERIAL (BEAKER) -1.7 mmol/L  -2.0-3.0                

  



             (test code = 387)                                        

 

             PATIENT TEMPERATURE (BEAKER) 36.5 C                                

 



             (test code = 1818)                                        

 

             FIO2 (BEAKER) (test code = 1819) 28.0 %                            

     



GLUCOSE-STAT [id number] 10:50:00





             Test Item    Value        Reference Range Interpretation Comments

 

             GLUCOSE RANDOM (BEAKER) (test code 146 mg/dL           H     

       



             = 652)                                              



HGB/HCT (H&amp;H) - STAT [id number] 10:50:00





             Test Item    Value        Reference Range Interpretation Comments

 

             HEMOGLOBIN (BEAKER) (test code = 12.6 g/dL    13.0-16.8    L       

     



             410)                                                

 

             HEMATOCRIT (BEAKER) (test code = 37.0 %       40.0-50.0    L       

     



             411)                                                



CALCIUM, [id number] 10:50:00





             Test Item    Value        Reference Range Interpretation Comments

 

             CALCIUM IONIZED (BEAKER) (test 1.23 mmol/L  1.12-1.27              

   



             code = 698)                                         

 

             PH, BLOOD (BEAKER) (test code = 7.36                               

    



             1810)                                               



SODIUM NA-STAT [id number] 10:49:00





             Test Item    Value        Reference Range Interpretation Comments

 

             SODIUM (BEAKER) (test code = 381) 138 meq/L    135-148             

      



POTASSIUM-STAT [id number] 10:49:00





             Test Item    Value        Reference Range Interpretation Comments

 

             POTASSIUM (BEAKER) (test code = 4.2 meq/L    3.6-5.5               

    



             379)                                                



POCT-GLUCOSE [id number] 06:30:00





             Test Item    Value        Reference Range Interpretation Comments

 

             POC-GLUCOSE METER 125 mg/dL           H            TESTED AT 

[facility] [address]



             (BEAKER) (test code =                                        [place]



             1538)                                               [address]



CBC W/PLT COUNT &amp; AUTO [id number] 15:40:00





             Test Item    Value        Reference Range Interpretation Comments

 

             WHITE BLOOD CELL COUNT (BEAKER) 10.6 K/ L    3.5-10.5     H        

    



             (test code = 775)                                        

 

             RED BLOOD CELL COUNT (BEAKER) 4.18 M/ L    4.63-6.08    L          

  



             (test code = 761)                                        

 

             HEMOGLOBIN (BEAKER) (test code = 12.6 GM/DL   13.7-17.5    L       

     



             410)                                                

 

             HEMATOCRIT (BEAKER) (test code = 38.3 %       40.1-51.0    L       

     



             411)                                                

 

             MEAN CORPUSCULAR VOLUME (BEAKER) 91.6 fL      79.0-92.2            

     



             (test code = 753)                                        

 

             MEAN CORPUSCULAR HEMOGLOBIN 30.1 pg      25.7-32.2                 



             (BEAKER) (test code = 751)                                        

 

             MEAN CORPUSCULAR HEMOGLOBIN CONC 32.9 GM/DL   32.3-36.5            

     



             (BEAKER) (test code = 752)                                        

 

             RED CELL DISTRIBUTION WIDTH 14.0 %       11.6-14.4                 



             (BEAKER) (test code = 412)                                        

 

             PLATELET COUNT (BEAKER) (test 214 K/CU MM  150-450                 

  



             code = 756)                                         

 

             MEAN PLATELET VOLUME (BEAKER) 9.2 fL       9.4-12.4     L          

  



             (test code = 754)                                        

 

             NUCLEATED RED BLOOD CELLS 0 /100 WBC   0-0                       



             (BEAKER) (test code = 413)                                        



(CELLAVISION MANUAL DIFF)2019 15:40:00





             Test Item    Value        Reference Range Interpretation Comments

 

             NEUTROPHILS - REL 30 %                                   



             (CELLAVISION)(BEAKER) (test code                                   

     



             = 2816)                                             

 

             LYMPHOCYTES - REL 58 %                                   



             (CELLAVISION)(BEAKER) (test code                                   

     



             = 2817)                                             

 

             MONOCYTES - REL 4 %                                    



             (CELLAVISION)(BEAKER) (test code                                   

     



             = 2818)                                             

 

             EOSINOPHILS - REL 1 %                                    



             (CELLAVISION)(BEAKER) (test code                                   

     



             = 2819)                                             

 

             BASOPHILS - REL 2 %                                    



             (CELLAVISION)(BEAKER) (test code                                   

     



             = 2820)                                             

 

             ATYPICAL LYMPHOCYTES - REL 5 %          0-0          H            



             (CELLAVISION)(BEAKER) (test code                                   

     



             = 2829)                                             

 

             NEUTROPHILS - ABS 3.18 K/ul    1.78-5.38                 



             (CELLAVISION)(BEAKER) (test code                                   

     



             = 2830)                                             

 

             LYMPHOCYTES - ABS 6.15 K/ul    1.32-3.57    H            



             (CELLAVISION)(BEAKER) (test code                                   

     



             = 2831)                                             

 

             MONOCYTES - ABS 0.42 K/uL    0.30-0.82                 



             (CELLAVISION)(BEAKER) (test code                                   

     



             = 2832)                                             

 

             EOSINOPHILS - ABS 0.11 K/uL    0.04-0.54                 



             (CELLAVISION)(BEAKER) (test code                                   

     



             = 2834)                                             

 

             BASOPHILS - ABS 0.21 K/uL    0.01-0.08    H            



             (CELLAVISION)(BEAKER) (test code                                   

     



             = 2835)                                             

 

             ATYPICAL LYMPHOCYTES - ABS 0.53 K/uL    0.00-0.00    H            



             (CELLAVISION)(BEAKER) (test code                                   

     



             = 2858)                                             

 

             TOTAL COUNTED (BEAKER) (test code 100                              

      



             = 1351)                                             

 

             PLT MORPHOLOGY (BEAKER) (test Normal                               

  



             code = 486)                                         

 

             SMUDGE CELLS (BEAKER) (test code Present                           

     



             = 1371)                                             

 

             POLYCHROMATOPHILLIC RBCS(BEAKER) 3+ many                           

     



             (test code = 478)                                        

 

             ANISOCYTOSIS (BEAKER) (test code 1+ few                            

     



             = 961)                                              

 

             MICROCYTES (BEAKER) (test code = 1+ few                            

     



             965)                                                

 

             POIKILOCYTES (BEAKER) (test code 2+ moderate                       

     



             = 966)                                              

 

             PLATELET CONCENTRATION Adequate                               



             (CELLAVISION)(BEAKER) (test code                                   

     



             = 3438)                                             



Received comment: User comments: Slide comments:BASIC METABOLIC [id number] 
13:49:00





             Test Item    Value        Reference Range Interpretation Comments

 

             SODIUM (BEAKER) 138 meq/L    136-145                   



             (test code = 381)                                        

 

             POTASSIUM (BEAKER) 4.8 meq/L    3.5-5.1                   



             (test code = 379)                                        

 

             CHLORIDE (BEAKER) 104 meq/L                        



             (test code = 382)                                        

 

             CO2 (BEAKER) (test 27 meq/L     22-29                     



             code = 355)                                         

 

             BLOOD UREA NITROGEN 36 mg/dL     7-21         H            



             (BEAKER) (test code                                        



             = 354)                                              

 

             CREATININE (BEAKER) 1.96 mg/dL   0.57-1.25    H            



             (test code = 358)                                        

 

             GLUCOSE RANDOM 78 mg/dL                         



             (BEAKER) (test code                                        



             = 652)                                              

 

             CALCIUM (BEAKER) 9.9 mg/dL    8.4-10.2                  



             (test code = 697)                                        

 

             EGFR (BEAKER) (test 40 mL/min/1.73                           ESTIMA

YESSICA GFR IS



             code = 1092) sq m                                   NOT AS ACCURATE

 AS



                                                                 CREATININE



                                                                 CLEARANCE IN



                                                                 PREDICTING



                                                                 GLOMERULAR



                                                                 FILTRATION RATE

.



                                                                 ESTIMATED GFR I

S



                                                                 NOT APPLICABLE 

FOR



                                                                 DIALYSIS PATIEN

TS.



Specimen slightly [id number] 13:46:00





             Test Item    Value        Reference Range Interpretation Comments

 

             PARTIAL THROMBOPLASTIN TIME 27.1 seconds 22.5-36.0                 



             (BEAKER) (test code = 760)                                        



PROTHROMBIN [id number] 13:45:00





             Test Item    Value        Reference Range Interpretation Comments

 

             PROTIME (BEAKER) (test code = 13.4 seconds 11.9-14.2               

  



             759)                                                

 

             INR (BEAKER) (test code = 370) 1.1          <=5.9                  

   



Effective 2019: PT Reference Range ChangeNew: 11.9-14.2  Previous: 11.7-
14.7RECOMMENDED COUMADIN/WARFARIN INR THERAPY RANGESSTANDARD DOSE: 2.0-3.0  
Includes: PROPHYLAXIS for venous thrombosis, systemic embolization; TREATMENT 
for venous thrombosis and/or pulmonary embolus.HIGH RISK: Target INR is2.5-3.5 
for patients wiht mechanical heart valves.

## 2021-02-26 NOTE — P.PN
Date of Service: 02/26/21


Vital Signs











Temp Pulse Resp BP Pulse Ox


 


 96.1 F L  61   24 H  168/76 H  93 


 


 02/26/21 13:06  02/26/21 13:06  02/26/21 15:05  02/26/21 13:06  02/26/21 15:05





Microbiology Results





01/20/21 18:00   Blood  - Blood   Aerobic Blood Culture - Final


                            No growth in 5 days.


01/20/21 18:00   Blood  - Blood   Anaerobic Blood Culture - Final


                            No growth in 5 days.


01/20/21 17:00   Blood  - Blood   Aerobic Blood Culture - Final


                            No growth in 5 days.


01/20/21 17:00   Blood  - Blood   Anaerobic Blood Culture - Final


                            No growth in 5 days.


01/20/21 18:30   Clean Catch Urine   [place] Count - Final


                            No growth.


01/20/21 18:30   Clean Catch Urine    - Final


                            No growth.





Assessment/ Plan: 


Nephrology





Worsening dyspnea with hypoxia.


Weakness and fatigue


No acute events overnight.





Vitals, medications, blood work and imaging reviewed in the chart.


NAD.  MMM.  Neck supple.  CTA.  RRR.  Soft Abd.  No C/C/E.  No rash.  Awake.  

Normal Speech. 





A/P:  Continue the current POC and Medications other than the changes listed.  

AM Labs PRN.  Recommend daily weight.  Please see the orders for complete 

details.





YANET


CKD 3A with proteinuria


-No NSAIDs





Hyponatremia





Hypocalcemia


-Continue Vitamin D


-Continue Calcitriol





HTN with CKD/ CHF


-Continue Amlodipine and Coreg





Diastolic CHF, chronic


-Continue Coreg





DM II with CKD


-Wean steroids as tolerated





Moderate malnutrition


-Encourage nutrition


-Recommend protein supplementation





Anemia in chronic illness


GI bleed


-Retacrit PRN





BPH with LUTS


-Continue Flomax





Acute hypoxic respiratory failure due to COVID-19


-Continue steroid therapy


-Wean Oxygen as tolerated.





Poor prognosis due to failure to improve.

## 2021-02-27 RX ADMIN — MORPHINE SULFATE SCH MG: 2 INJECTION, SOLUTION INTRAMUSCULAR; INTRAVENOUS at 01:09

## 2021-02-27 RX ADMIN — MORPHINE SULFATE SCH MG: 2 INJECTION, SOLUTION INTRAMUSCULAR; INTRAVENOUS at 13:20

## 2021-02-27 RX ADMIN — MORPHINE SULFATE SCH MG: 2 INJECTION, SOLUTION INTRAMUSCULAR; INTRAVENOUS at 05:08

## 2021-02-27 RX ADMIN — MORPHINE SULFATE SCH MG: 2 INJECTION, SOLUTION INTRAMUSCULAR; INTRAVENOUS at 08:27

## 2021-02-27 RX ADMIN — MORPHINE SULFATE SCH: 2 INJECTION, SOLUTION INTRAMUSCULAR; INTRAVENOUS at 13:00

## 2021-02-28 RX ADMIN — MORPHINE SULFATE PRN MG: 2 INJECTION, SOLUTION INTRAMUSCULAR; INTRAVENOUS at 00:33

## 2021-02-28 RX ADMIN — MORPHINE SULFATE PRN MG: 2 INJECTION, SOLUTION INTRAMUSCULAR; INTRAVENOUS at 08:10

## 2021-02-28 RX ADMIN — MORPHINE SULFATE PRN MG: 2 INJECTION, SOLUTION INTRAMUSCULAR; INTRAVENOUS at 22:05

## 2021-02-28 RX ADMIN — MORPHINE SULFATE PRN MG: 2 INJECTION, SOLUTION INTRAMUSCULAR; INTRAVENOUS at 15:48

## 2021-02-28 NOTE — P.PN
Subjective


Date of Service: 02/28/21


Subjective: Other ( family at bedside.  Patient is asleep and not responding.)





Review of Systems


is unable to be obtained





Physical Examination





- Vital Signs


Temperature: 96.3 F


Blood Pressure: 131/71


Pulse: 91


Respirations: 24


Pulse Ox (%): 97





- Physical Exam


General: Unresponsive





Assessment & Plan





- Problems (Diagnosis)


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


Current Visit: No   Status: Acute   





(2) Pneumonia due to COVID-19 virus


Current Visit: No   Status: Acute   





(3) Type 2 diabetes mellitus


Current Visit: No   Status: Acute   





- Plan





Supportive care and comfort measures at this time per family's request.  Patient

is on non-rebreather and nasal cannula at this time.  Keep saturations stable as

tolerated.





- Advance Directives


Does patient have a Living Will: No


Does patient have a Durable POA for Healthcare: No





- Code Status/Comfort Care


Code Status: Do Not Attempt Resuscitat


Comfort Measures: Hospice Care


Critical Care: No


Time Spent Managing PTS Care (In Minutes): 30

## 2021-02-28 NOTE — P.DS
Discharge Date: 02/26/21


Primary Care Provider: Dr. Rodrigues


Disposition: HOSPICE-MEDICAL FACILITY


Reason for Admission: Respiratory failure from corona virus





- Problems


(1) Pneumonia due to COVID-19 virus


Status: Acute   





(2) YANET (acute kidney injury)


Status: Acute   





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


Status: Acute   





(4) UTI (urinary tract infection)


Onset Date: 09/18/17   Status: Acute   





(5) Weakness generalized


Onset Date: 09/18/17   Status: Acute   





(6) Hypertension


Status: Acute   





(7) Type 2 diabetes mellitus


Status: Acute   





(8) CKD (chronic kidney disease)


Status: Acute   





(9) Leukocytosis


Onset Date: 09/18/17   Status: Acute   


Brief History of Present Illness: 





88yo M, PMH:  HTN, DM 2, GERD, dementia was brought into the ED due to altered 

mental status and weakness.  Family reports the patient has not been acting 

himself lately, was noted to be hypotensive at home 90s/60s.  They report he has

been complaining of a headache behind his right eye intermittently over the past

few days.  Family were concerned so they brought the patient in.  Workup in ED 

notable for elevated creatinine at 2.27, elevated pro calcitonin 1.29, UA 

suggestive of urinary tract infection, COVID +.  No leukocytosis 


Patient is alert and oriented himself only, unable to provide any history.  

History obtained from patient's daughter.  Daughter reports that the patient has

some dementia but mostly has a " sharp mind".  Spoke with patient's PCP, reports

patient's cognitive function and it has been slowly declining over the past year

or so as well.


Hospital Course: 





Patient has not improved.  His chest x-ray shows diffuse inflammation throughout

both lung fields.  Patient's prognosis is poor.  I spoke to his daughter who is 

his medical power of  and she spoke to her siblings and I was able to 

communicate with him on pace time and decision was made to proceed with hospice 

care.


Vital Signs/Physical Exam: 














Temp Pulse Resp BP Pulse Ox


 


 96.1 F L  61   24 H  168/76 H  93 


 


 02/26/21 13:06  02/26/21 13:06  02/26/21 15:05  02/26/21 13:06  02/26/21 15:05








General: Confused


Laboratory Data at Discharge: 














WBC  5.70 K/uL (4.3-10.9)   02/26/21  05:55    


 


Hgb  10.9 g/dL (13.6-17.9)  L  02/26/21  05:55    


 


Hct  32.8 % (39.6-49.0)  L  02/26/21  05:55    


 


Plt Count  82 K/uL (152-406)  L  02/26/21  05:55    


 


PT  12.0 SECONDS (9.5-12.5)   02/22/21  03:42    


 


INR  1.04   02/22/21  03:42    


 


APTT  20.0 SECONDS (24.3-36.9)  L  02/22/21  03:42    


 


Sodium  136 mmol/L (136-145)   02/26/21  05:55    


 


Potassium  4.7 mmol/L (3.5-5.1)   02/26/21  05:55    


 


BUN  31 mg/dL (7-18)  H  02/26/21  05:55    


 


Creatinine  0.79 mg/dL (0.55-1.3)   02/26/21  05:55    


 


Glucose  82 mg/dL ()   02/26/21  05:55    


 


Phosphorus  3.0 mg/dL (2.5-4.9)   02/25/21  10:50    


 


Magnesium  1.9 mg/dL (1.8-2.4)   02/26/21  05:55    


 


Total Bilirubin  0.9 mg/dL (0.2-1.0)   02/22/21  03:42    


 


AST  37 U/L (15-37)   02/22/21  03:42    


 


ALT  202 U/L (12-78)  H  02/22/21  03:42    


 


Alkaline Phosphatase  68 U/L ()   02/22/21  03:42    








Home Medications: 








Amlodipine [Norvasc] 10 mg PO DAILY 01/21/21 


Carvedilol [Coreg] 12.5 mg PO DAILY 01/21/21 


Chlorthalidone [Hygroton 25mg Tab] 25 mg PO DAILY 01/21/21 


Clopidogrel Bisulfate [Plavix] 75 mg PO DAILY 01/21/21 


Glipizide [Glipizide ER] 5 mg PO BID 01/21/21 


Quetiapine Fumarate [Seroquel] 25 mg PO BID 01/21/21 


RX: Lovastatin 20 mg PO DAILY 01/21/21 


Tamsulosin [Flomax] 0.4 mg PO BEDTIME 01/21/21 





Physician Discharge Instructions: 


  Proceed to hospice care


Followup: 


NONE,NONE [Primary Care Provider] - 


Time spent managing pt's care (in minutes): 35

## 2021-02-28 NOTE — P.HP
Certification for Inpatient


Patient admitted to: Inpatient


With expected LOS: >2 Midnights


Patient will require the following post-hospital care: Hospice


Practitioner: I am a practitioner with admitting privileges, knowledge of 

patient current condition, hospital course, and medical plan of care.


Services: Services provided to patient in accordance with Admission requirements

found in Title 42 Section 412.3 of the Code of Federal Regulations





Patient History


Date of Service: 02/27/21


Reason for admission:  inpatient hospice for acute respiratory failure


History of Present Illness: 





 patient is an 87-year-old gentleman who has declined after being diagnosed with

COVID-19  pneumonia.  Patient has diffuse inflammation and has been getting more

hypoxic.  Since there was no improvement over the course of the last month 

family has decided to proceed with inpatient hospice care.  Family is wanting  

medication only on an as-needed basis.  They were worried that we will over 

sedate patient even though he is not in pain.  I did tell her that it is 

unlikely for us to do this but I will make sure the nurses are aware and all 

pain and sedatives will be p.r.n..


Allergies





No Known Allergies Allergy (Verified 01/20/21 22:36)


   





Home Medications: 








Amlodipine [Norvasc] 10 mg PO DAILY 01/21/21 


Carvedilol [Coreg] 12.5 mg PO DAILY 01/21/21 


Chlorthalidone [Hygroton 25mg Tab] 25 mg PO DAILY 01/21/21 


Clopidogrel Bisulfate [Plavix] 75 mg PO DAILY 01/21/21 


Glipizide [Glipizide ER] 5 mg PO BID 01/21/21 


Lovastatin 20 mg PO DAILY 01/21/21 


Quetiapine Fumarate [Seroquel] 25 mg PO BID 01/21/21 


Tamsulosin [Flomax] 0.4 mg PO BEDTIME 01/21/21 








- Past Medical/Surgical History


Diabetic: Yes


-: HTN


-: DM Type 2


-: GERD


-: HYPERLIPIDEMIA


-: BPH


-: KIDNEY PROBLEMS


-: dementia


-: KNEE JARRETT


-: APPENDECTOMY





- Family History


  ** Brother


Medical History: Heart disease, Hypertension





  ** Sister


Medical History: Heart disease, Hypertension





- Social History


Smoking Status: Unknown if ever smoked


Alcohol use: No


CD- Drugs: No


Caffeine use: No





Review of Systems


is unable to be obtained





Physical Examination





- Vital Signs


Temperature: 96.3 F


Blood Pressure: 131/71


Pulse: 91


Respirations: 24


Pulse Ox (%): 97





- Physical Exam


General: Unresponsive


HEENT: Atraumatic


Neck: Supple


Respiratory: Expiratory wheezes, Rhonchi/gurgles


Cardiovascular: Regular rate/rhythm, Normal S1 S2


Gastrointestinal: Normal bowel sounds, Soft and benign, Non-distended, W/out 

succussion splash


Musculoskeletal: No clubbing





Assessment & Plan





- Problems (Diagnosis)


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


Current Visit: No   Status: Acute   





(2) Pneumonia due to COVID-19 virus


Current Visit: No   Status: Acute   





(3) Type 2 diabetes mellitus


Current Visit: No   Status: Acute   





- Plan





  Supportive care and comfort measures at this time per family's request.  

Patient is currently on BiPAP but we will put him on non-rebreather and nasal 

cannula at this time.  Keep saturations stable as tolerated


Discharge Plan: Other (hospice)





- Advance Directives


Does patient have a Living Will: No


Does patient have a Durable POA for Healthcare: No





- Code Status/Comfort Care


Code Status Assessed: Yes


Code Status: Do Not Attempt Resuscitat


Comfort Measures: Hospice Care


Critical Care: No


Time Spent Managing PTS Care (In Minutes): 35

## 2021-03-01 RX ADMIN — MORPHINE SULFATE PRN MG: 2 INJECTION, SOLUTION INTRAMUSCULAR; INTRAVENOUS at 20:27

## 2021-03-01 RX ADMIN — MORPHINE SULFATE PRN MG: 2 INJECTION, SOLUTION INTRAMUSCULAR; INTRAVENOUS at 17:30

## 2021-03-01 RX ADMIN — MORPHINE SULFATE PRN MG: 2 INJECTION, SOLUTION INTRAMUSCULAR; INTRAVENOUS at 10:29

## 2021-03-01 RX ADMIN — MORPHINE SULFATE PRN MG: 2 INJECTION, SOLUTION INTRAMUSCULAR; INTRAVENOUS at 02:58

## 2021-03-01 RX ADMIN — MORPHINE SULFATE PRN MG: 2 INJECTION, SOLUTION INTRAMUSCULAR; INTRAVENOUS at 04:49

## 2021-03-02 RX ADMIN — MORPHINE SULFATE PRN MG: 2 INJECTION, SOLUTION INTRAMUSCULAR; INTRAVENOUS at 11:25

## 2021-03-02 RX ADMIN — MORPHINE SULFATE PRN MG: 2 INJECTION, SOLUTION INTRAMUSCULAR; INTRAVENOUS at 02:41

## 2021-03-02 RX ADMIN — MORPHINE SULFATE PRN MG: 2 INJECTION, SOLUTION INTRAMUSCULAR; INTRAVENOUS at 05:25

## 2021-03-02 RX ADMIN — MORPHINE SULFATE PRN MG: 2 INJECTION, SOLUTION INTRAMUSCULAR; INTRAVENOUS at 17:51

## 2021-03-02 RX ADMIN — MORPHINE SULFATE PRN MG: 2 INJECTION, SOLUTION INTRAMUSCULAR; INTRAVENOUS at 20:22

## 2021-03-02 RX ADMIN — MORPHINE SULFATE PRN MG: 2 INJECTION, SOLUTION INTRAMUSCULAR; INTRAVENOUS at 08:07

## 2021-03-02 RX ADMIN — MORPHINE SULFATE PRN MG: 2 INJECTION, SOLUTION INTRAMUSCULAR; INTRAVENOUS at 15:40

## 2021-03-03 VITALS — DIASTOLIC BLOOD PRESSURE: 58 MMHG | TEMPERATURE: 97.6 F | SYSTOLIC BLOOD PRESSURE: 113 MMHG

## 2021-03-03 VITALS — OXYGEN SATURATION: 82 %

## 2021-03-03 RX ADMIN — MORPHINE SULFATE PRN MG: 2 INJECTION, SOLUTION INTRAMUSCULAR; INTRAVENOUS at 04:07

## 2021-03-03 RX ADMIN — MORPHINE SULFATE PRN MG: 2 INJECTION, SOLUTION INTRAMUSCULAR; INTRAVENOUS at 09:42

## 2023-12-19 NOTE — P.PN
Problem: Chronic Conditions and Co-morbidities  Goal: Patient's chronic conditions and co-morbidity symptoms are monitored and maintained or improved  Outcome: Adequate for Discharge  Flowsheets (Taken 12/19/2023 1111)  Care Plan - Patient's Chronic Conditions and Co-Morbidity Symptoms are Monitored and Maintained or Improved:   Monitor and assess patient's chronic conditions and comorbid symptoms for stability, deterioration, or improvement   Collaborate with multidisciplinary team to address chronic and comorbid conditions and prevent exacerbation or deterioration  Note: Patient verbalizes understanding to verbal information given on lupron injection,action and possible side effects. Aware to call MD if develop complications. Problem: Discharge Planning  Goal: Discharge to home or other facility with appropriate resources  Outcome: Adequate for Discharge  Flowsheets (Taken 12/19/2023 1111)  Discharge to home or other facility with appropriate resources:   Identify barriers to discharge with patient and caregiver   Identify discharge learning needs (meds, wound care, etc)  Note: Verbalize understanding of discharge instructions, follow up appointments, and when to call Physician. Discuss understanding of discharge instructions, follow up appointments and when to call Physician. Problem: Safety - Adult  Goal: Free from fall injury  Outcome: Adequate for Discharge  Flowsheets (Taken 12/19/2023 1111)  Free From Fall Injury:   Instruct family/caregiver on patient safety   Based on caregiver fall risk screen, instruct family/caregiver to ask for assistance with transferring infant if caregiver noted to have fall risk factors  Note: Free from falls while in [facility] Oncology. Discussed the need to use the call light for assistance when getting up to ambulate. Care plan reviewed with patient. Patient verbalizes understanding of the plan of care and contribute to goal setting. Subjective


Date of Service: 02/16/21


Chief Complaint: Respiratory failure from corona virus


Subjective: Other (Overall stable.  No change from yesterday.)





Physical Examination





- Vital Signs


Temperature: 96.7 F


Blood Pressure: 145/69


Pulse: 74


Respirations: 24


Pulse Ox (%): 96





- Studies


Medications List Reviewed: Yes





Assessment & Plan


Discharge Plan: Other (Skilled nursing facility)


Plan to discharge in: Greater than 2 days


Physician Review Additional Text: 





Initial chief complaint:


Shortness of breath secondary to COVID 19





Physical Exam: 


Patient is alert. Dementia noted. 


Heart: normal rythym


Lung: he is on nonrebreather.


GI: soft, nontender, nondistended


EXT: good range of motion. 





Impression:


Acute respiratory failure with hypoxia secondary to bilateral COVID 19 pneumonia


Alzheimer dementia


HTN


DM Type 2 


BPH


Hyperlipidemia





Plan: 


Acute respiratory failure with hypoxia secondary to bilateral COVID 19 

pneumonia:  Patient remains stable on non-rebreather.  Slow progress noted. No 

significant change over the last week. Continue with IV steroids and 

supplementation.  Continue to reassess and monitor.  spoke with family about 

skilled placement.  They have agreed.  Will talk to  to help 

arrange.


Alzheimer dementia:  Overall stable.  Continue with mood stabilizers.


HTN:  Continue medication


DM Type 2:  Continue Accu-Cheks.  Continue to adjust medication.  Maintain 

adequate control of blood sugar.


BPH:  Continue medication


Hyperlipidemia: continue medication


Time Spent Managing Pts Care (In Minutes): 55